# Patient Record
Sex: FEMALE | Race: BLACK OR AFRICAN AMERICAN | Employment: FULL TIME | URBAN - METROPOLITAN AREA
[De-identification: names, ages, dates, MRNs, and addresses within clinical notes are randomized per-mention and may not be internally consistent; named-entity substitution may affect disease eponyms.]

---

## 2017-02-17 ENCOUNTER — OFFICE VISIT (OUTPATIENT)
Dept: INTERNAL MEDICINE CLINIC | Facility: CLINIC | Age: 52
End: 2017-02-17

## 2017-02-17 VITALS
WEIGHT: 293 LBS | HEIGHT: 67 IN | HEART RATE: 64 BPM | BODY MASS INDEX: 45.99 KG/M2 | DIASTOLIC BLOOD PRESSURE: 72 MMHG | TEMPERATURE: 98 F | SYSTOLIC BLOOD PRESSURE: 128 MMHG | OXYGEN SATURATION: 97 %

## 2017-02-17 DIAGNOSIS — E78.5 HYPERLIPIDEMIA, UNSPECIFIED HYPERLIPIDEMIA TYPE: ICD-10-CM

## 2017-02-17 DIAGNOSIS — Z00.00 ANNUAL PHYSICAL EXAM: Primary | ICD-10-CM

## 2017-02-17 DIAGNOSIS — R73.03 PREDIABETES: ICD-10-CM

## 2017-02-17 DIAGNOSIS — E55.9 VITAMIN D DEFICIENCY: ICD-10-CM

## 2017-02-17 DIAGNOSIS — N95.1 PERIMENOPAUSAL: ICD-10-CM

## 2017-02-17 DIAGNOSIS — E66.01 MORBID OBESITY, UNSPECIFIED OBESITY TYPE (HCC): ICD-10-CM

## 2017-02-17 DIAGNOSIS — I10 ESSENTIAL HYPERTENSION: ICD-10-CM

## 2017-02-17 PROCEDURE — 99386 PREV VISIT NEW AGE 40-64: CPT | Performed by: INTERNAL MEDICINE

## 2017-02-17 RX ORDER — VALACYCLOVIR HYDROCHLORIDE 1 G/1
1 TABLET, FILM COATED ORAL DAILY
Qty: 30 TABLET | Refills: 5 | Status: SHIPPED | OUTPATIENT
Start: 2017-02-17 | End: 2017-03-19

## 2017-02-17 NOTE — PATIENT INSTRUCTIONS
Facts about weight loss:  -Men lose more weight than women of similar height and weight when they comply with eating any given diet because men have more lean body mass, less percent body fat, and therefore higher energy expenditure.  -Older subjects of Constipation means that you have bowel movements that are less frequent than usual. Stools often become very hard and difficult to pass. Constipation is very common. At some point in life it affects almost everyone.  Since everyone's bowel habits are diffe All treatment should be done after talking with your healthcare provider. This is especially true if you have another medical problems, are taking prescription medicines, or are an older adult. Treatment most often involves lifestyle changes.  You may also · Pain in your abdomen or back gets worse  · Nausea or vomiting  · Swelling in your abdomen  · Blood in the stool  · Black, tarry stool  · Involuntary weight loss  · Weakness  Date Last Reviewed: 12/30/2015  © 7397-5729 The [place] · Cut back on saturated fats and trans (also called hydrogenated) fats by selecting lean cuts of meat, low-fat dairy, and using oils instead of solid fats. Limit baked goods, processed meats, and fried foods.  A diet that’s high in these fats increases your © 7026-9332 The [address] [place] [address], [address] [place]. All rights reserved. This information is not intended as a substitute for professional medical care. Always follow your healthcare professional's instructions.         Raffi Loyola Your healthcare provider may recommend that you get more physical activity if you haven't been active. Your provider may recommend that you get moderate to vigorous physical activity for at least 40 minutes each day.  You should do this for at least 3 to 4 Healthy eating and exercise are a good start to keeping your cholesterol down. But you may need some extra help from medicine. If your doctor prescribes medicine, be sure to take it exactly as directed.  Remember:  · Tell your healthcare provider about all If you are in a high-risk group, talk with your healthcare provider about your treatment goals. Make sure you understand why these goals are important, based on your own health history and your family history of heart disease or high cholesterol.   Make a p

## 2017-02-18 NOTE — PROGRESS NOTES
Joseph Garcia is a 46year old female.     Chief complaint:  Annual physical exam   HPI:         46year old female with PMH as listed below here for annual physical exam   Patient with hx of HTN on triamterene  No headache no chest pain no sob     Hyperlipi edema  NEURO: no gross deficits             ValACYclovir HCl 1 G Oral Tab  No orders of the defined types were placed in this encounter. ASSESSMENT AND PLAN:     1. Essential hypertension  Continue triamterene  cmp        2.  Hyperlipidemia, unspecifi

## 2017-02-20 ENCOUNTER — NURSE ONLY (OUTPATIENT)
Dept: INTERNAL MEDICINE CLINIC | Facility: CLINIC | Age: 52
End: 2017-02-20

## 2017-02-20 DIAGNOSIS — E55.9 VITAMIN D DEFICIENCY: ICD-10-CM

## 2017-02-20 DIAGNOSIS — I10 ESSENTIAL HYPERTENSION: ICD-10-CM

## 2017-02-20 DIAGNOSIS — N95.1 PERIMENOPAUSAL: ICD-10-CM

## 2017-02-20 DIAGNOSIS — R73.03 PREDIABETES: ICD-10-CM

## 2017-02-20 DIAGNOSIS — E66.01 MORBID OBESITY, UNSPECIFIED OBESITY TYPE (HCC): ICD-10-CM

## 2017-02-20 DIAGNOSIS — E78.5 HYPERLIPIDEMIA, UNSPECIFIED HYPERLIPIDEMIA TYPE: ICD-10-CM

## 2017-02-20 DIAGNOSIS — Z00.00 ANNUAL PHYSICAL EXAM: ICD-10-CM

## 2017-02-20 LAB
ALBUMIN SERPL BCP-MCNC: 3.6 G/DL (ref 3.5–4.8)
ALBUMIN/GLOB SERPL: 1 {RATIO} (ref 1–2)
ALP SERPL-CCNC: 83 U/L (ref 32–100)
ALT SERPL-CCNC: 9 U/L (ref 14–54)
ANION GAP SERPL CALC-SCNC: 8 MMOL/L (ref 0–18)
AST SERPL-CCNC: 16 U/L (ref 15–41)
BASOPHILS # BLD: 0.1 K/UL (ref 0–0.2)
BASOPHILS NFR BLD: 1 %
BILIRUB SERPL-MCNC: 0.4 MG/DL (ref 0.3–1.2)
BUN SERPL-MCNC: 14 MG/DL (ref 8–20)
BUN/CREAT SERPL: 16.3 (ref 10–20)
CALCIUM SERPL-MCNC: 9.2 MG/DL (ref 8.5–10.5)
CHLORIDE SERPL-SCNC: 103 MMOL/L (ref 95–110)
CHOLEST SERPL-MCNC: 232 MG/DL (ref 110–200)
CO2 SERPL-SCNC: 30 MMOL/L (ref 22–32)
CREAT SERPL-MCNC: 0.86 MG/DL (ref 0.5–1.5)
EOSINOPHIL # BLD: 0.2 K/UL (ref 0–0.7)
EOSINOPHIL NFR BLD: 4 %
ERYTHROCYTE [DISTWIDTH] IN BLOOD BY AUTOMATED COUNT: 13.1 % (ref 11–15)
FSH SERPL-ACNC: 9.3 MIU/ML
GLOBULIN PLAS-MCNC: 3.6 G/DL (ref 2.5–3.7)
GLUCOSE SERPL-MCNC: 99 MG/DL (ref 70–99)
HBA1C MFR BLD: 5.6 % (ref 4–6)
HCT VFR BLD AUTO: 36.3 % (ref 35–48)
HDLC SERPL-MCNC: 44 MG/DL
HGB BLD-MCNC: 12.1 G/DL (ref 12–16)
LDLC SERPL CALC-MCNC: 169 MG/DL (ref 0–99)
LYMPHOCYTES # BLD: 1.3 K/UL (ref 1–4)
LYMPHOCYTES NFR BLD: 24 %
MCH RBC QN AUTO: 30.8 PG (ref 27–32)
MCHC RBC AUTO-ENTMCNC: 33.3 G/DL (ref 32–37)
MCV RBC AUTO: 92.6 FL (ref 80–100)
MONOCYTES # BLD: 0.6 K/UL (ref 0–1)
MONOCYTES NFR BLD: 11 %
NEUTROPHILS # BLD AUTO: 3.2 K/UL (ref 1.8–7.7)
NEUTROPHILS NFR BLD: 60 %
NONHDLC SERPL-MCNC: 188 MG/DL
OSMOLALITY UR CALC.SUM OF ELEC: 293 MOSM/KG (ref 275–295)
PLATELET # BLD AUTO: 318 K/UL (ref 140–400)
PMV BLD AUTO: 9 FL (ref 7.4–10.3)
POTASSIUM SERPL-SCNC: 3.8 MMOL/L (ref 3.3–5.1)
PROT SERPL-MCNC: 7.2 G/DL (ref 5.9–8.4)
RBC # BLD AUTO: 3.92 M/UL (ref 3.7–5.4)
SODIUM SERPL-SCNC: 141 MMOL/L (ref 136–144)
TRIGL SERPL-MCNC: 97 MG/DL (ref 1–149)
TSH SERPL-ACNC: 1.18 UIU/ML (ref 0.34–5.6)
VIT B12 SERPL-MCNC: 412 PG/ML (ref 181–914)
WBC # BLD AUTO: 5.4 K/UL (ref 4–11)

## 2017-02-20 PROCEDURE — 82607 VITAMIN B-12: CPT | Performed by: INTERNAL MEDICINE

## 2017-02-20 PROCEDURE — 80061 LIPID PANEL: CPT | Performed by: INTERNAL MEDICINE

## 2017-02-20 PROCEDURE — 84443 ASSAY THYROID STIM HORMONE: CPT | Performed by: INTERNAL MEDICINE

## 2017-02-20 PROCEDURE — 82306 VITAMIN D 25 HYDROXY: CPT | Performed by: INTERNAL MEDICINE

## 2017-02-20 PROCEDURE — 83001 ASSAY OF GONADOTROPIN (FSH): CPT | Performed by: INTERNAL MEDICINE

## 2017-02-20 PROCEDURE — 85025 COMPLETE CBC W/AUTO DIFF WBC: CPT | Performed by: INTERNAL MEDICINE

## 2017-02-20 PROCEDURE — 80053 COMPREHEN METABOLIC PANEL: CPT | Performed by: INTERNAL MEDICINE

## 2017-02-20 PROCEDURE — 36415 COLL VENOUS BLD VENIPUNCTURE: CPT | Performed by: INTERNAL MEDICINE

## 2017-02-20 PROCEDURE — 83036 HEMOGLOBIN GLYCOSYLATED A1C: CPT | Performed by: INTERNAL MEDICINE

## 2017-02-20 PROCEDURE — 80050 GENERAL HEALTH PANEL: CPT | Performed by: INTERNAL MEDICINE

## 2017-02-20 NOTE — PROGRESS NOTES
CBC,CMP,LIPID,TSH,HEMOGLOBIN A1C,VITAMIN D, VITAMIN B12, and [address] [place] [address] labs drawn per Dr Analia Gómez orders.  Patient tolerated lab draw well

## 2017-02-22 LAB — 25(OH)D3 SERPL-MCNC: 25.5 NG/ML

## 2017-03-07 RX ORDER — TRIAMTERENE AND HYDROCHLOROTHIAZIDE 37.5; 25 MG/1; MG/1
TABLET ORAL
Qty: 30 TABLET | Refills: 3 | Status: SHIPPED | OUTPATIENT
Start: 2017-03-07 | End: 2017-06-09

## 2017-03-07 RX ORDER — TRIAMTERENE AND HYDROCHLOROTHIAZIDE 37.5; 25 MG/1; MG/1
TABLET ORAL
Refills: 1 | COMMUNITY
Start: 2017-01-31 | End: 2017-03-07

## 2017-03-14 ENCOUNTER — MED REC SCAN ONLY (OUTPATIENT)
Dept: INTERNAL MEDICINE CLINIC | Facility: CLINIC | Age: 52
End: 2017-03-14

## 2017-04-20 RX ORDER — DICLOFENAC SODIUM 75 MG/1
75 TABLET, DELAYED RELEASE ORAL 2 TIMES DAILY
Qty: 30 TABLET | Refills: 2 | Status: SHIPPED | OUTPATIENT
Start: 2017-04-20 | End: 2017-09-08 | Stop reason: ALTCHOICE

## 2017-04-20 RX ORDER — VALACYCLOVIR HYDROCHLORIDE 500 MG/1
TABLET, FILM COATED ORAL
Refills: 0 | COMMUNITY
Start: 2017-01-31 | End: 2017-11-14

## 2017-04-20 NOTE — TELEPHONE ENCOUNTER
Pt's  verified.  Pt requesting a refill on diclofenac 75 mg - states she forgot to inform MD and MA who roomed her that she takes this med- also uses an eczema cream will call back with name of it for her list to be updated- confirmed pharmacy informed h

## 2017-04-21 ENCOUNTER — TELEPHONE (OUTPATIENT)
Dept: INTERNAL MEDICINE CLINIC | Facility: CLINIC | Age: 52
End: 2017-04-21

## 2017-06-09 RX ORDER — TRIAMTERENE AND HYDROCHLOROTHIAZIDE 37.5; 25 MG/1; MG/1
TABLET ORAL
Qty: 30 TABLET | Refills: 2 | Status: SHIPPED | OUTPATIENT
Start: 2017-06-09 | End: 2017-09-17

## 2017-07-28 ENCOUNTER — TELEPHONE (OUTPATIENT)
Dept: INTERNAL MEDICINE CLINIC | Facility: CLINIC | Age: 52
End: 2017-07-28

## 2017-09-08 ENCOUNTER — OFFICE VISIT (OUTPATIENT)
Dept: INTERNAL MEDICINE CLINIC | Facility: CLINIC | Age: 52
End: 2017-09-08

## 2017-09-08 VITALS
HEART RATE: 63 BPM | OXYGEN SATURATION: 99 % | TEMPERATURE: 98 F | WEIGHT: 293 LBS | HEIGHT: 67 IN | BODY MASS INDEX: 45.99 KG/M2

## 2017-09-08 DIAGNOSIS — Z00.00 ROUTINE CHECK-UP: ICD-10-CM

## 2017-09-08 DIAGNOSIS — N95.1 PERI-MENOPAUSAL: ICD-10-CM

## 2017-09-08 DIAGNOSIS — I10 ESSENTIAL HYPERTENSION: ICD-10-CM

## 2017-09-08 DIAGNOSIS — Z12.39 SCREENING FOR BREAST CANCER: ICD-10-CM

## 2017-09-08 DIAGNOSIS — E78.5 HYPERLIPIDEMIA, UNSPECIFIED HYPERLIPIDEMIA TYPE: ICD-10-CM

## 2017-09-08 DIAGNOSIS — J32.9 SINUSITIS, UNSPECIFIED CHRONICITY, UNSPECIFIED LOCATION: ICD-10-CM

## 2017-09-08 DIAGNOSIS — Z00.00 ROUTINE HEALTH MAINTENANCE: ICD-10-CM

## 2017-09-08 DIAGNOSIS — R10.9 ABDOMINAL PAIN, UNSPECIFIED ABDOMINAL LOCATION: ICD-10-CM

## 2017-09-08 DIAGNOSIS — K21.9 GASTROESOPHAGEAL REFLUX DISEASE, ESOPHAGITIS PRESENCE NOT SPECIFIED: ICD-10-CM

## 2017-09-08 DIAGNOSIS — Z12.72 ENCOUNTER FOR SCREENING FOR MALIGNANT NEOPLASM OF VAGINA: Primary | ICD-10-CM

## 2017-09-08 DIAGNOSIS — Z12.4 SCREENING FOR CERVICAL CANCER: ICD-10-CM

## 2017-09-08 LAB
ANION GAP SERPL CALC-SCNC: 10 MMOL/L (ref 0–18)
BUN SERPL-MCNC: 12 MG/DL (ref 8–20)
BUN/CREAT SERPL: 13.8 (ref 10–20)
CALCIUM SERPL-MCNC: 9.4 MG/DL (ref 8.5–10.5)
CHLORIDE SERPL-SCNC: 101 MMOL/L (ref 95–110)
CHOLEST SERPL-MCNC: 293 MG/DL (ref 110–200)
CO2 SERPL-SCNC: 27 MMOL/L (ref 22–32)
CREAT SERPL-MCNC: 0.87 MG/DL (ref 0.5–1.5)
FSH SERPL-ACNC: 9.5 MIU/ML
GLUCOSE SERPL-MCNC: 91 MG/DL (ref 70–99)
HDLC SERPL-MCNC: 58 MG/DL
LDLC SERPL CALC-MCNC: 218 MG/DL (ref 0–99)
NONHDLC SERPL-MCNC: 235 MG/DL
OSMOLALITY UR CALC.SUM OF ELEC: 285 MOSM/KG (ref 275–295)
POTASSIUM SERPL-SCNC: 3.7 MMOL/L (ref 3.3–5.1)
SODIUM SERPL-SCNC: 138 MMOL/L (ref 136–144)
TRIGL SERPL-MCNC: 86 MG/DL (ref 1–149)

## 2017-09-08 PROCEDURE — 83001 ASSAY OF GONADOTROPIN (FSH): CPT | Performed by: INTERNAL MEDICINE

## 2017-09-08 PROCEDURE — 80061 LIPID PANEL: CPT | Performed by: INTERNAL MEDICINE

## 2017-09-08 PROCEDURE — 82679 ASSAY OF ESTRONE: CPT | Performed by: INTERNAL MEDICINE

## 2017-09-08 PROCEDURE — 99214 OFFICE O/P EST MOD 30 MIN: CPT | Performed by: INTERNAL MEDICINE

## 2017-09-08 PROCEDURE — 80048 BASIC METABOLIC PNL TOTAL CA: CPT | Performed by: INTERNAL MEDICINE

## 2017-09-08 RX ORDER — AZITHROMYCIN 250 MG/1
TABLET, FILM COATED ORAL
Qty: 6 TABLET | Refills: 0 | Status: SHIPPED | OUTPATIENT
Start: 2017-09-08 | End: 2017-11-14

## 2017-09-08 RX ORDER — FEXOFENADINE HCL AND PSEUDOEPHEDRINE HCI 180; 240 MG/1; MG/1
1 TABLET, EXTENDED RELEASE ORAL DAILY
Qty: 7 TABLET | Refills: 0 | Status: SHIPPED | OUTPATIENT
Start: 2017-09-08 | End: 2017-09-15

## 2017-09-08 RX ORDER — FAMOTIDINE 20 MG/1
20 TABLET ORAL 2 TIMES DAILY
Qty: 30 TABLET | Refills: 3 | Status: SHIPPED | OUTPATIENT
Start: 2017-09-08 | End: 2017-11-14

## 2017-09-08 NOTE — PROGRESS NOTES
Sirena Tellez is a 46year old female.     Chief complaint:  Pap smear, hyperlipidemia, perimenopausal   HPI:     46year old female with   PMH as listed   Here for pap smear   Last pap smear 2.5 years ago   No history HPV   Not sexually active   history of adenopathy  LUNGS: clear to auscultation  CARDIO: RRR without murmur  GI: good BS's,no masses, HSM or tenderness  EXTREMITIES: no cyanosis, clubbing or edema  Pelvic exam :no vaginal discharge   Pap done   NEURO: no gross deficits              No orders of

## 2017-09-11 LAB — ESTRONE BY TMS: 58.6 PG/ML

## 2017-09-15 ENCOUNTER — TELEPHONE (OUTPATIENT)
Dept: INTERNAL MEDICINE CLINIC | Facility: CLINIC | Age: 52
End: 2017-09-15

## 2017-09-15 RX ORDER — PRAVASTATIN SODIUM 40 MG
40 TABLET ORAL NIGHTLY
Qty: 90 TABLET | Refills: 1 | Status: SHIPPED | OUTPATIENT
Start: 2017-09-15 | End: 2017-12-14

## 2017-09-18 RX ORDER — TRIAMTERENE AND HYDROCHLOROTHIAZIDE 37.5; 25 MG/1; MG/1
TABLET ORAL
Qty: 30 TABLET | Refills: 2 | Status: SHIPPED | OUTPATIENT
Start: 2017-09-18 | End: 2017-12-28

## 2017-09-19 ENCOUNTER — MED REC SCAN ONLY (OUTPATIENT)
Dept: INTERNAL MEDICINE CLINIC | Facility: CLINIC | Age: 52
End: 2017-09-19

## 2017-10-10 RX ORDER — VALACYCLOVIR HYDROCHLORIDE 1 G/1
TABLET, FILM COATED ORAL
Qty: 30 TABLET | Refills: 4 | Status: SHIPPED | OUTPATIENT
Start: 2017-10-10 | End: 2018-06-15

## 2017-10-31 ENCOUNTER — TELEPHONE (OUTPATIENT)
Dept: INTERNAL MEDICINE CLINIC | Facility: CLINIC | Age: 52
End: 2017-10-31

## 2017-10-31 DIAGNOSIS — R10.9 ABDOMINAL PAIN, UNSPECIFIED ABDOMINAL LOCATION: Primary | ICD-10-CM

## 2017-10-31 NOTE — TELEPHONE ENCOUNTER
Resubmitted the [address] referral not sure if patients insurance requires approval, she is scheduled for 11/3/2017 at [address] [facility] with the patient as too her concern for the [address].  I can only confirm that order is correct per Dr Franklin Cardenas notes if additiona

## 2017-11-03 ENCOUNTER — HOSPITAL ENCOUNTER (OUTPATIENT)
Dept: ULTRASOUND IMAGING | Age: 52
Discharge: HOME | End: 2017-11-03
Attending: INTERNAL MEDICINE
Payer: COMMERCIAL

## 2017-11-03 DIAGNOSIS — R10.9 ABDOMINAL PAIN, UNSPECIFIED ABDOMINAL LOCATION: ICD-10-CM

## 2017-11-03 PROCEDURE — 76700 US EXAM ABDOM COMPLETE: CPT | Performed by: INTERNAL MEDICINE

## 2017-11-04 ENCOUNTER — MED REC SCAN ONLY (OUTPATIENT)
Dept: INTERNAL MEDICINE CLINIC | Facility: CLINIC | Age: 52
End: 2017-11-04

## 2017-11-14 ENCOUNTER — TELEPHONE (OUTPATIENT)
Dept: GASTROENTEROLOGY | Facility: CLINIC | Age: 52
End: 2017-11-14

## 2017-11-14 ENCOUNTER — OFFICE VISIT (OUTPATIENT)
Dept: GASTROENTEROLOGY | Facility: CLINIC | Age: 52
End: 2017-11-14

## 2017-11-14 ENCOUNTER — HOSPITAL ENCOUNTER (OUTPATIENT)
Dept: GENERAL RADIOLOGY | Facility: HOSPITAL | Age: 52
Discharge: HOME | End: 2017-11-14
Attending: INTERNAL MEDICINE
Payer: COMMERCIAL

## 2017-11-14 VITALS
HEART RATE: 54 BPM | WEIGHT: 293 LBS | BODY MASS INDEX: 45.99 KG/M2 | DIASTOLIC BLOOD PRESSURE: 84 MMHG | HEIGHT: 66.75 IN | SYSTOLIC BLOOD PRESSURE: 136 MMHG

## 2017-11-14 DIAGNOSIS — K59.00 CONSTIPATION: ICD-10-CM

## 2017-11-14 DIAGNOSIS — R10.10 PAIN OF UPPER ABDOMEN: ICD-10-CM

## 2017-11-14 DIAGNOSIS — R10.10 INTERMITTENT UPPER ABDOMINAL PAIN: ICD-10-CM

## 2017-11-14 DIAGNOSIS — R14.0 BLOATING: ICD-10-CM

## 2017-11-14 DIAGNOSIS — K59.00 CONSTIPATION, UNSPECIFIED CONSTIPATION TYPE: ICD-10-CM

## 2017-11-14 DIAGNOSIS — R14.0 BLOATING: Primary | ICD-10-CM

## 2017-11-14 PROCEDURE — 99212 OFFICE O/P EST SF 10 MIN: CPT | Performed by: INTERNAL MEDICINE

## 2017-11-14 PROCEDURE — 99244 OFF/OP CNSLTJ NEW/EST MOD 40: CPT | Performed by: INTERNAL MEDICINE

## 2017-11-14 PROCEDURE — 74000 XR ABDOMEN (1 VIEW) (CPT=74000): CPT | Performed by: INTERNAL MEDICINE

## 2017-11-14 RX ORDER — BETAMETHASONE DIPROPIONATE 0.05 %
OINTMENT (GRAM) TOPICAL 2 TIMES DAILY
COMMUNITY
End: 2018-07-03

## 2017-11-14 RX ORDER — DICLOFENAC SODIUM 75 MG/1
75 TABLET, DELAYED RELEASE ORAL 2 TIMES DAILY
COMMUNITY
End: 2018-07-03 | Stop reason: ALTCHOICE

## 2017-11-14 RX ORDER — PEG-3350, SODIUM SULFATE, SODIUM CHLORIDE, POTASSIUM CHLORIDE, SODIUM ASCORBATE AND ASCORBIC ACID 7.5-2.691G
KIT ORAL
Qty: 1 EACH | Refills: 0 | Status: SHIPPED | OUTPATIENT
Start: 2017-11-14 | End: 2018-07-03 | Stop reason: ALTCHOICE

## 2017-11-14 RX ORDER — ESOMEPRAZOLE MAGNESIUM 40 MG/1
40 CAPSULE, DELAYED RELEASE ORAL
Qty: 60 CAPSULE | Refills: 0 | Status: SHIPPED | OUTPATIENT
Start: 2017-11-14 | End: 2018-11-19 | Stop reason: ALTCHOICE

## 2017-11-14 NOTE — H&P
[facility], [facility] - Gastroenterology                                                                                                  Clinic History and Physical BY MOUTH DAILY. Disp: 30 tablet Rfl: 4   TRIAMTERENE-HCTZ 37.5-25 MG Oral Tab TAKE 1 TABLET BY MOUTH EVERY DAY IN THE MORNING Disp: 30 tablet Rfl: 2   Pravastatin Sodium 40 MG Oral Tab Take 1 tablet (40 mg total) by mouth nightly.  Disp: 90 tablet Rfl: 1 currently due to cost  -KUB and possible colon washout/bowel prep followed by miralax  -PPI daily for 8 weeks  -follow up in 2 months and if no improvement try FDGard or iberogast (as she prefers to avoid prescription medication) and low FODMAP diet  -obta

## 2017-11-14 NOTE — TELEPHONE ENCOUNTER
GI Staff:    Please call the patient that there was a small to moderate stool burden on X ray. Recommend washout as below (which I also wrote on her office visit instructions). WASHOUT INSTRUCTIONS:  1. Pick a day you will be at home, close to a toilet.

## 2017-11-14 NOTE — TELEPHONE ENCOUNTER
Noted and agreed.  Thank you    Pool Bradshaw MD  [facility] - Gastroenterology  11/14/2017  5:41 PM

## 2017-11-14 NOTE — TELEPHONE ENCOUNTER
Pharmacy called w/ question about RX for prep. Please call.         Current Outpatient Prescriptions:   •  PEG-KCl-NaCl-NaSulf-Na Asc-C (MOVIPREP) 100 g Oral Recon Soln, As directed., Disp: 1 each, Rfl: 0

## 2017-11-14 NOTE — TELEPHONE ENCOUNTER
Contacted [facility] pharmacy and spoke w/ Wale Emery who states their system has been down since 9 am this morning and has affected all [facility]' [place]. She wants to clarify what prep was sent in for the pt.  I reviewed that she needs moviprep for a colon washo

## 2017-11-14 NOTE — PATIENT INSTRUCTIONS
1. Gastroesophageal reflux disease (GERD) recommendations    -raise the head of the bed  -avoid tight fitting clothing or belts  -avoid eating late at night and lying down shortly after mealtime  -work on loosing weight  -avoid aspirin, NSAIDs (i.e. Ibupro

## 2017-12-28 RX ORDER — TRIAMTERENE AND HYDROCHLOROTHIAZIDE 37.5; 25 MG/1; MG/1
TABLET ORAL
Qty: 30 TABLET | Refills: 5 | Status: SHIPPED | OUTPATIENT
Start: 2017-12-28 | End: 2018-11-19

## 2017-12-28 NOTE — TELEPHONE ENCOUNTER
Requesting: jason 37.5-25  LOV: 9/8/17  RTC: ?  Last Relevant Labs: 9/8/17  Filled: 9/18/17 #30 with 2 refills    Pended if OK.      Future Appointments  Date Time Provider Cecile Nava   1/16/2018 8:00 AM Paulette Webster MD [facility]

## 2018-01-24 ENCOUNTER — TELEPHONE (OUTPATIENT)
Dept: INTERNAL MEDICINE CLINIC | Facility: CLINIC | Age: 53
End: 2018-01-24

## 2018-01-25 ENCOUNTER — TELEPHONE (OUTPATIENT)
Dept: INTERNAL MEDICINE CLINIC | Facility: CLINIC | Age: 53
End: 2018-01-25

## 2018-01-25 NOTE — TELEPHONE ENCOUNTER
Patient called at stated that the wrong billing code was entered for her DOS 9/8/17 and insurance company will not pay for this until it is changed. Patient states that she spoke with the billing department and they informed her to call and have DR. Cleayr

## 2018-03-29 ENCOUNTER — TELEPHONE (OUTPATIENT)
Dept: INTERNAL MEDICINE CLINIC | Facility: CLINIC | Age: 53
End: 2018-03-29

## 2018-03-30 ENCOUNTER — TELEPHONE (OUTPATIENT)
Dept: INTERNAL MEDICINE CLINIC | Facility: CLINIC | Age: 53
End: 2018-03-30

## 2018-03-30 NOTE — TELEPHONE ENCOUNTER
Pt called request zpack rx for sinus infection. c/o sinus pressure, mucus, dizziness for 3 weeks  Has tried otc benadryl, sudafed.  Offer an appt, cannot come in, please advise  [facility] [address]

## 2018-04-02 ENCOUNTER — TELEPHONE (OUTPATIENT)
Dept: INTERNAL MEDICINE CLINIC | Facility: CLINIC | Age: 53
End: 2018-04-02

## 2018-04-02 RX ORDER — AZITHROMYCIN 250 MG/1
TABLET, FILM COATED ORAL
Qty: 6 TABLET | Refills: 0 | Status: SHIPPED | OUTPATIENT
Start: 2018-04-02 | End: 2018-07-03 | Stop reason: ALTCHOICE

## 2018-06-15 RX ORDER — VALACYCLOVIR HYDROCHLORIDE 1 G/1
TABLET, FILM COATED ORAL
Qty: 30 TABLET | Refills: 4 | Status: SHIPPED | OUTPATIENT
Start: 2018-06-15 | End: 2018-12-14

## 2018-07-03 ENCOUNTER — OFFICE VISIT (OUTPATIENT)
Dept: INTERNAL MEDICINE CLINIC | Facility: CLINIC | Age: 53
End: 2018-07-03

## 2018-07-03 VITALS
OXYGEN SATURATION: 98 % | HEIGHT: 66.75 IN | SYSTOLIC BLOOD PRESSURE: 138 MMHG | DIASTOLIC BLOOD PRESSURE: 88 MMHG | HEART RATE: 73 BPM | WEIGHT: 289.81 LBS | BODY MASS INDEX: 45.49 KG/M2 | TEMPERATURE: 98 F

## 2018-07-03 DIAGNOSIS — R05.9 COUGH: ICD-10-CM

## 2018-07-03 DIAGNOSIS — K82.4 GALLBLADDER POLYP: ICD-10-CM

## 2018-07-03 DIAGNOSIS — Z12.11 SCREENING FOR COLON CANCER: ICD-10-CM

## 2018-07-03 DIAGNOSIS — Z00.00 ANNUAL PHYSICAL EXAM: Primary | ICD-10-CM

## 2018-07-03 DIAGNOSIS — K21.9 GASTROESOPHAGEAL REFLUX DISEASE, ESOPHAGITIS PRESENCE NOT SPECIFIED: ICD-10-CM

## 2018-07-03 DIAGNOSIS — I10 ESSENTIAL HYPERTENSION: ICD-10-CM

## 2018-07-03 LAB
ALBUMIN SERPL BCP-MCNC: 4.2 G/DL (ref 3.5–4.8)
ALBUMIN/GLOB SERPL: 1.2 {RATIO} (ref 1–2)
ALP SERPL-CCNC: 87 U/L (ref 32–100)
ALT SERPL-CCNC: 17 U/L (ref 14–54)
ANION GAP SERPL CALC-SCNC: 11 MMOL/L (ref 0–18)
AST SERPL-CCNC: 19 U/L (ref 15–41)
BASOPHILS # BLD: 0 K/UL (ref 0–0.2)
BASOPHILS NFR BLD: 1 %
BILIRUB SERPL-MCNC: 0.4 MG/DL (ref 0.3–1.2)
BUN SERPL-MCNC: 11 MG/DL (ref 8–20)
BUN/CREAT SERPL: 12.8 (ref 10–20)
CALCIUM SERPL-MCNC: 9.6 MG/DL (ref 8.5–10.5)
CHLORIDE SERPL-SCNC: 98 MMOL/L (ref 95–110)
CHOLEST SERPL-MCNC: 229 MG/DL (ref 110–200)
CO2 SERPL-SCNC: 28 MMOL/L (ref 22–32)
CREAT SERPL-MCNC: 0.86 MG/DL (ref 0.5–1.5)
EOSINOPHIL # BLD: 0.6 K/UL (ref 0–0.7)
EOSINOPHIL NFR BLD: 11 %
ERYTHROCYTE [DISTWIDTH] IN BLOOD BY AUTOMATED COUNT: 14.3 % (ref 11–15)
GLOBULIN PLAS-MCNC: 3.6 G/DL (ref 2.5–3.7)
GLUCOSE SERPL-MCNC: 98 MG/DL (ref 70–99)
HCT VFR BLD AUTO: 41.1 % (ref 35–48)
HDLC SERPL-MCNC: 66 MG/DL
HGB BLD-MCNC: 13.7 G/DL (ref 12–16)
LDLC SERPL CALC-MCNC: 145 MG/DL (ref 0–99)
LYMPHOCYTES # BLD: 2.1 K/UL (ref 1–4)
LYMPHOCYTES NFR BLD: 37 %
MCH RBC QN AUTO: 30.7 PG (ref 27–32)
MCHC RBC AUTO-ENTMCNC: 33.3 G/DL (ref 32–37)
MCV RBC AUTO: 92.1 FL (ref 80–100)
MONOCYTES # BLD: 0.5 K/UL (ref 0–1)
MONOCYTES NFR BLD: 9 %
NEUTROPHILS # BLD AUTO: 2.5 K/UL (ref 1.8–7.7)
NEUTROPHILS NFR BLD: 43 %
NONHDLC SERPL-MCNC: 163 MG/DL
OSMOLALITY UR CALC.SUM OF ELEC: 283 MOSM/KG (ref 275–295)
PATIENT FASTING: YES
PLATELET # BLD AUTO: 343 K/UL (ref 140–400)
PMV BLD AUTO: 9.4 FL (ref 7.4–10.3)
POTASSIUM SERPL-SCNC: 3.9 MMOL/L (ref 3.3–5.1)
PROT SERPL-MCNC: 7.8 G/DL (ref 5.9–8.4)
RBC # BLD AUTO: 4.46 M/UL (ref 3.7–5.4)
SODIUM SERPL-SCNC: 137 MMOL/L (ref 136–144)
TRIGL SERPL-MCNC: 91 MG/DL (ref 1–149)
TSH SERPL-ACNC: 1.09 UIU/ML (ref 0.45–5.33)
WBC # BLD AUTO: 5.8 K/UL (ref 4–11)

## 2018-07-03 PROCEDURE — 80061 LIPID PANEL: CPT | Performed by: INTERNAL MEDICINE

## 2018-07-03 PROCEDURE — 85025 COMPLETE CBC W/AUTO DIFF WBC: CPT | Performed by: INTERNAL MEDICINE

## 2018-07-03 PROCEDURE — 80053 COMPREHEN METABOLIC PANEL: CPT | Performed by: INTERNAL MEDICINE

## 2018-07-03 PROCEDURE — 84443 ASSAY THYROID STIM HORMONE: CPT | Performed by: INTERNAL MEDICINE

## 2018-07-03 PROCEDURE — 99396 PREV VISIT EST AGE 40-64: CPT | Performed by: INTERNAL MEDICINE

## 2018-07-03 NOTE — PROGRESS NOTES
Angelica Skelton is a 48year old female.     Chief complaint:  Annual physical exam   HPI:      year old female with PMH as listed below here for   Annual physical exam  Patient reports last week 6 days ago had acute onset of SOB   wheezing  Went to urgent c deficiency     Prediabetes     Bloating     Constipation      REVIEW OF SYSTEMS:   A comprehensive 10 point review of systems was completed.   Pertinent positives and negatives noted in the the HPI            EXAM:   /88   Pulse 73   Temp 98 °F (36.7 records     Please return to the clinic if you are having persistent or worsening symptoms   Mita Mortensen MD,   Diplomate of the [employer] of Internal Medicine  Diplomate of the American Board of Obesity Medicine

## 2018-07-04 PROBLEM — K82.4 GALLBLADDER POLYP: Status: ACTIVE | Noted: 2018-07-04

## 2018-07-04 PROBLEM — K21.9 GASTROESOPHAGEAL REFLUX DISEASE: Status: ACTIVE | Noted: 2018-07-04

## 2018-07-05 ENCOUNTER — TELEPHONE (OUTPATIENT)
Dept: INTERNAL MEDICINE CLINIC | Facility: CLINIC | Age: 53
End: 2018-07-05

## 2018-07-05 NOTE — TELEPHONE ENCOUNTER
Pt named above is calling regarding blood pressure medication and if she should be still taking any. Please call back and patient is aware that the DrBlake Is out of the office until Monday.

## 2018-07-09 DIAGNOSIS — E78.5 HYPERLIPIDEMIA, UNSPECIFIED HYPERLIPIDEMIA TYPE: Primary | ICD-10-CM

## 2018-07-10 ENCOUNTER — TELEPHONE (OUTPATIENT)
Dept: INTERNAL MEDICINE CLINIC | Facility: CLINIC | Age: 53
End: 2018-07-10

## 2018-07-10 RX ORDER — BENZONATATE 200 MG/1
200 CAPSULE ORAL 3 TIMES DAILY PRN
Qty: 30 CAPSULE | Refills: 0 | Status: SHIPPED | OUTPATIENT
Start: 2018-07-10 | End: 2018-11-19 | Stop reason: ALTCHOICE

## 2018-07-10 NOTE — TELEPHONE ENCOUNTER
Pt left a message via answering service for Dr Klever Cage to call her because she is still having trouble with her breathing.  Please call back at [phone]

## 2018-07-10 NOTE — TELEPHONE ENCOUNTER
Called patient back and discussed with her   Has an appt with pulmon logist today   Also sent benzonatate for the patient

## 2018-07-12 ENCOUNTER — APPOINTMENT (OUTPATIENT)
Dept: LAB | Age: 53
End: 2018-07-12
Attending: ALLERGY & IMMUNOLOGY
Payer: COMMERCIAL

## 2018-07-12 ENCOUNTER — NURSE ONLY (OUTPATIENT)
Dept: ALLERGY | Facility: CLINIC | Age: 53
End: 2018-07-12

## 2018-07-12 ENCOUNTER — OFFICE VISIT (OUTPATIENT)
Dept: ALLERGY | Facility: CLINIC | Age: 53
End: 2018-07-12

## 2018-07-12 ENCOUNTER — TELEPHONE (OUTPATIENT)
Dept: INTERNAL MEDICINE CLINIC | Facility: CLINIC | Age: 53
End: 2018-07-12

## 2018-07-12 VITALS
TEMPERATURE: 98 F | DIASTOLIC BLOOD PRESSURE: 82 MMHG | SYSTOLIC BLOOD PRESSURE: 170 MMHG | OXYGEN SATURATION: 94 % | HEART RATE: 84 BPM

## 2018-07-12 DIAGNOSIS — R06.89 DYSPNEA AND RESPIRATORY ABNORMALITIES: ICD-10-CM

## 2018-07-12 DIAGNOSIS — R06.2 WHEEZE: Primary | ICD-10-CM

## 2018-07-12 DIAGNOSIS — J30.89 ENVIRONMENTAL AND SEASONAL ALLERGIES: ICD-10-CM

## 2018-07-12 DIAGNOSIS — R06.00 DYSPNEA AND RESPIRATORY ABNORMALITIES: ICD-10-CM

## 2018-07-12 DIAGNOSIS — R05.9 COUGH: ICD-10-CM

## 2018-07-12 DIAGNOSIS — J30.89 PERENNIAL ALLERGIC RHINITIS: ICD-10-CM

## 2018-07-12 DIAGNOSIS — R06.2 WHEEZE: ICD-10-CM

## 2018-07-12 LAB — D DIMER PPP FEU-MCNC: 3.61 MCG/ML (ref ?–0.53)

## 2018-07-12 PROCEDURE — 85379 FIBRIN DEGRADATION QUANT: CPT

## 2018-07-12 PROCEDURE — 95004 PERQ TESTS W/ALRGNC XTRCS: CPT | Performed by: ALLERGY & IMMUNOLOGY

## 2018-07-12 PROCEDURE — 99212 OFFICE O/P EST SF 10 MIN: CPT | Performed by: ALLERGY & IMMUNOLOGY

## 2018-07-12 PROCEDURE — 95024 IQ TESTS W/ALLERGENIC XTRCS: CPT | Performed by: ALLERGY & IMMUNOLOGY

## 2018-07-12 PROCEDURE — 94060 EVALUATION OF WHEEZING: CPT | Performed by: ALLERGY & IMMUNOLOGY

## 2018-07-12 PROCEDURE — 36415 COLL VENOUS BLD VENIPUNCTURE: CPT

## 2018-07-12 PROCEDURE — 82785 ASSAY OF IGE: CPT

## 2018-07-12 PROCEDURE — 99204 OFFICE O/P NEW MOD 45 MIN: CPT | Performed by: ALLERGY & IMMUNOLOGY

## 2018-07-12 RX ORDER — METHYLPREDNISOLONE 4 MG/1
TABLET ORAL
Refills: 0 | COMMUNITY
Start: 2018-07-03 | End: 2018-07-12

## 2018-07-12 RX ORDER — MOMETASONE 50 UG/1
2 SPRAY, METERED NASAL DAILY
Qty: 1 INHALER | Refills: 0 | Status: SHIPPED | OUTPATIENT
Start: 2018-07-12 | End: 2018-11-19 | Stop reason: ALTCHOICE

## 2018-07-12 NOTE — PATIENT INSTRUCTIONS
1.  Coughing/wheezing/shortness of breath  Differential includes infectious etiology versus reactive airway disease  Previous chest x-ray and TTE at [address] at [facility] was unremarkable per patient report.   Spirometry today shows normal lung

## 2018-07-12 NOTE — PROGRESS NOTES
Angelica Skelton is a 48year old female. HPI:   Patient presents with: Allergies: New pt presenting with unknown allergies. Pt has c/o SOB pt started to end of june. Currently using Breo, Albuterol and Prednisone.      Patient is a 49-year-old female who 2014   • Esophageal reflux 2005   • Herpes    • Hyperlipidemia 1995   • Morbid obesity [facility])       Past Surgical History:  2014: COLONOSCOPY  2012: KNEE SURGERY   Family History   Problem Relation Age of Onset   • Colon Polyps Father    • Diabetes Father morning before breakfast. Disp: 60 capsule Rfl: 0       Allergies:    Codeine                 JITTERY  Nitroglycerin           OTHER (SEE COMMENTS)    Comment:mirgraine/headaches      ROS:     Allergic/Immuno:  See HPI  Cardiovascular:  Negative for irregu abnormalities  Cough  Perennial allergic rhinitis    Patient is a 59-year-old female who in approximately mid  June started developing respiratory symptoms including wet cough, wheezing shortness of breath. No prior history of asthma.   No preceding fevers MD      If medication samples were provided today, they were provided solely for patient education and training related to self administration of these medications. Teaching, instruction and sample was provided to the patient by myself.   Teaching included

## 2018-07-13 ENCOUNTER — TELEPHONE (OUTPATIENT)
Dept: ALLERGY | Facility: CLINIC | Age: 53
End: 2018-07-13

## 2018-07-13 ENCOUNTER — TELEPHONE (OUTPATIENT)
Dept: INTERNAL MEDICINE CLINIC | Facility: CLINIC | Age: 53
End: 2018-07-13

## 2018-07-13 ENCOUNTER — HOSPITAL ENCOUNTER (OUTPATIENT)
Dept: CT IMAGING | Facility: HOSPITAL | Age: 53
Discharge: HOME | End: 2018-07-13
Attending: ALLERGY & IMMUNOLOGY
Payer: COMMERCIAL

## 2018-07-13 DIAGNOSIS — R79.89 ELEVATED D-DIMER: ICD-10-CM

## 2018-07-13 DIAGNOSIS — I51.89 DIASTOLIC DYSFUNCTION: Primary | ICD-10-CM

## 2018-07-13 DIAGNOSIS — R06.02 SOB (SHORTNESS OF BREATH): ICD-10-CM

## 2018-07-13 DIAGNOSIS — R06.00 DYSPNEA ON EXERTION: ICD-10-CM

## 2018-07-13 LAB — CREAT BLD-MCNC: 0.8 MG/DL (ref 0.5–1.5)

## 2018-07-13 PROCEDURE — 82565 ASSAY OF CREATININE: CPT

## 2018-07-13 PROCEDURE — 71275 CT ANGIOGRAPHY CHEST: CPT | Performed by: ALLERGY & IMMUNOLOGY

## 2018-07-13 NOTE — TELEPHONE ENCOUNTER
Pt called in requesting to speak with Dr. Marie Solomon in regards to other reasons that a D-dymer result would come back elevated.

## 2018-07-13 NOTE — TELEPHONE ENCOUNTER
Please call patient and let her know that is often difficult to isolate the cause of a elevated d-dimer.   Common etiologies may include inflammation, trauma, pregnancy, recent surgery as well as advanced age the good news is no signs of a pulmonary embolis

## 2018-07-14 NOTE — TELEPHONE ENCOUNTER
Left message informing patient that we are in office today until noon, otherwise we will not be back in office until 0900 Monday. Please return our phone call.

## 2018-07-17 LAB — IGE SERPL-ACNC: 32.6 KU/L (ref 2–214)

## 2018-07-17 NOTE — TELEPHONE ENCOUNTER
See 7/12/2018 Allergy Test results encounter for D-Dimer, pt was contacted and informed of results by Dr. Padmini Armendariz.      Notes recorded by Zion Ortega MD on 7/17/2018 at 12:56 PM CDT  Krysten, your total IgE level, allergic antibody level was normal.  Thi

## 2018-07-17 NOTE — TELEPHONE ENCOUNTER
Spoke with pt about receiving medical records from [facility] I all labs and test that was done between 06/29/18 to 7/01/2018. And that the Dr has not been back in the office as of yet to review the test. Well list as high priority.

## 2018-07-17 NOTE — TELEPHONE ENCOUNTER
Discussed with the patient   ECho receieved and reviewed with the patient mild LVH  And diastolic dysfunction   Recommend stress test, controlling risk factors and will call again to get full records from [facility]   Referral to cardiology   Patient verbalized u

## 2018-07-23 ENCOUNTER — TELEPHONE (OUTPATIENT)
Dept: INTERNAL MEDICINE CLINIC | Facility: CLINIC | Age: 53
End: 2018-07-23

## 2018-07-23 DIAGNOSIS — R77.8 ELEVATED TROPONIN: Primary | ICD-10-CM

## 2018-07-23 DIAGNOSIS — I51.89 DIASTOLIC DYSFUNCTION: ICD-10-CM

## 2018-07-30 ENCOUNTER — HOSPITAL ENCOUNTER (OUTPATIENT)
Dept: NUCLEAR MEDICINE | Facility: HOSPITAL | Age: 53
Discharge: HOME | End: 2018-07-30
Attending: INTERNAL MEDICINE
Payer: COMMERCIAL

## 2018-07-30 ENCOUNTER — HOSPITAL ENCOUNTER (EMERGENCY)
Facility: HOSPITAL | Age: 53
Discharge: STILL ADMITTED OR ADMINISTRATIVE | End: 2018-07-30
Payer: COMMERCIAL

## 2018-07-30 ENCOUNTER — HOSPITAL ENCOUNTER (OUTPATIENT)
Dept: NUCLEAR MEDICINE | Facility: HOSPITAL | Age: 53
Setting detail: OBSERVATION
Discharge: HOME | End: 2018-08-01
Attending: HOSPITALIST | Admitting: HOSPITALIST
Payer: COMMERCIAL

## 2018-07-30 ENCOUNTER — HOSPITAL ENCOUNTER (OUTPATIENT)
Dept: CV DIAGNOSTICS | Facility: HOSPITAL | Age: 53
Discharge: HOME | End: 2018-07-30
Attending: INTERNAL MEDICINE
Payer: COMMERCIAL

## 2018-07-30 DIAGNOSIS — R77.8 ELEVATED TROPONIN: ICD-10-CM

## 2018-07-30 DIAGNOSIS — I51.89 DIASTOLIC DYSFUNCTION: ICD-10-CM

## 2018-07-30 PROBLEM — R07.89 CHEST TIGHTNESS: Status: ACTIVE | Noted: 2018-07-30

## 2018-07-30 PROBLEM — R94.39 ABNORMAL NUCLEAR STRESS TEST: Status: ACTIVE | Noted: 2018-07-30

## 2018-07-30 PROCEDURE — 99220 INITIAL OBSERVATION CARE,LEVL III: CPT | Performed by: HOSPITALIST

## 2018-07-30 PROCEDURE — 93016 CV STRESS TEST SUPVJ ONLY: CPT | Performed by: INTERNAL MEDICINE

## 2018-07-30 PROCEDURE — 93017 CV STRESS TEST TRACING ONLY: CPT | Performed by: INTERNAL MEDICINE

## 2018-07-30 PROCEDURE — 93018 CV STRESS TEST I&R ONLY: CPT | Performed by: INTERNAL MEDICINE

## 2018-07-30 PROCEDURE — 78452 HT MUSCLE IMAGE SPECT MULT: CPT | Performed by: INTERNAL MEDICINE

## 2018-07-30 RX ORDER — ACETAMINOPHEN 325 MG/1
650 TABLET ORAL EVERY 6 HOURS PRN
Status: DISCONTINUED | OUTPATIENT
Start: 2018-07-30 | End: 2018-08-01

## 2018-07-30 RX ORDER — NAPROXEN SODIUM 220 MG
1-2 TABLET ORAL
COMMUNITY
End: 2018-08-29

## 2018-07-30 RX ORDER — TRIAMTERENE AND HYDROCHLOROTHIAZIDE 75; 50 MG/1; MG/1
0.5 TABLET ORAL DAILY
Status: DISCONTINUED | OUTPATIENT
Start: 2018-07-31 | End: 2018-08-01

## 2018-07-30 RX ORDER — 0.9 % SODIUM CHLORIDE 0.9 %
VIAL (ML) INJECTION
Status: COMPLETED
Start: 2018-07-30 | End: 2018-07-30

## 2018-07-30 RX ORDER — ONDANSETRON 2 MG/ML
4 INJECTION INTRAMUSCULAR; INTRAVENOUS EVERY 6 HOURS PRN
Status: DISCONTINUED | OUTPATIENT
Start: 2018-07-30 | End: 2018-08-01

## 2018-07-30 RX ORDER — HYDRALAZINE HYDROCHLORIDE 20 MG/ML
10 INJECTION INTRAMUSCULAR; INTRAVENOUS EVERY 4 HOURS PRN
Status: DISCONTINUED | OUTPATIENT
Start: 2018-07-30 | End: 2018-08-01

## 2018-07-30 RX ORDER — ALBUTEROL SULFATE 90 UG/1
2 AEROSOL, METERED RESPIRATORY (INHALATION) EVERY 6 HOURS PRN
Status: DISCONTINUED | OUTPATIENT
Start: 2018-07-30 | End: 2018-08-01

## 2018-07-30 RX ORDER — PANTOPRAZOLE SODIUM 40 MG/1
40 TABLET, DELAYED RELEASE ORAL
Status: DISCONTINUED | OUTPATIENT
Start: 2018-07-31 | End: 2018-08-01

## 2018-07-30 RX ORDER — HEPARIN SODIUM 5000 [USP'U]/ML
5000 INJECTION, SOLUTION INTRAVENOUS; SUBCUTANEOUS EVERY 12 HOURS SCHEDULED
Status: DISCONTINUED | OUTPATIENT
Start: 2018-07-31 | End: 2018-08-01

## 2018-07-30 RX ADMIN — 0.9 % SODIUM CHLORIDE: 0.9 % VIAL (ML) INJECTION at 09:41:00

## 2018-07-31 ENCOUNTER — APPOINTMENT (OUTPATIENT)
Dept: INTERVENTIONAL RADIOLOGY/VASCULAR | Facility: HOSPITAL | Age: 53
End: 2018-07-31
Attending: INTERNAL MEDICINE
Payer: COMMERCIAL

## 2018-07-31 LAB
ANION GAP SERPL CALC-SCNC: 9 MMOL/L (ref 0–18)
BASOPHILS # BLD: 0 K/UL (ref 0–0.2)
BASOPHILS NFR BLD: 1 %
BUN SERPL-MCNC: 16 MG/DL (ref 8–20)
BUN/CREAT SERPL: 21.1 (ref 10–20)
CALCIUM SERPL-MCNC: 9.2 MG/DL (ref 8.5–10.5)
CHLORIDE SERPL-SCNC: 101 MMOL/L (ref 95–110)
CO2 SERPL-SCNC: 28 MMOL/L (ref 22–32)
CREAT SERPL-MCNC: 0.76 MG/DL (ref 0.5–1.5)
EOSINOPHIL # BLD: 0.3 K/UL (ref 0–0.7)
EOSINOPHIL NFR BLD: 4 %
ERYTHROCYTE [DISTWIDTH] IN BLOOD BY AUTOMATED COUNT: 13.8 % (ref 11–15)
GLUCOSE SERPL-MCNC: 98 MG/DL (ref 70–99)
HCT VFR BLD AUTO: 36.5 % (ref 35–48)
HGB BLD-MCNC: 12.3 G/DL (ref 12–16)
LYMPHOCYTES # BLD: 2.1 K/UL (ref 1–4)
LYMPHOCYTES NFR BLD: 34 %
MCH RBC QN AUTO: 31 PG (ref 27–32)
MCHC RBC AUTO-ENTMCNC: 33.8 G/DL (ref 32–37)
MCV RBC AUTO: 91.8 FL (ref 80–100)
MONOCYTES # BLD: 0.5 K/UL (ref 0–1)
MONOCYTES NFR BLD: 9 %
NEUTROPHILS # BLD AUTO: 3.1 K/UL (ref 1.8–7.7)
NEUTROPHILS NFR BLD: 52 %
OSMOLALITY UR CALC.SUM OF ELEC: 287 MOSM/KG (ref 275–295)
PLATELET # BLD AUTO: 300 K/UL (ref 140–400)
PMV BLD AUTO: 8 FL (ref 7.4–10.3)
POTASSIUM SERPL-SCNC: 3.5 MMOL/L (ref 3.3–5.1)
POTASSIUM SERPL-SCNC: 4.3 MMOL/L (ref 3.3–5.1)
RBC # BLD AUTO: 3.97 M/UL (ref 3.7–5.4)
SODIUM SERPL-SCNC: 138 MMOL/L (ref 136–144)
TROPONIN I SERPL-MCNC: 0.01 NG/ML (ref ?–0.03)
WBC # BLD AUTO: 6 K/UL (ref 4–11)

## 2018-07-31 PROCEDURE — 4A023N7 MEASUREMENT OF CARDIAC SAMPLING AND PRESSURE, LEFT HEART, PERCUTANEOUS APPROACH: ICD-10-PCS | Performed by: INTERNAL MEDICINE

## 2018-07-31 PROCEDURE — 99226 SUBSEQUENT OBSERVATION CARE: CPT | Performed by: HOSPITALIST

## 2018-07-31 PROCEDURE — B2111ZZ FLUOROSCOPY OF MULTIPLE CORONARY ARTERIES USING LOW OSMOLAR CONTRAST: ICD-10-PCS | Performed by: INTERNAL MEDICINE

## 2018-07-31 RX ORDER — ALPRAZOLAM 0.25 MG/1
0.25 TABLET ORAL ONCE AS NEEDED
Status: DISCONTINUED | OUTPATIENT
Start: 2018-08-01 | End: 2018-08-01

## 2018-07-31 RX ORDER — VERAPAMIL HYDROCHLORIDE 2.5 MG/ML
INJECTION, SOLUTION INTRAVENOUS
Status: COMPLETED
Start: 2018-07-31 | End: 2018-07-31

## 2018-07-31 RX ORDER — SODIUM CHLORIDE 9 MG/ML
INJECTION, SOLUTION INTRAVENOUS
Status: COMPLETED
Start: 2018-07-31 | End: 2018-08-01

## 2018-07-31 RX ORDER — DIPHENHYDRAMINE HYDROCHLORIDE 50 MG/ML
INJECTION INTRAMUSCULAR; INTRAVENOUS
Status: COMPLETED
Start: 2018-07-31 | End: 2018-07-31

## 2018-07-31 RX ORDER — LIDOCAINE HYDROCHLORIDE 20 MG/ML
INJECTION, SOLUTION EPIDURAL; INFILTRATION; INTRACAUDAL; PERINEURAL
Status: COMPLETED
Start: 2018-07-31 | End: 2018-07-31

## 2018-07-31 RX ORDER — MIDAZOLAM HYDROCHLORIDE 1 MG/ML
INJECTION INTRAMUSCULAR; INTRAVENOUS
Status: COMPLETED
Start: 2018-07-31 | End: 2018-07-31

## 2018-07-31 RX ORDER — METOPROLOL TARTRATE 50 MG/1
50 TABLET, FILM COATED ORAL ONCE AS NEEDED
Status: DISCONTINUED | OUTPATIENT
Start: 2018-07-31 | End: 2018-08-01

## 2018-07-31 RX ORDER — SODIUM CHLORIDE 9 MG/ML
INJECTION, SOLUTION INTRAVENOUS CONTINUOUS
Status: ACTIVE | OUTPATIENT
Start: 2018-07-31 | End: 2018-07-31

## 2018-07-31 RX ORDER — ASPIRIN 81 MG/1
243 TABLET ORAL ONCE
Status: COMPLETED | OUTPATIENT
Start: 2018-07-31 | End: 2018-07-31

## 2018-07-31 RX ORDER — 0.9 % SODIUM CHLORIDE 0.9 %
VIAL (ML) INJECTION
Status: COMPLETED
Start: 2018-07-31 | End: 2018-07-31

## 2018-07-31 RX ORDER — POTASSIUM CHLORIDE 20 MEQ/1
40 TABLET, EXTENDED RELEASE ORAL EVERY 4 HOURS
Status: COMPLETED | OUTPATIENT
Start: 2018-07-31 | End: 2018-07-31

## 2018-07-31 RX ORDER — METOPROLOL TARTRATE 50 MG/1
50 TABLET, FILM COATED ORAL ONCE
Status: COMPLETED | OUTPATIENT
Start: 2018-08-01 | End: 2018-08-01

## 2018-07-31 RX ORDER — METOPROLOL TARTRATE 100 MG/1
100 TABLET ORAL ONCE AS NEEDED
Status: DISCONTINUED | OUTPATIENT
Start: 2018-07-31 | End: 2018-08-01

## 2018-07-31 RX ORDER — IBUPROFEN 400 MG/1
800 TABLET ORAL ONCE
Status: DISCONTINUED | OUTPATIENT
Start: 2018-07-31 | End: 2018-07-31

## 2018-07-31 RX ORDER — HEPARIN SODIUM 1000 [USP'U]/ML
INJECTION, SOLUTION INTRAVENOUS; SUBCUTANEOUS
Status: COMPLETED
Start: 2018-07-31 | End: 2018-07-31

## 2018-07-31 RX ORDER — METOPROLOL TARTRATE 5 MG/5ML
5 INJECTION INTRAVENOUS SEE ADMIN INSTRUCTIONS
Status: ACTIVE | OUTPATIENT
Start: 2018-07-31 | End: 2018-08-01

## 2018-07-31 RX ORDER — IPRATROPIUM BROMIDE AND ALBUTEROL SULFATE 2.5; .5 MG/3ML; MG/3ML
3 SOLUTION RESPIRATORY (INHALATION) EVERY 6 HOURS PRN
Status: DISCONTINUED | OUTPATIENT
Start: 2018-07-31 | End: 2018-08-01

## 2018-07-31 RX ORDER — METOPROLOL TARTRATE 100 MG/1
100 TABLET ORAL ONCE AS NEEDED
Status: ACTIVE | OUTPATIENT
Start: 2018-07-31 | End: 2018-07-31

## 2018-07-31 RX ORDER — METOPROLOL TARTRATE 100 MG/1
100 TABLET ORAL ONCE
Status: COMPLETED | OUTPATIENT
Start: 2018-07-31 | End: 2018-07-31

## 2018-07-31 RX ORDER — KETOROLAC TROMETHAMINE 15 MG/ML
15 INJECTION, SOLUTION INTRAMUSCULAR; INTRAVENOUS ONCE
Status: COMPLETED | OUTPATIENT
Start: 2018-07-31 | End: 2018-07-31

## 2018-07-31 RX ORDER — ROSUVASTATIN CALCIUM 10 MG/1
10 TABLET, COATED ORAL NIGHTLY
Status: DISCONTINUED | OUTPATIENT
Start: 2018-07-31 | End: 2018-08-01

## 2018-07-31 RX ORDER — ASPIRIN 81 MG/1
81 TABLET ORAL DAILY
Status: DISCONTINUED | OUTPATIENT
Start: 2018-07-31 | End: 2018-08-01

## 2018-07-31 RX ORDER — METOPROLOL TARTRATE 50 MG/1
50 TABLET, FILM COATED ORAL ONCE AS NEEDED
Status: DISPENSED | OUTPATIENT
Start: 2018-07-31 | End: 2018-07-31

## 2018-07-31 RX ORDER — METOPROLOL TARTRATE 50 MG/1
50 TABLET, FILM COATED ORAL ONCE AS NEEDED
Status: DISCONTINUED | OUTPATIENT
Start: 2018-08-01 | End: 2018-08-01

## 2018-07-31 RX ORDER — DILTIAZEM HYDROCHLORIDE 5 MG/ML
10 INJECTION INTRAVENOUS SEE ADMIN INSTRUCTIONS
Status: ACTIVE | OUTPATIENT
Start: 2018-07-31 | End: 2018-08-01

## 2018-07-31 RX ORDER — METOPROLOL TARTRATE 50 MG/1
50 TABLET, FILM COATED ORAL ONCE
Status: COMPLETED | OUTPATIENT
Start: 2018-07-31 | End: 2018-07-31

## 2018-07-31 RX ORDER — METOPROLOL TARTRATE 100 MG/1
100 TABLET ORAL ONCE
Status: COMPLETED | OUTPATIENT
Start: 2018-08-01 | End: 2018-08-01

## 2018-07-31 RX ORDER — SODIUM CHLORIDE 9 MG/ML
INJECTION, SOLUTION INTRAVENOUS CONTINUOUS
Status: DISCONTINUED | OUTPATIENT
Start: 2018-07-31 | End: 2018-07-31

## 2018-07-31 RX ADMIN — HEPARIN SODIUM 5000 UNITS: 5000 INJECTION, SOLUTION INTRAVENOUS; SUBCUTANEOUS at 21:19:00

## 2018-07-31 RX ADMIN — 0.9 % SODIUM CHLORIDE 10 ML: 0.9 % VIAL (ML) INJECTION at 06:46:00

## 2018-07-31 RX ADMIN — METOPROLOL TARTRATE 50 MG: 50 TABLET, FILM COATED ORAL at 21:17:00

## 2018-07-31 RX ADMIN — ROSUVASTATIN CALCIUM 10 MG: 10 TABLET, COATED ORAL at 21:17:00

## 2018-07-31 RX ADMIN — ROSUVASTATIN CALCIUM 10 MG: 10 TABLET, COATED ORAL at 00:42:00

## 2018-07-31 RX ADMIN — POTASSIUM CHLORIDE 40 MEQ: 20 TABLET, EXTENDED RELEASE ORAL at 09:01:00

## 2018-07-31 RX ADMIN — SODIUM CHLORIDE: 9 INJECTION, SOLUTION INTRAVENOUS at 16:00:00

## 2018-07-31 RX ADMIN — ASPIRIN 243 MG: 81 TABLET ORAL at 12:11:00

## 2018-07-31 RX ADMIN — TRIAMTERENE AND HYDROCHLOROTHIAZIDE 0.5 TABLET: 75; 50 TABLET ORAL at 09:01:00

## 2018-07-31 RX ADMIN — SODIUM CHLORIDE 1000 ML: 9 INJECTION, SOLUTION INTRAVENOUS at 06:45:00

## 2018-07-31 RX ADMIN — KETOROLAC TROMETHAMINE 15 MG: 15 INJECTION, SOLUTION INTRAMUSCULAR; INTRAVENOUS at 07:04:00

## 2018-07-31 RX ADMIN — ASPIRIN 81 MG: 81 TABLET ORAL at 00:42:00

## 2018-07-31 RX ADMIN — POTASSIUM CHLORIDE 40 MEQ: 20 TABLET, EXTENDED RELEASE ORAL at 12:12:00

## 2018-07-31 RX ADMIN — PANTOPRAZOLE SODIUM 40 MG: 40 TABLET, DELAYED RELEASE ORAL at 06:45:00

## 2018-07-31 NOTE — PROGRESS NOTES
[facility]    Progress Note    Jacinto Johnson Patient Status:  Observation    1965 MRN [id number]   Location [facility] 3W/SW Attending Kenneth Allred MD   Hosp Day # 0 PCP Jacques Hurd MD       Subjective:     No CP or SO

## 2018-07-31 NOTE — PROCEDURES
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

## 2018-07-31 NOTE — H&P
[facility] Patient Status:  Observation    1965 MRN [id number]   Location [facility] 3W/SW Attending Amalia Michelle MD   Hosp Day # 0 PCP Christopher Thomas MD     Date:  2018  Date o OTHER (SEE COMMENTS)    Comment:mirgraine/headaches    Home Medications:  Prior to Admission Medications   Prescriptions Last Dose Informant Patient Reported? Taking?    Albuterol Sulfate  (90 Base) MCG/ACT Inhalation Aero Soln   Yes No   Sig: I Negative. Integumentary:  Negative. Neurologic:  Negative. Psychiatric:  Negative. ROS reviewed as documented in chart    Physical Exam:       General:  Alert and oriented. Diffuse skin problem:  None.   Eye:  Pupils are equal, round and reactive to li

## 2018-07-31 NOTE — PROGRESS NOTES
Procedure hand off report given to [facility]. Pt's vital signs are stable. Procedural access site (Right groin) is dry and intact with no signs and symptoms of bleeding and hematoma.  Right wrist with TR Band in place, no active bleeding, dried blood no

## 2018-07-31 NOTE — CONSULTS
[facility]  Cardiology Consultation    Anne Casey Patient Status:  Observation    1965 MRN [id number]   Location [facility] 3W/SW Attending Dee Feliciano MD   Hosp Day # 0 PCP Giovani An MD     Reason for Consultation:  abnml [facility]) injection 4 mg, 4 mg, Intravenous, Q6H PRN  •  acetaminophen (TYLENOL) tab 650 mg, 650 mg, Oral, Q6H PRN  •  [START ON 7/31/2018] Heparin Sodium (Porcine) 5000 UNIT/ML injection 5,000 Units, 5,000 Units, Subcutaneous, 2 times per day  •  hydrALAzi

## 2018-08-01 ENCOUNTER — APPOINTMENT (OUTPATIENT)
Dept: CT IMAGING | Facility: HOSPITAL | Age: 53
End: 2018-08-01
Attending: INTERNAL MEDICINE
Payer: COMMERCIAL

## 2018-08-01 VITALS
HEIGHT: 67 IN | WEIGHT: 293 LBS | SYSTOLIC BLOOD PRESSURE: 144 MMHG | RESPIRATION RATE: 17 BRPM | DIASTOLIC BLOOD PRESSURE: 52 MMHG | OXYGEN SATURATION: 97 % | HEART RATE: 57 BPM | BODY MASS INDEX: 45.99 KG/M2 | TEMPERATURE: 98 F

## 2018-08-01 LAB
ANION GAP SERPL CALC-SCNC: 8 MMOL/L (ref 0–18)
BASOPHILS # BLD: 0 K/UL (ref 0–0.2)
BASOPHILS NFR BLD: 1 %
BUN SERPL-MCNC: 15 MG/DL (ref 8–20)
BUN/CREAT SERPL: 17.4 (ref 10–20)
CALCIUM SERPL-MCNC: 9.2 MG/DL (ref 8.5–10.5)
CHLORIDE SERPL-SCNC: 100 MMOL/L (ref 95–110)
CO2 SERPL-SCNC: 29 MMOL/L (ref 22–32)
CREAT SERPL-MCNC: 0.86 MG/DL (ref 0.5–1.5)
EOSINOPHIL # BLD: 0.2 K/UL (ref 0–0.7)
EOSINOPHIL NFR BLD: 4 %
ERYTHROCYTE [DISTWIDTH] IN BLOOD BY AUTOMATED COUNT: 14.1 % (ref 11–15)
GLUCOSE SERPL-MCNC: 110 MG/DL (ref 70–99)
HCT VFR BLD AUTO: 34.9 % (ref 35–48)
HGB BLD-MCNC: 11.7 G/DL (ref 12–16)
LYMPHOCYTES # BLD: 1.5 K/UL (ref 1–4)
LYMPHOCYTES NFR BLD: 33 %
MCH RBC QN AUTO: 31 PG (ref 27–32)
MCHC RBC AUTO-ENTMCNC: 33.6 G/DL (ref 32–37)
MCV RBC AUTO: 92.2 FL (ref 80–100)
MONOCYTES # BLD: 0.4 K/UL (ref 0–1)
MONOCYTES NFR BLD: 9 %
NEUTROPHILS # BLD AUTO: 2.5 K/UL (ref 1.8–7.7)
NEUTROPHILS NFR BLD: 54 %
OSMOLALITY UR CALC.SUM OF ELEC: 285 MOSM/KG (ref 275–295)
PLATELET # BLD AUTO: 289 K/UL (ref 140–400)
PMV BLD AUTO: 8.3 FL (ref 7.4–10.3)
POTASSIUM SERPL-SCNC: 4.7 MMOL/L (ref 3.3–5.1)
RBC # BLD AUTO: 3.79 M/UL (ref 3.7–5.4)
SODIUM SERPL-SCNC: 137 MMOL/L (ref 136–144)
WBC # BLD AUTO: 4.6 K/UL (ref 4–11)

## 2018-08-01 PROCEDURE — 75574 CT ANGIO HRT W/3D IMAGE: CPT | Performed by: INTERNAL MEDICINE

## 2018-08-01 PROCEDURE — 99217 OBSERVATION CARE DISCHARGE: CPT | Performed by: HOSPITALIST

## 2018-08-01 RX ORDER — IBUPROFEN 400 MG/1
800 TABLET ORAL ONCE
Status: COMPLETED | OUTPATIENT
Start: 2018-08-01 | End: 2018-08-01

## 2018-08-01 RX ORDER — METOPROLOL TARTRATE 5 MG/5ML
INJECTION INTRAVENOUS
Status: COMPLETED
Start: 2018-08-01 | End: 2018-08-01

## 2018-08-01 RX ORDER — NITROGLYCERIN 0.4 MG/1
0.4 TABLET SUBLINGUAL EVERY 5 MIN PRN
Status: DISCONTINUED | OUTPATIENT
Start: 2018-08-01 | End: 2018-08-01

## 2018-08-01 RX ORDER — ROSUVASTATIN CALCIUM 10 MG/1
10 TABLET, COATED ORAL NIGHTLY
Qty: 30 TABLET | Refills: 0 | Status: SHIPPED | OUTPATIENT
Start: 2018-08-01 | End: 2018-08-01

## 2018-08-01 RX ADMIN — PANTOPRAZOLE SODIUM 40 MG: 40 TABLET, DELAYED RELEASE ORAL at 06:12:00

## 2018-08-01 RX ADMIN — NITROGLYCERIN 0.4 MG: 0.4 TABLET SUBLINGUAL at 09:32:00

## 2018-08-01 RX ADMIN — TRIAMTERENE AND HYDROCHLOROTHIAZIDE 0.5 TABLET: 75; 50 TABLET ORAL at 08:43:00

## 2018-08-01 RX ADMIN — HEPARIN SODIUM 5000 UNITS: 5000 INJECTION, SOLUTION INTRAVENOUS; SUBCUTANEOUS at 08:44:00

## 2018-08-01 RX ADMIN — ACETAMINOPHEN 650 MG: 325 TABLET ORAL at 02:21:00

## 2018-08-01 RX ADMIN — ASPIRIN 81 MG: 81 TABLET ORAL at 08:43:00

## 2018-08-01 RX ADMIN — METOPROLOL TARTRATE 50 MG: 50 TABLET, FILM COATED ORAL at 08:44:00

## 2018-08-01 RX ADMIN — IBUPROFEN 800 MG: 400 TABLET ORAL at 11:34:00

## 2018-08-01 RX ADMIN — METOPROLOL TARTRATE 5 MG: 5 INJECTION INTRAVENOUS at 09:30:00

## 2018-08-01 NOTE — PROGRESS NOTES
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   D1 has > 50-70% stenosis but it is < 2 mm vessel      Assessment and Plan:       Atypical Chest pain now s/p cath and negative CTA to evaluate anomalous LMA   - Troponins negativ

## 2018-08-01 NOTE — DISCHARGE SUMMARY
[facility]  Discharge Summary     Cheyenne Boyer  : 1965    Status: Observation  Day #: 0    Attending: Linh Cyr MD  PCP: Lemuel Chang MD     Date of Admission: 2018  Date of Discharge: 2018     Hospital Discharge Diagnos Magnesium 40 MG Cpdr  Commonly known as:  NEXIUM  What changed:  · when to take this  · reasons to take this      Take 1 capsule (40 mg total) by mouth every morning before breakfast.   Quantity:  60 capsule  Refills:  0     Mometasone Furoate 50 MCG/ACT S 30 tablet  Refills:  5     ValACYclovir HCl 1 G Tabs  Commonly known as:  VALTREX      TAKE 1 TABLET (1,000 MG TOTAL) BY MOUTH DAILY.    Quantity:  30 tablet  Refills:  4              Hospital Discharge Diagnoses: chest pain    Lace+ Score: 17  59-90 High

## 2018-08-01 NOTE — IMAGING NOTE
Coronary CTA performed. 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

## 2018-08-01 NOTE — PROGRESS NOTES
[facility]  Progress Note     Ileana Louise  : 1965    Status: Observation  Day #: 0    Attending: Dexter High MD  PCP: Angel Torrez MD      Assessment and Plan     Abnormal stress test showing evidence of small reversible defect in 08/01/18   0613   BUN  16   --   15   CREATSERUM  0.76   --   0.86   GFRAA  >60   --   >60   GFRNAA  >60   --   >60   CA  9.2   --   9.2   NA  138   --   137   K  3.5  4.3  4.7   CL  101   --   100   CO2  28   --   29   GLU  98   --   110*   TROP  0.01   -

## 2018-08-01 NOTE — PLAN OF CARE
CARDIOVASCULAR - ADULT    • Maintains optimal cardiac output and hemodynamic stability Completed    • Absence of cardiac arrhythmias or at baseline Completed    Monitoring     Patient Centered Care    • Patient preferences are identified and integrated in

## 2018-08-01 NOTE — TRANSITION NOTE
Primary Cardiologist: Dr. Judith Camilo  Subjective:   Subjective:  48year old female who presented with chest tightness. Constitutional: Negative. Respiratory: Negative.     Cardiovascular: Negative.          Objective:   Blood pressure 144/52, pulse 57, negative CTA to evaluate anomalous LMA   - Troponins negative / no acute finding on EKG but abnormal nuclear stress  - Cath with 0% throughout seen on cath  - Do not recommend that she needs to be on a statin at this time as she does not fall into any of t Powder, Breath Activated  Inhale 1 puff into the lungs daily. HAWTHORN OR  Take by mouth. ibuprofen 800 MG Oral Tab  Take 800 mg by mouth every 6 (six) hours as needed for Pain.              Mometasone Furoate (NASONEX) 50 MCG/ACT Na

## 2018-08-01 NOTE — IMAGING NOTE
HX TAKEN came in with sob  Had abnormal stress test had had cardiac cath yesterday  Showed small reversible defect in lateral wall.  Strong family hx of cfh mom brother and sister PT CONSENTED by floor rn    25 GAUGE IV STARTED by floor rn left forearm GF

## 2018-08-02 ENCOUNTER — PATIENT OUTREACH (OUTPATIENT)
Dept: CASE MANAGEMENT | Age: 53
End: 2018-08-02

## 2018-08-03 NOTE — PROGRESS NOTES
NCM talked to patient, patient states she cannot talk right now and requests a call back at a later time.

## 2018-08-13 RX ORDER — TRIAMTERENE AND HYDROCHLOROTHIAZIDE 37.5; 25 MG/1; MG/1
CAPSULE ORAL
Qty: 30 CAPSULE | Refills: 0 | Status: SHIPPED | OUTPATIENT
Start: 2018-08-13 | End: 2018-08-29

## 2018-08-28 ENCOUNTER — HOSPITAL ENCOUNTER (OUTPATIENT)
Dept: GENERAL RADIOLOGY | Facility: HOSPITAL | Age: 53
Discharge: HOME | End: 2018-08-28
Attending: INTERNAL MEDICINE
Payer: COMMERCIAL

## 2018-08-28 DIAGNOSIS — R05.9 COUGH: ICD-10-CM

## 2018-08-28 PROCEDURE — 71046 X-RAY EXAM CHEST 2 VIEWS: CPT | Performed by: INTERNAL MEDICINE

## 2018-09-10 RX ORDER — TRIAMTERENE AND HYDROCHLOROTHIAZIDE 37.5; 25 MG/1; MG/1
CAPSULE ORAL
Qty: 30 CAPSULE | Refills: 0 | Status: SHIPPED | OUTPATIENT
Start: 2018-09-10 | End: 2018-10-11

## 2018-10-02 ENCOUNTER — OFFICE VISIT (OUTPATIENT)
Dept: PODIATRY CLINIC | Facility: CLINIC | Age: 53
End: 2018-10-02
Payer: COMMERCIAL

## 2018-10-02 ENCOUNTER — HOSPITAL ENCOUNTER (OUTPATIENT)
Dept: GENERAL RADIOLOGY | Facility: HOSPITAL | Age: 53
Discharge: HOME | End: 2018-10-02
Attending: PODIATRIST
Payer: COMMERCIAL

## 2018-10-02 DIAGNOSIS — M20.41 HAMMER TOE OF RIGHT FOOT: ICD-10-CM

## 2018-10-02 DIAGNOSIS — M79.671 RIGHT FOOT PAIN: Primary | ICD-10-CM

## 2018-10-02 DIAGNOSIS — M21.611 BUNION OF GREAT TOE OF RIGHT FOOT: ICD-10-CM

## 2018-10-02 DIAGNOSIS — M79.671 RIGHT FOOT PAIN: ICD-10-CM

## 2018-10-02 PROCEDURE — 73630 X-RAY EXAM OF FOOT: CPT | Performed by: PODIATRIST

## 2018-10-02 PROCEDURE — 99212 OFFICE O/P EST SF 10 MIN: CPT | Performed by: PODIATRIST

## 2018-10-02 PROCEDURE — 99243 OFF/OP CNSLTJ NEW/EST LOW 30: CPT | Performed by: PODIATRIST

## 2018-10-02 NOTE — PROGRESS NOTES
HPI:    Patient ID: Suresh Noel is a 48year old female. HPI  This pleasant 59-year-old female presents as a new patient to me on consult from [address].   Patient states that she is here because of progressive and increasing pain associated with he every morning before breakfast. (Patient taking differently: Take 40 mg by mouth daily as needed.  ) Disp: 60 capsule Rfl: 0   Mometasone Furoate (NASONEX) 50 MCG/ACT Nasal Suspension 2 sprays by Nasal route daily.  (Patient taking differently: 2 sprays by would be standard practice for her to return after 1 week and therefore she could work at home. I discussed all options patient is very aware and understands. I answered all of her questions this my impression she is well-informed.   Patient clearly under

## 2018-10-05 ENCOUNTER — TELEPHONE (OUTPATIENT)
Dept: PODIATRY CLINIC | Facility: CLINIC | Age: 53
End: 2018-10-05

## 2018-10-05 NOTE — TELEPHONE ENCOUNTER
Called and spoke to pt surgery is scheduled at [facility] on 11/21/18. Pt's PO appointments with  are scheduled on 11/28/18 and 12/05/18 both at 1020.   Pt informed sx center will call the day before surgery with a time to arrive and all other instructions

## 2018-10-05 NOTE — TELEPHONE ENCOUNTER
Patient calling to schedule surgery. And also has questions regarding recovery time. Please call. Thank you.

## 2018-10-11 ENCOUNTER — TELEPHONE (OUTPATIENT)
Dept: PODIATRY CLINIC | Facility: CLINIC | Age: 53
End: 2018-10-11

## 2018-10-11 RX ORDER — TRIAMTERENE AND HYDROCHLOROTHIAZIDE 37.5; 25 MG/1; MG/1
CAPSULE ORAL
Qty: 30 CAPSULE | Refills: 0 | Status: SHIPPED | OUTPATIENT
Start: 2018-10-11 | End: 2018-11-08

## 2018-10-11 NOTE — TELEPHONE ENCOUNTER
Called and spoke to pt. Informed her there is not a sooner surgery date available. In regards to pt's PO questions an encounter was made on 10/05. Please call back and advise.

## 2018-10-11 NOTE — TELEPHONE ENCOUNTER
Pt calling with surgical questions, also to see if any sooner openings for surgery are available. Please advise, thank you.

## 2018-10-16 NOTE — TELEPHONE ENCOUNTER
Spoke to pt. Pt is scheduled for Sx with SCR on 11/21/18. Pt has the following concerns:  - How long will she be off work? - Would she be able to work from home? If so, she will need a letter to do so.    - What are her driving restrictions and for how l

## 2018-10-16 NOTE — TELEPHONE ENCOUNTER
Based on the # of questions I feel it best if she comes in to review . Please schedule a preop consult.  Thank you scr

## 2018-10-17 NOTE — TELEPHONE ENCOUNTER
Spoke to pt and informed her of SCR response. Pt does not want to schedule a pre op appt and miss work and pay for another appt due to high deductible.  Informed pt that SCR wants to be sure that his surgical patients are well informed prior to surgery and

## 2018-10-18 ENCOUNTER — TELEPHONE (OUTPATIENT)
Dept: PODIATRY CLINIC | Facility: CLINIC | Age: 53
End: 2018-10-18

## 2018-10-18 NOTE — TELEPHONE ENCOUNTER
Spoke to pt regarding FMLA paperwork that will be emailed to the Forms dept. Will email HIPAA packet and pt is aware of fees.

## 2018-10-24 DIAGNOSIS — K82.4 GALL BLADDER POLYP: Primary | ICD-10-CM

## 2018-11-07 ENCOUNTER — TELEPHONE (OUTPATIENT)
Dept: PODIATRY CLINIC | Facility: CLINIC | Age: 53
End: 2018-11-07

## 2018-11-07 NOTE — TELEPHONE ENCOUNTER
FMLA recvd from pt via email. Will email pt HIPAA packet. Pt aware of fee and will pay at post op visit. Logged for processing.

## 2018-11-07 NOTE — TELEPHONE ENCOUNTER
Dr. Mildred Gallegos,    Please sign off on form:  -Highlight the patient and hit \"Chart\" button. -In Chart Review, w/in the Encounter tab - click 1 time on the Telephone call encounter for 10/18/18.  Scroll down the telephone encounter.  -Click \"scan on\" blue H

## 2018-11-08 RX ORDER — TRIAMTERENE AND HYDROCHLOROTHIAZIDE 37.5; 25 MG/1; MG/1
CAPSULE ORAL
Qty: 30 CAPSULE | Refills: 0 | Status: SHIPPED | OUTPATIENT
Start: 2018-11-08 | End: 2018-12-07

## 2018-11-12 ENCOUNTER — TELEPHONE (OUTPATIENT)
Dept: PODIATRY CLINIC | Facility: CLINIC | Age: 53
End: 2018-11-12

## 2018-11-13 NOTE — TELEPHONE ENCOUNTER
Call to Dylan No answer. Left voice message. Requesting call back to make post op appointment 7-10 days after day of surgery.

## 2018-11-15 ENCOUNTER — TELEPHONE (OUTPATIENT)
Dept: PODIATRY CLINIC | Facility: CLINIC | Age: 53
End: 2018-11-15

## 2018-11-15 NOTE — TELEPHONE ENCOUNTER
Called and requested PA for pt's surgery on 11/21/18  Spoke to Tristan Krishnan got a Ref #[id number]. Requesting more medical notes  Faxed all that was requested to [phone].

## 2018-11-16 NOTE — TELEPHONE ENCOUNTER
No appointments on the 27th Nov 26th [place] location you have 2 procedure slots only-   Do you want me to break one?

## 2018-11-16 NOTE — TELEPHONE ENCOUNTER
Pts surgery is on 11-21-18. 1 week post op will be the 28th unavailable.  Pt made 1st post op on 12-5-18 - 2 weeks post op    Do you want Dr. Shailesh Duckworth to do the 1st dressing change or is 2 weeks ok

## 2018-11-19 ENCOUNTER — TELEPHONE (OUTPATIENT)
Dept: PODIATRY CLINIC | Facility: CLINIC | Age: 53
End: 2018-11-19

## 2018-11-19 ENCOUNTER — OFFICE VISIT (OUTPATIENT)
Dept: INTERNAL MEDICINE CLINIC | Facility: CLINIC | Age: 53
End: 2018-11-19
Payer: COMMERCIAL

## 2018-11-19 VITALS
WEIGHT: 293 LBS | RESPIRATION RATE: 19 BRPM | HEIGHT: 67 IN | HEART RATE: 64 BPM | SYSTOLIC BLOOD PRESSURE: 130 MMHG | BODY MASS INDEX: 45.99 KG/M2 | TEMPERATURE: 98 F | DIASTOLIC BLOOD PRESSURE: 80 MMHG | OXYGEN SATURATION: 99 %

## 2018-11-19 DIAGNOSIS — M20.41 HAMMER TOE OF SECOND TOE OF RIGHT FOOT: ICD-10-CM

## 2018-11-19 DIAGNOSIS — E66.01 MORBID OBESITY WITH BMI OF 45.0-49.9, ADULT (HCC): ICD-10-CM

## 2018-11-19 DIAGNOSIS — J45.20 MILD INTERMITTENT ASTHMA WITHOUT COMPLICATION: ICD-10-CM

## 2018-11-19 DIAGNOSIS — M21.611 BUNION OF GREAT TOE OF RIGHT FOOT: ICD-10-CM

## 2018-11-19 DIAGNOSIS — Z01.818 PREOP EXAMINATION: Primary | ICD-10-CM

## 2018-11-19 DIAGNOSIS — I10 ESSENTIAL HYPERTENSION: ICD-10-CM

## 2018-11-19 PROCEDURE — 36415 COLL VENOUS BLD VENIPUNCTURE: CPT | Performed by: INTERNAL MEDICINE

## 2018-11-19 PROCEDURE — 80048 BASIC METABOLIC PNL TOTAL CA: CPT | Performed by: INTERNAL MEDICINE

## 2018-11-19 PROCEDURE — 85025 COMPLETE CBC W/AUTO DIFF WBC: CPT | Performed by: INTERNAL MEDICINE

## 2018-11-19 PROCEDURE — 99214 OFFICE O/P EST MOD 30 MIN: CPT | Performed by: INTERNAL MEDICINE

## 2018-11-19 NOTE — TELEPHONE ENCOUNTER
Received call from Dr. Mariza Pal office and spoke to Kingsley. Informed that typically our surgeon's want the PCP to do what they deem necessary to medically clear a patient for surgery. Informed that this was required by the anesthesiologist at [facility].   Pia Apley

## 2018-11-19 NOTE — TELEPHONE ENCOUNTER
She must be given a written rx for pain meds which will be given the day of surgery, there will be no need for dressings, I will change it in the office.  scr

## 2018-11-19 NOTE — PROGRESS NOTES
Non fasting blood draw administered in left arm   Draw successful   Marvin:  Cbc,bmp  D. O.B verified   Ordering Dr: Martha Vidal

## 2018-11-19 NOTE — TELEPHONE ENCOUNTER
Erika Melendrez from [facility] states that anesthesiologist is stating pt needs medical clearance for surgery. Surgery with Dr. Angela Prado is scheduled for 11/21/18 at [facility].

## 2018-11-19 NOTE — TELEPHONE ENCOUNTER
Patient called requesting to speak with RN Maggie Yee regarding Sx on 11/21, was trying to reach RN when call was disconnected. Please call. Thank you.

## 2018-11-19 NOTE — PROGRESS NOTES
Wanna Bloch is a 48year old female who presents for a pre-operative physical exam. Patient is to have Rt buniectomy and hammer toe repair  11/18/2018. to be done by Dr. Bella Minor at [facility]  on  11/21/2018.       HPI:     Patient  has prog COLONOSCOPY  2014   • ENDOSCOPY, BOWEL POUCH, BIOPSY  10/03/2018   • KNEE SURGERY  2012      Family History   Problem Relation Age of Onset   • Colon Polyps Father    • Diabetes Father    • Heart Disease Mother       Social History: Occ: Negotiates contrac Hematocrit      35.0 - 48.0 % 39.1   MCV      80.0 - 100.0 fL 93.7   MCH      27.0 - 32.0 pg 30.9   MCHC      32.0 - 37.0 g/dl 33.0   RDW      11.0 - 15.0 % 14.0   Platelet Count      109 - 400 K/   MEAN PLATELET VOLUME      7.4 - 10.3 fL 8.7   Eric

## 2018-11-19 NOTE — TELEPHONE ENCOUNTER
[facility] to follow up on aith. [facility] states surgery has been authorized ref # is the same [id number]. Thank You.

## 2018-11-19 NOTE — TELEPHONE ENCOUNTER
Spoke to pt and informed her that medical clearance is needed prior to surgery, per anesthesiologist. Pt is very angry because she states she cannot take off work to see PCP.   Pt states she will call her PCP to try to get her to state she is medically calvin

## 2018-11-20 ENCOUNTER — PATIENT MESSAGE (OUTPATIENT)
Dept: ALLERGY | Facility: CLINIC | Age: 53
End: 2018-11-20

## 2018-11-20 ENCOUNTER — TELEPHONE (OUTPATIENT)
Dept: PODIATRY CLINIC | Facility: CLINIC | Age: 53
End: 2018-11-20

## 2018-11-20 ENCOUNTER — HOSPITAL ENCOUNTER (OUTPATIENT)
Dept: ULTRASOUND IMAGING | Facility: HOSPITAL | Age: 53
Discharge: HOME | End: 2018-11-20
Attending: INTERNAL MEDICINE
Payer: COMMERCIAL

## 2018-11-20 DIAGNOSIS — K82.4 GALL BLADDER POLYP: ICD-10-CM

## 2018-11-20 PROCEDURE — 76705 ECHO EXAM OF ABDOMEN: CPT | Performed by: INTERNAL MEDICINE

## 2018-11-20 RX ORDER — MOMETASONE FUROATE 50 UG/1
SPRAY, METERED NASAL
Refills: 0 | OUTPATIENT
Start: 2018-11-20

## 2018-11-20 RX ORDER — MOMETASONE 50 UG/1
2 SPRAY, METERED NASAL DAILY
Qty: 1 INHALER | Refills: 1 | Status: SHIPPED | OUTPATIENT
Start: 2018-11-20 | End: 2019-10-29

## 2018-11-20 NOTE — TELEPHONE ENCOUNTER
Refill request for Nasonex refill. Pt LOV was 7/12/2018 med ordered at that time. Due for F/U 1/2019. Please advise.

## 2018-11-20 NOTE — TELEPHONE ENCOUNTER
Partha Mtz calling from out pt sx calling to follow up regarding pt's sx clearance. Please advise. Thank you.

## 2018-11-20 NOTE — TELEPHONE ENCOUNTER
S/w pt and she wants to know what the recovery will look like after sx, what time her sx is and if we rc'd her clearance from Dr. Onofre [place].   I informed her sx center doesn't call with the time until the day before sx.  I will call dr. Onofre [place] office to obtain clearan

## 2018-11-20 NOTE — TELEPHONE ENCOUNTER
S/w Kendell Tarango and she states she pulled up Dr. Rosana Weaver note in epic and will have it reviewed and will CB if any issues for sx.

## 2018-11-20 NOTE — TELEPHONE ENCOUNTER
Pt called stating pt spoke to the rn yesterday. Pt is scheduled for surgery 11-21-18. Declined to be transferred to the surgery scheduled. Pt is demanding to speak to a supervisor now. Call transferred to the rn.

## 2018-11-20 NOTE — TELEPHONE ENCOUNTER
From: Suresh Noel  To: Nereida Flores MD  Sent: 11/20/2018 12:20 PM CST  Subject: Prescription Question    Can I get a refill on the nasal spray? If so, please call it into the [facility] on [address].  Thank you

## 2018-11-26 ENCOUNTER — TELEPHONE (OUTPATIENT)
Dept: INTERNAL MEDICINE CLINIC | Facility: CLINIC | Age: 53
End: 2018-11-26

## 2018-11-27 ENCOUNTER — OFFICE VISIT (OUTPATIENT)
Dept: PODIATRY CLINIC | Facility: CLINIC | Age: 53
End: 2018-11-27
Payer: COMMERCIAL

## 2018-11-27 DIAGNOSIS — M20.41 HAMMER TOE OF RIGHT FOOT: ICD-10-CM

## 2018-11-27 DIAGNOSIS — M21.611 BUNION OF GREAT TOE OF RIGHT FOOT: Primary | ICD-10-CM

## 2018-11-27 PROCEDURE — 99212 OFFICE O/P EST SF 10 MIN: CPT | Performed by: PODIATRIST

## 2018-11-27 PROCEDURE — 99024 POSTOP FOLLOW-UP VISIT: CPT | Performed by: PODIATRIST

## 2018-11-27 NOTE — PROGRESS NOTES
HPI:    Patient ID: Jacinto Johnson is a 48year old female. 12-year-old female presents 1 week post hammertoe and bunion surgery of the right foot. Patient requires no pain medications.       ROS:     I did review medical status, medications and allergies

## 2018-12-04 ENCOUNTER — PRIOR ORIGINAL RECORDS (OUTPATIENT)
Dept: OTHER | Age: 53
End: 2018-12-04

## 2018-12-05 ENCOUNTER — OFFICE VISIT (OUTPATIENT)
Dept: PODIATRY CLINIC | Facility: CLINIC | Age: 53
End: 2018-12-05
Payer: COMMERCIAL

## 2018-12-05 ENCOUNTER — HOSPITAL ENCOUNTER (OUTPATIENT)
Dept: GENERAL RADIOLOGY | Facility: HOSPITAL | Age: 53
Discharge: HOME | End: 2018-12-05
Attending: PODIATRIST
Payer: COMMERCIAL

## 2018-12-05 DIAGNOSIS — Z47.89 ORTHOPEDIC AFTERCARE: ICD-10-CM

## 2018-12-05 DIAGNOSIS — Z47.89 ORTHOPEDIC AFTERCARE: Primary | ICD-10-CM

## 2018-12-05 DIAGNOSIS — M21.611 BUNION OF GREAT TOE OF RIGHT FOOT: ICD-10-CM

## 2018-12-05 DIAGNOSIS — M20.41 HAMMER TOE OF RIGHT FOOT: ICD-10-CM

## 2018-12-05 PROCEDURE — 99024 POSTOP FOLLOW-UP VISIT: CPT | Performed by: PODIATRIST

## 2018-12-05 PROCEDURE — 73630 X-RAY EXAM OF FOOT: CPT | Performed by: PODIATRIST

## 2018-12-05 PROCEDURE — 99212 OFFICE O/P EST SF 10 MIN: CPT | Performed by: PODIATRIST

## 2018-12-05 NOTE — PROGRESS NOTES
HPI:    Patient ID: Kaila Reynolds is a 48year old female. This 41-year-old female presents 2 weeks post right bunionectomy and hammertoe surgery. Patient has some discomfort but is my impression that she is doing as well as I would expect.       ROS:

## 2018-12-10 RX ORDER — TRIAMTERENE AND HYDROCHLOROTHIAZIDE 37.5; 25 MG/1; MG/1
1 CAPSULE ORAL
Qty: 90 CAPSULE | Refills: 1 | Status: SHIPPED | OUTPATIENT
Start: 2018-12-10 | End: 2019-07-08

## 2018-12-11 ENCOUNTER — TELEPHONE (OUTPATIENT)
Dept: ORTHOPEDICS CLINIC | Facility: CLINIC | Age: 53
End: 2018-12-11

## 2018-12-11 NOTE — TELEPHONE ENCOUNTER
Pt. requesting to change return date to return to work, as she continues to have problems and swelling. Pt. States that she is sched to come in tomorrow 12/12, so she would like to p/up letter, during her visit. , as she takes [employer] to get to and

## 2018-12-11 NOTE — TELEPHONE ENCOUNTER
Pt seen in office on 12-5-18. Last work note was for her to return to work three weeks after her surgery on 10-23-18  Please advise on work letter for continuation of being off.

## 2018-12-12 ENCOUNTER — OFFICE VISIT (OUTPATIENT)
Dept: PODIATRY CLINIC | Facility: CLINIC | Age: 53
End: 2018-12-12
Payer: COMMERCIAL

## 2018-12-12 DIAGNOSIS — M21.611 BUNION OF GREAT TOE OF RIGHT FOOT: Primary | ICD-10-CM

## 2018-12-12 DIAGNOSIS — M20.41 HAMMER TOE OF RIGHT FOOT: ICD-10-CM

## 2018-12-12 PROCEDURE — 99024 POSTOP FOLLOW-UP VISIT: CPT | Performed by: PODIATRIST

## 2018-12-12 PROCEDURE — 99212 OFFICE O/P EST SF 10 MIN: CPT | Performed by: PODIATRIST

## 2018-12-12 NOTE — PROGRESS NOTES
HPI:    Patient ID: Reyna Bailon is a 48year old female. This 42-year-old female presents approximately 3 weeks post right forefoot surgery. Patient is getting along pretty well continues to wear the surgical shoe as directed.       ROS:              C

## 2018-12-12 NOTE — TELEPHONE ENCOUNTER
SCR see below messages - pt is coming in today for 3wk PO visit and would like a letter to continue to be off work due to pain and swelling. Your last letter gave her 3 wks off which expires today.  You can wait to discuss with pt when she comes in at 220pm

## 2018-12-14 RX ORDER — VALACYCLOVIR HYDROCHLORIDE 1 G/1
1 TABLET, FILM COATED ORAL DAILY
Qty: 90 TABLET | Refills: 4 | Status: SHIPPED | OUTPATIENT
Start: 2018-12-14 | End: 2020-02-18

## 2018-12-26 ENCOUNTER — OFFICE VISIT (OUTPATIENT)
Dept: PODIATRY CLINIC | Facility: CLINIC | Age: 53
End: 2018-12-26
Payer: COMMERCIAL

## 2018-12-26 DIAGNOSIS — M21.611 BUNION OF GREAT TOE OF RIGHT FOOT: Primary | ICD-10-CM

## 2018-12-26 DIAGNOSIS — M20.41 HAMMER TOE OF RIGHT FOOT: ICD-10-CM

## 2018-12-26 PROCEDURE — 99024 POSTOP FOLLOW-UP VISIT: CPT | Performed by: PODIATRIST

## 2018-12-26 PROCEDURE — 99212 OFFICE O/P EST SF 10 MIN: CPT | Performed by: PODIATRIST

## 2018-12-26 NOTE — PROGRESS NOTES
HPI:    P this 51-year-old female presents approximately 5 weeks post right forefoot surgery. She is aware of some swelling and has some numbish tingling sensations. She does not have pain that requires medication.   Tient ID: Jacinto Johnson is a 48 year

## 2019-01-23 ENCOUNTER — OFFICE VISIT (OUTPATIENT)
Dept: PODIATRY CLINIC | Facility: CLINIC | Age: 54
End: 2019-01-23
Payer: COMMERCIAL

## 2019-01-23 DIAGNOSIS — M21.611 BUNION OF GREAT TOE OF RIGHT FOOT: Primary | ICD-10-CM

## 2019-01-23 DIAGNOSIS — M20.41 HAMMER TOE OF RIGHT FOOT: ICD-10-CM

## 2019-01-23 PROCEDURE — 99024 POSTOP FOLLOW-UP VISIT: CPT | Performed by: PODIATRIST

## 2019-01-23 PROCEDURE — 99212 OFFICE O/P EST SF 10 MIN: CPT | Performed by: PODIATRIST

## 2019-01-23 RX ORDER — ERYTHROMYCIN 5 MG/G
OINTMENT OPHTHALMIC
COMMUNITY
Start: 2018-09-22 | End: 2020-02-26

## 2019-01-23 RX ORDER — ALBUTEROL SULFATE 90 UG/1
AEROSOL, METERED RESPIRATORY (INHALATION)
COMMUNITY
Start: 2018-06-19 | End: 2020-12-17 | Stop reason: ALTCHOICE

## 2019-01-23 RX ORDER — BEPOTASTINE BESILATE 15 MG/ML
SOLUTION/ DROPS OPHTHALMIC
Refills: 1 | COMMUNITY
Start: 2018-11-06 | End: 2020-02-26

## 2019-01-23 NOTE — PROGRESS NOTES
HPI:    Patient ID: Karla Cam is a 48year old female. 51-year-old female presents 2 months post right bunionectomy and hammertoe procedure. She has no significant pain but has continued frustrations with swelling.   She is moderately active and in c

## 2019-02-13 ENCOUNTER — TELEPHONE (OUTPATIENT)
Dept: INTERNAL MEDICINE CLINIC | Facility: CLINIC | Age: 54
End: 2019-02-13

## 2019-02-13 NOTE — TELEPHONE ENCOUNTER
Es I can send it for her can you check with her whats the arthritis medicine that she needs ?   Thanks  MT

## 2019-02-13 NOTE — TELEPHONE ENCOUNTER
Pt called and is wondering if Dr Zenaida Enriquez would write her a prescription for an Arthritis medication that she was on previously? She has an appointment coming up and is wondering if the doctor would call it in ahead of time.

## 2019-02-15 ENCOUNTER — PATIENT MESSAGE (OUTPATIENT)
Dept: INTERNAL MEDICINE CLINIC | Facility: CLINIC | Age: 54
End: 2019-02-15

## 2019-02-16 RX ORDER — DICLOFENAC SODIUM 75 MG/1
75 TABLET, DELAYED RELEASE ORAL DAILY PRN
Qty: 30 TABLET | Refills: 0 | Status: SHIPPED | OUTPATIENT
Start: 2019-02-16 | End: 2019-05-23

## 2019-02-27 ENCOUNTER — OFFICE VISIT (OUTPATIENT)
Dept: INTERNAL MEDICINE CLINIC | Facility: CLINIC | Age: 54
End: 2019-02-27
Payer: COMMERCIAL

## 2019-02-27 ENCOUNTER — HOSPITAL ENCOUNTER (OUTPATIENT)
Dept: GENERAL RADIOLOGY | Facility: HOSPITAL | Age: 54
Discharge: HOME | End: 2019-02-27
Attending: PODIATRIST
Payer: COMMERCIAL

## 2019-02-27 ENCOUNTER — OFFICE VISIT (OUTPATIENT)
Dept: PODIATRY CLINIC | Facility: CLINIC | Age: 54
End: 2019-02-27
Payer: COMMERCIAL

## 2019-02-27 VITALS
BODY MASS INDEX: 45.99 KG/M2 | WEIGHT: 293 LBS | DIASTOLIC BLOOD PRESSURE: 70 MMHG | SYSTOLIC BLOOD PRESSURE: 134 MMHG | HEART RATE: 101 BPM | HEIGHT: 67 IN | OXYGEN SATURATION: 98 %

## 2019-02-27 DIAGNOSIS — K82.4 GALL BLADDER POLYP: ICD-10-CM

## 2019-02-27 DIAGNOSIS — Z47.89 ORTHOPEDIC AFTERCARE: ICD-10-CM

## 2019-02-27 DIAGNOSIS — R06.02 SOB (SHORTNESS OF BREATH): Primary | ICD-10-CM

## 2019-02-27 DIAGNOSIS — Z83.49 FAMILY HISTORY OF THYROID DISORDER: ICD-10-CM

## 2019-02-27 DIAGNOSIS — K21.9 GASTROESOPHAGEAL REFLUX DISEASE, ESOPHAGITIS PRESENCE NOT SPECIFIED: ICD-10-CM

## 2019-02-27 DIAGNOSIS — Z47.89 ORTHOPEDIC AFTERCARE: Primary | ICD-10-CM

## 2019-02-27 DIAGNOSIS — M21.611 BUNION OF GREAT TOE OF RIGHT FOOT: ICD-10-CM

## 2019-02-27 DIAGNOSIS — M20.41 HAMMER TOE OF RIGHT FOOT: ICD-10-CM

## 2019-02-27 DIAGNOSIS — E78.5 HYPERLIPIDEMIA, UNSPECIFIED HYPERLIPIDEMIA TYPE: ICD-10-CM

## 2019-02-27 DIAGNOSIS — I10 ESSENTIAL HYPERTENSION: ICD-10-CM

## 2019-02-27 PROCEDURE — 99212 OFFICE O/P EST SF 10 MIN: CPT | Performed by: PODIATRIST

## 2019-02-27 PROCEDURE — 99214 OFFICE O/P EST MOD 30 MIN: CPT | Performed by: INTERNAL MEDICINE

## 2019-02-27 PROCEDURE — 73630 X-RAY EXAM OF FOOT: CPT | Performed by: PODIATRIST

## 2019-02-27 PROCEDURE — 99024 POSTOP FOLLOW-UP VISIT: CPT | Performed by: PODIATRIST

## 2019-02-27 RX ORDER — LORATADINE 10 MG/1
10 TABLET ORAL DAILY
Qty: 30 TABLET | Refills: 1 | Status: SHIPPED | OUTPATIENT
Start: 2019-02-27 | End: 2019-03-29

## 2019-02-27 RX ORDER — OMEPRAZOLE 40 MG/1
40 CAPSULE, DELAYED RELEASE ORAL DAILY
Qty: 30 CAPSULE | Refills: 1 | Status: SHIPPED | OUTPATIENT
Start: 2019-02-27 | End: 2019-03-28

## 2019-02-27 RX ORDER — BUDESONIDE AND FORMOTEROL FUMARATE DIHYDRATE 160; 4.5 UG/1; UG/1
2 AEROSOL RESPIRATORY (INHALATION) 2 TIMES DAILY
Qty: 2 INHALER | Refills: 2 | Status: SHIPPED | OUTPATIENT
Start: 2019-02-27 | End: 2019-03-28

## 2019-02-27 RX ORDER — ALBUTEROL SULFATE 90 UG/1
2 AEROSOL, METERED RESPIRATORY (INHALATION) EVERY 6 HOURS PRN
Qty: 1 INHALER | Refills: 2 | Status: SHIPPED | OUTPATIENT
Start: 2019-02-27 | End: 2019-10-29

## 2019-02-27 NOTE — PROGRESS NOTES
HPI:    Patient ID: Angelica Skelton is a 47year old female. This 51-year-old female presents postsurgical at approximately 3 months. She states during the past week she has had an increase in pain on the bottom of the great toe.   She states that she wa region. I am not able to palpate anything unusual in the area. X-ray shows appropriate alignment and progressive healing is occurring.   I am not exactly certain what is causing this patient's complaint P the.  I discussed the likelihood of sesamoiditis b

## 2019-02-27 NOTE — PATIENT INSTRUCTIONS
Priscila Barajas    Welcome to [facility]. We are excited that you are committed to improving your health and have invited our practice to be part of your journey.  Our approach to the medical management of weight loss is similar to that of other c of water per day, add fiber ( benefiber) to the water to increase fullness, overcome constipation    · Eat slowly    · Do not drink your calories ( no regular pop, juice, high calorie coffee drinks, limit alcohol) Also stay away from artificially sweetened

## 2019-02-27 NOTE — PROGRESS NOTES
Sadie Kern is a 47year old female.     Chief complaint: sob    HPI:     47year old female with PMH as listed below here for   SOB   Patient reports sob has been going on for the last year   Had multiple work up including CT with contrast , cardiac cat Esophageal reflux 2005   • Herpes    • Hyperlipidemia 1995   • Morbid obesity [facility])      Past Surgical History:   Procedure Laterality Date   • COLONOSCOPY  2014   • ENDOSCOPY, BOWEL POUCH, BIOPSY  10/03/2018   • KNEE SURGERY  2012        Social History: Base) MCG/ACT Inhalation Aero Soln; Inhale 2 puffs into the lungs every 6 (six) hours as needed for Wheezing or Shortness of Breath. Dispense: 1 Inhaler; Refill: 2  - PULMONARY - INTERNAL  - GASTRO - INTERNAL  - loratadine 10 MG Oral Tab;  Take 1 tablet (1 alcohol)  - Do not eat late at night  - Exercise- at least 3 times per week ( goal is 150 min/week)  - Omeprazole 40 MG Oral Capsule Delayed Release; Take 1 capsule (40 mg total) by mouth daily. Dispense: 30 capsule;  Refill: 1  - Albuterol Sulfate  capsule (40 mg total) by mouth daily. Dispense: 30 capsule; Refill: 1  - Albuterol Sulfate  (90 Base) MCG/ACT Inhalation Aero Soln; Inhale 2 puffs into the lungs every 6 (six) hours as needed for Wheezing or Shortness of Breath.   Dispense: 1 Inhale

## 2019-02-28 PROBLEM — K82.4 GALL BLADDER POLYP: Status: ACTIVE | Noted: 2019-02-28

## 2019-02-28 PROBLEM — Z83.49 FAMILY HISTORY OF THYROID DISORDER: Status: ACTIVE | Noted: 2019-02-28

## 2019-03-05 RX ORDER — AMOXICILLIN AND CLAVULANATE POTASSIUM 875; 125 MG/1; MG/1
1 TABLET, FILM COATED ORAL 2 TIMES DAILY
Qty: 20 TABLET | Refills: 0 | Status: SHIPPED | OUTPATIENT
Start: 2019-03-05 | End: 2019-03-15

## 2019-03-13 ENCOUNTER — OFFICE VISIT (OUTPATIENT)
Dept: PODIATRY CLINIC | Facility: CLINIC | Age: 54
End: 2019-03-13
Payer: COMMERCIAL

## 2019-03-13 DIAGNOSIS — M21.611 BUNION OF GREAT TOE OF RIGHT FOOT: Primary | ICD-10-CM

## 2019-03-13 DIAGNOSIS — M20.41 HAMMER TOE OF RIGHT FOOT: ICD-10-CM

## 2019-03-13 PROCEDURE — 99024 POSTOP FOLLOW-UP VISIT: CPT | Performed by: PODIATRIST

## 2019-03-13 PROCEDURE — 99212 OFFICE O/P EST SF 10 MIN: CPT | Performed by: PODIATRIST

## 2019-03-13 NOTE — PROGRESS NOTES
HPI:    Patient ID: Aimee Cardoso is a 47year old female. 22-year-old female presents for postoperative follow-up in reference to the right foot.   Patient is had a dramatic improvement in fact she does not have pain sub-first metatarsal like she did on (NASONEX) 50 MCG/ACT Nasal Suspension 2 sprays by Nasal route daily. Disp: 1 Inhaler Rfl: 1   betamethasone valerate 0.1 % External Cream Apply 1 Application topically 2 (two) times daily.  Disp: 45 g Rfl: 0   Red Yeast Rice Extract (RED YEAST RICE OR) Take

## 2019-03-22 ENCOUNTER — TELEPHONE (OUTPATIENT)
Dept: INTERNAL MEDICINE CLINIC | Facility: CLINIC | Age: 54
End: 2019-03-22

## 2019-03-22 RX ORDER — FLUCONAZOLE 150 MG/1
150 TABLET ORAL ONCE
Qty: 1 TABLET | Refills: 0 | Status: SHIPPED | OUTPATIENT
Start: 2019-03-22 | End: 2019-03-22

## 2019-03-22 NOTE — TELEPHONE ENCOUNTER
SOB last night took Albuterol. Help with tightness, but cough not helped. Last year was given tessalon (had a few left, but not helping). Was considering buying Delsym. Will  Diflucan. Symbicort makes her shake for hours!   Even her voice trembl

## 2019-03-22 NOTE — TELEPHONE ENCOUNTER
Krysten, I did leave a message on your phone. This is Dr. Armand Castaneda, and I am covering for Dr. Denette Castleman.    For your cough, I would recommend taking albuterol at least 4 times daily or every 4 hours as needed, since this should also help your cough by re

## 2019-03-28 ENCOUNTER — TELEPHONE (OUTPATIENT)
Dept: INTERNAL MEDICINE CLINIC | Facility: CLINIC | Age: 54
End: 2019-03-28

## 2019-03-28 ENCOUNTER — PATIENT MESSAGE (OUTPATIENT)
Dept: INTERNAL MEDICINE CLINIC | Facility: CLINIC | Age: 54
End: 2019-03-28

## 2019-03-28 DIAGNOSIS — R06.02 SOB (SHORTNESS OF BREATH): ICD-10-CM

## 2019-03-28 DIAGNOSIS — E78.5 HYPERLIPIDEMIA, UNSPECIFIED HYPERLIPIDEMIA TYPE: ICD-10-CM

## 2019-03-28 DIAGNOSIS — K82.4 GALL BLADDER POLYP: ICD-10-CM

## 2019-03-28 DIAGNOSIS — K21.9 GASTROESOPHAGEAL REFLUX DISEASE, ESOPHAGITIS PRESENCE NOT SPECIFIED: ICD-10-CM

## 2019-03-28 DIAGNOSIS — M25.50 POLYARTHRALGIA: Primary | ICD-10-CM

## 2019-03-28 DIAGNOSIS — M62.89 MUSCLE STIFFNESS: ICD-10-CM

## 2019-03-28 DIAGNOSIS — Z83.49 FAMILY HISTORY OF THYROID DISORDER: ICD-10-CM

## 2019-03-28 RX ORDER — OMEPRAZOLE 40 MG/1
40 CAPSULE, DELAYED RELEASE ORAL DAILY
Qty: 90 CAPSULE | Refills: 0 | Status: SHIPPED | OUTPATIENT
Start: 2019-03-28 | End: 2019-07-09

## 2019-03-28 RX ORDER — BUDESONIDE AND FORMOTEROL FUMARATE DIHYDRATE 160; 4.5 UG/1; UG/1
2 AEROSOL RESPIRATORY (INHALATION) 2 TIMES DAILY
Qty: 2 INHALER | Refills: 2 | Status: SHIPPED | OUTPATIENT
Start: 2019-03-28 | End: 2019-06-26

## 2019-04-01 ENCOUNTER — TELEPHONE (OUTPATIENT)
Dept: CARDIOLOGY | Age: 54
End: 2019-04-01

## 2019-04-06 ENCOUNTER — NURSE ONLY (OUTPATIENT)
Dept: INTERNAL MEDICINE CLINIC | Facility: CLINIC | Age: 54
End: 2019-04-06
Payer: COMMERCIAL

## 2019-04-06 DIAGNOSIS — M25.50 POLYARTHRALGIA: ICD-10-CM

## 2019-04-06 DIAGNOSIS — M62.89 MUSCLE STIFFNESS: ICD-10-CM

## 2019-04-06 DIAGNOSIS — I10 ESSENTIAL HYPERTENSION: ICD-10-CM

## 2019-04-06 PROCEDURE — 86140 C-REACTIVE PROTEIN: CPT | Performed by: INTERNAL MEDICINE

## 2019-04-06 PROCEDURE — 36415 COLL VENOUS BLD VENIPUNCTURE: CPT | Performed by: INTERNAL MEDICINE

## 2019-04-06 PROCEDURE — 85652 RBC SED RATE AUTOMATED: CPT | Performed by: INTERNAL MEDICINE

## 2019-04-06 PROCEDURE — 83036 HEMOGLOBIN GLYCOSYLATED A1C: CPT | Performed by: INTERNAL MEDICINE

## 2019-04-06 PROCEDURE — 84439 ASSAY OF FREE THYROXINE: CPT | Performed by: INTERNAL MEDICINE

## 2019-04-06 PROCEDURE — 85025 COMPLETE CBC W/AUTO DIFF WBC: CPT | Performed by: INTERNAL MEDICINE

## 2019-04-06 PROCEDURE — 80061 LIPID PANEL: CPT | Performed by: INTERNAL MEDICINE

## 2019-04-06 PROCEDURE — 86431 RHEUMATOID FACTOR QUANT: CPT | Performed by: INTERNAL MEDICINE

## 2019-04-06 PROCEDURE — 84443 ASSAY THYROID STIM HORMONE: CPT | Performed by: INTERNAL MEDICINE

## 2019-04-06 PROCEDURE — 80053 COMPREHEN METABOLIC PANEL: CPT | Performed by: INTERNAL MEDICINE

## 2019-04-06 PROCEDURE — 86038 ANTINUCLEAR ANTIBODIES: CPT | Performed by: INTERNAL MEDICINE

## 2019-04-07 DIAGNOSIS — I10 ESSENTIAL HYPERTENSION: Primary | ICD-10-CM

## 2019-04-09 ENCOUNTER — TELEPHONE (OUTPATIENT)
Dept: INTERNAL MEDICINE CLINIC | Facility: CLINIC | Age: 54
End: 2019-04-09

## 2019-04-09 DIAGNOSIS — M25.50 POLYARTHRALGIA: ICD-10-CM

## 2019-04-09 DIAGNOSIS — R70.0 ESR RAISED: Primary | ICD-10-CM

## 2019-04-09 NOTE — TELEPHONE ENCOUNTER
PT's insurance is insisting on a referral before she can make an appt  [facility]  Fax [phone]

## 2019-04-10 ENCOUNTER — OFFICE VISIT (OUTPATIENT)
Dept: ENDOCRINOLOGY CLINIC | Facility: CLINIC | Age: 54
End: 2019-04-10
Payer: COMMERCIAL

## 2019-04-10 ENCOUNTER — TELEPHONE (OUTPATIENT)
Dept: INTERNAL MEDICINE CLINIC | Facility: CLINIC | Age: 54
End: 2019-04-10

## 2019-04-10 VITALS
WEIGHT: 293 LBS | SYSTOLIC BLOOD PRESSURE: 129 MMHG | DIASTOLIC BLOOD PRESSURE: 80 MMHG | BODY MASS INDEX: 45.99 KG/M2 | HEART RATE: 103 BPM | HEIGHT: 67 IN

## 2019-04-10 DIAGNOSIS — R22.1 NECK FULLNESS: ICD-10-CM

## 2019-04-10 DIAGNOSIS — E05.90 HYPERTHYROIDISM: Primary | ICD-10-CM

## 2019-04-10 DIAGNOSIS — E04.1 THYROID NODULE: ICD-10-CM

## 2019-04-10 PROCEDURE — 99212 OFFICE O/P EST SF 10 MIN: CPT | Performed by: INTERNAL MEDICINE

## 2019-04-10 PROCEDURE — 99243 OFF/OP CNSLTJ NEW/EST LOW 30: CPT | Performed by: INTERNAL MEDICINE

## 2019-04-10 RX ORDER — ATENOLOL 25 MG/1
25 TABLET ORAL DAILY
Qty: 90 TABLET | Refills: 0 | Status: SHIPPED | OUTPATIENT
Start: 2019-04-10 | End: 2019-05-02

## 2019-04-10 NOTE — H&P
New Patient Evaluation - History and Physical    CONSULT - Reason for Visit:  Hyperthyroidism  Requesting Physician: Dr. Aniyah Amato:    Hyperthyroidism     HISTORY OF PRESENT ILLNESS:   Liyah Colvin is a 47year old female who presents to HFA) 108 (90 Base) MCG/ACT Inhalation Aero Soln Inhale into the lungs. Disp:  Rfl:    ValACYclovir HCl 1 G Oral Tab Take 1 tablet (1,000 mg total) by mouth daily.  Disp: 90 tablet Rfl: 4   Triamterene-HCTZ 37.5-25 MG Oral Cap Take 1 capsule by mouth once da Negative for:  abdominal pain, nausea, vomiting, diarrhea, constipation, bleeding  Neurology: Negative for: headache, numbness, weakness  Genito-Urinary: Negative for: dysuria, frequency  Psychiatric: Negative for:  depression, anxiety  Hematology/Lymphati

## 2019-04-10 NOTE — TELEPHONE ENCOUNTER
Patient states inhalers don't seem to be working. Causing throat pain. Was given a nebulizer in the hospital and would like to know if she can get that instead. She is having severe SOB. no

## 2019-04-10 NOTE — PROGRESS NOTES
[facility] - Gastroenterology                                                                                                  Clinic Progress Note    Patient pr ENDOSCOPY, BOWEL POUCH, BIOPSY  10/03/2018   • KNEE SURGERY  2012      Family Hx:   Family History   Problem Relation Age of Onset   • Colon Polyps Father    • Diabetes Father    • Heart Disease Mother       Social History: Social History    Tobacco Use were reviewed and were negative except as noted in the HPI    PHYSICAL EXAM:   Blood pressure 125/74, pulse 64, height 5' 7\" (1.702 m), weight (!) 308 lb (139.7 kg).     General:awake, cooperative, no acute distress  HEENT: EOMI, no scleral icterus, MMM; o symptoms suggestive of bleeding, it seems unlikely that her inflammatory markers or mild anemia is related to her GI tract especially as she had an EGD in October which was noted as unremarkable; however, given her prior tubular adenoma in 2014, will plan

## 2019-04-11 ENCOUNTER — OFFICE VISIT (OUTPATIENT)
Dept: GASTROENTEROLOGY | Facility: CLINIC | Age: 54
End: 2019-04-11
Payer: COMMERCIAL

## 2019-04-11 ENCOUNTER — TELEPHONE (OUTPATIENT)
Dept: GASTROENTEROLOGY | Facility: CLINIC | Age: 54
End: 2019-04-11

## 2019-04-11 ENCOUNTER — NURSE ONLY (OUTPATIENT)
Dept: INTERNAL MEDICINE CLINIC | Facility: CLINIC | Age: 54
End: 2019-04-11
Payer: COMMERCIAL

## 2019-04-11 VITALS
SYSTOLIC BLOOD PRESSURE: 125 MMHG | WEIGHT: 293 LBS | HEART RATE: 64 BPM | BODY MASS INDEX: 45.99 KG/M2 | DIASTOLIC BLOOD PRESSURE: 74 MMHG | HEIGHT: 67 IN

## 2019-04-11 DIAGNOSIS — R79.82 CRP ELEVATED: ICD-10-CM

## 2019-04-11 DIAGNOSIS — I10 ESSENTIAL HYPERTENSION: ICD-10-CM

## 2019-04-11 DIAGNOSIS — Z86.010 HISTORY OF ADENOMATOUS POLYP OF COLON: ICD-10-CM

## 2019-04-11 DIAGNOSIS — D64.9 ANEMIA, UNSPECIFIED TYPE: Primary | ICD-10-CM

## 2019-04-11 DIAGNOSIS — K82.4 GALLBLADDER POLYP: ICD-10-CM

## 2019-04-11 DIAGNOSIS — D64.9 ANEMIA, UNSPECIFIED TYPE: ICD-10-CM

## 2019-04-11 DIAGNOSIS — Z86.010 HX OF ADENOMATOUS POLYP OF COLON: ICD-10-CM

## 2019-04-11 PROCEDURE — 99214 OFFICE O/P EST MOD 30 MIN: CPT | Performed by: INTERNAL MEDICINE

## 2019-04-11 PROCEDURE — 82550 ASSAY OF CK (CPK): CPT | Performed by: INTERNAL MEDICINE

## 2019-04-11 PROCEDURE — 82746 ASSAY OF FOLIC ACID SERUM: CPT | Performed by: INTERNAL MEDICINE

## 2019-04-11 PROCEDURE — 82607 VITAMIN B-12: CPT | Performed by: INTERNAL MEDICINE

## 2019-04-11 PROCEDURE — 86376 MICROSOMAL ANTIBODY EACH: CPT | Performed by: INTERNAL MEDICINE

## 2019-04-11 PROCEDURE — 83540 ASSAY OF IRON: CPT | Performed by: INTERNAL MEDICINE

## 2019-04-11 PROCEDURE — 84466 ASSAY OF TRANSFERRIN: CPT | Performed by: INTERNAL MEDICINE

## 2019-04-11 PROCEDURE — 82728 ASSAY OF FERRITIN: CPT | Performed by: INTERNAL MEDICINE

## 2019-04-11 PROCEDURE — 36415 COLL VENOUS BLD VENIPUNCTURE: CPT | Performed by: INTERNAL MEDICINE

## 2019-04-11 RX ORDER — POLYETHYLENE GLYCOL 3350, SODIUM CHLORIDE, SODIUM BICARBONATE, POTASSIUM CHLORIDE 420; 11.2; 5.72; 1.48 G/4L; G/4L; G/4L; G/4L
POWDER, FOR SOLUTION ORAL
Qty: 1 BOTTLE | Refills: 0 | Status: ON HOLD | OUTPATIENT
Start: 2019-04-11 | End: 2019-12-02

## 2019-04-11 RX ORDER — NAPROXEN 500 MG/1
1 TABLET ORAL
COMMUNITY
End: 2019-11-13 | Stop reason: ALTCHOICE

## 2019-04-11 RX ORDER — IBUPROFEN 800 MG/1
800 TABLET ORAL
Status: ON HOLD | COMMUNITY
Start: 2012-09-16 | End: 2020-07-24

## 2019-04-11 RX ORDER — HYDROCHLOROTHIAZIDE 12.5 MG/1
CAPSULE, GELATIN COATED ORAL
COMMUNITY
End: 2019-11-13 | Stop reason: CLARIF

## 2019-04-11 NOTE — PROGRESS NOTES
Confirmed  & full name, Pt was drawn today for FOLIC ACID, VITAMIN I69, IRON, FERRITIN, THYROID, CK CREATINE. Pt tolerated blood draw well.

## 2019-04-11 NOTE — TELEPHONE ENCOUNTER
JOSE G RNs    [facility] is scheduled at [address] [place] [address] because of BMI she can only go there. She will need a PA can you please obtain.  Thank you

## 2019-04-11 NOTE — PATIENT INSTRUCTIONS
1. Schedule colonoscopy with monitored anesthesia care (MAC) with Dr. Cony Hunt    2.  bowel prep from pharmacy ([facility] )    3. Continue all medications for procedure    4. Read all bowel prep instructions carefully    5.  AVOID seeds, nuts, p

## 2019-04-11 NOTE — TELEPHONE ENCOUNTER
Spoke to Mellisa at [facility] ([phone]) provided CPT/DX code, MD NPI, [facility] NPI, DOS. States case is pending clinicals, case number:[id number]. Clinicals faxed to [phone], fax confirmation received 4/11/19 @ 21 [phone].   Call reference number:[id number].    -Awaiting in

## 2019-04-11 NOTE — TELEPHONE ENCOUNTER
Scheduled for:  Colonoscopy 63982  Provider Name:   Date:  7/19/19  Location:  [facility]  Sedation:  MAC  Time:  815am pt told to arrive at 715am  Prep:trilyte  Meds/Allergies Reconciled?:  Physician reviewed  Diagnosis with codes:  Anemia D64.9;  Hx of colo

## 2019-04-16 NOTE — TELEPHONE ENCOUNTER
Spoke to Magdalena at [facility] ([phone]) and he states the case has been approved with authorization number [id number], call reference number:[id number]. Referral updated.

## 2019-04-16 NOTE — TELEPHONE ENCOUNTER
Left message to call back?  How she is doing? - inform her Dr. Julieta River needs to speak to [facility] Dr. Bernestine Moritz

## 2019-04-23 ENCOUNTER — TELEPHONE (OUTPATIENT)
Dept: ENDOCRINOLOGY CLINIC | Facility: CLINIC | Age: 54
End: 2019-04-23

## 2019-04-23 NOTE — TELEPHONE ENCOUNTER
Pt requesting orders to be sent via [employer]. For add'l questions, pls call pt. Thank you.

## 2019-04-23 NOTE — TELEPHONE ENCOUNTER
Pt checking status on message. Pt requesting orders to be faxed to [facility] at [phone] attn: Scott Gonzalez. For add'l questions pls call. Thank you.

## 2019-04-24 NOTE — TELEPHONE ENCOUNTER
Pt states Jain from [facility] did not receive fax. Requesting RN to refax orders to [phone] attn: Norah Sosa. Pls call pt once this has been faxed. Thank you.

## 2019-04-24 NOTE — TELEPHONE ENCOUNTER
Order has been refaxed. Contacted patient and let her know. Also PA completed for NM scan and approved from 4/24/19-6/8/2019. Approval number [id number]. Contacted patient and made her aware and also faxed approval info to [facility].

## 2019-04-25 ENCOUNTER — PATIENT MESSAGE (OUTPATIENT)
Dept: ENDOCRINOLOGY CLINIC | Facility: CLINIC | Age: 54
End: 2019-04-25

## 2019-04-25 DIAGNOSIS — E03.9 HYPOTHYROIDISM, UNSPECIFIED TYPE: Primary | ICD-10-CM

## 2019-04-25 RX ORDER — ALBUTEROL SULFATE 2.5 MG/3ML
2.5 SOLUTION RESPIRATORY (INHALATION) EVERY 6 HOURS PRN
Qty: 1 BOTTLE | Refills: 5 | Status: SHIPPED | OUTPATIENT
Start: 2019-04-25 | End: 2019-10-29

## 2019-04-25 NOTE — TELEPHONE ENCOUNTER
Pt was scheduled to have a NM thyroid scan this morning but cx due to needing to pay almost $1k as her pt portion. Would like to know if it is absolutely necessary? States she has the [address] scheduled for next week.

## 2019-04-25 NOTE — TELEPHONE ENCOUNTER
From: Contreras Mcmahon  To: Juan Miguel Winn MD  Sent: 4/25/2019 8:26 AM CDT  Subject: Test Results Question    Dr Sharmila Carrillo,    I did not take the nuclear test today as my patient portion will be almost 1,000. I am due to have the ultrasound next week.  Is

## 2019-04-26 NOTE — TELEPHONE ENCOUNTER
I am sorry that this is the case.    Let us do a thyroid US in that case  Not the best test, but will give us some information  Also, she should repeat her labs  TSH, Free T4 and Free T3  Thanks

## 2019-05-02 ENCOUNTER — PATIENT MESSAGE (OUTPATIENT)
Dept: ENDOCRINOLOGY CLINIC | Facility: CLINIC | Age: 54
End: 2019-05-02

## 2019-05-02 RX ORDER — PROPRANOLOL HYDROCHLORIDE 20 MG/1
20 TABLET ORAL DAILY
Qty: 30 TABLET | Refills: 0 | Status: SHIPPED | OUTPATIENT
Start: 2019-05-02 | End: 2019-05-26

## 2019-05-02 NOTE — TELEPHONE ENCOUNTER
Sent message back to patient advising of change in medication below (per patient prefers [employer])

## 2019-05-02 NOTE — TELEPHONE ENCOUNTER
Lets try propranolol which will have better peripheral coverage for tremor. Start 20mg PO BID, #60 and make sure she is drinking plenty of water with medication. Thanks.

## 2019-05-02 NOTE — TELEPHONE ENCOUNTER
From: Contreras Mcmahon  To: Juan Miguel Winn MD  Sent: 5/2/2019 1:24 PM CDT  Subject: Non-Urgent Medical Question    Dr is there anything you can give me to stop the tremors?

## 2019-05-03 PROBLEM — R07.89 CHEST TIGHTNESS: Status: ACTIVE | Noted: 2019-05-03

## 2019-05-03 PROBLEM — E05.90 HYPERTHYROIDISM: Status: ACTIVE | Noted: 2019-05-03

## 2019-05-03 PROBLEM — Z83.49 FAMILY HISTORY OF THYROID DISORDER: Status: ACTIVE | Noted: 2019-05-03

## 2019-05-03 PROBLEM — E78.5 HYPERLIPIDEMIA: Status: ACTIVE | Noted: 2019-05-03

## 2019-05-03 PROBLEM — R73.03 PREDIABETES: Status: ACTIVE | Noted: 2019-05-03

## 2019-05-03 PROBLEM — K82.4 GALLBLADDER POLYP: Status: ACTIVE | Noted: 2019-05-03

## 2019-05-03 PROBLEM — E55.9 VITAMIN D DEFICIENCY: Status: ACTIVE | Noted: 2019-05-03

## 2019-05-03 PROBLEM — K59.00 CONSTIPATION: Status: ACTIVE | Noted: 2019-05-03

## 2019-05-03 PROBLEM — K21.9 GASTROESOPHAGEAL REFLUX DISEASE: Status: ACTIVE | Noted: 2019-05-03

## 2019-05-03 PROBLEM — I10 ESSENTIAL HYPERTENSION: Status: ACTIVE | Noted: 2019-05-03

## 2019-05-03 PROBLEM — R94.39 ABNORMAL NUCLEAR STRESS TEST: Status: ACTIVE | Noted: 2019-05-03

## 2019-05-03 PROBLEM — R14.0 BLOATING: Status: ACTIVE | Noted: 2019-05-03

## 2019-05-03 RX ORDER — ATENOLOL 25 MG/1
TABLET ORAL
Qty: 90 TABLET | Refills: 0 | OUTPATIENT
Start: 2019-05-03

## 2019-05-03 NOTE — TELEPHONE ENCOUNTER
Per [facility] encounter 5/2/19 - medication changed to propranolol. Please decline refill.

## 2019-05-06 ENCOUNTER — TELEPHONE (OUTPATIENT)
Dept: ENDOCRINOLOGY CLINIC | Facility: CLINIC | Age: 54
End: 2019-05-06

## 2019-05-06 NOTE — TELEPHONE ENCOUNTER
Pt asking for US order for lab to be faxed, pt does not see in her mychart, pt there now.   [phone] / Merlene Grande

## 2019-05-08 ENCOUNTER — APPOINTMENT (OUTPATIENT)
Dept: LAB | Age: 54
End: 2019-05-08
Attending: INTERNAL MEDICINE
Payer: COMMERCIAL

## 2019-05-08 ENCOUNTER — OFFICE VISIT (OUTPATIENT)
Dept: ENDOCRINOLOGY CLINIC | Facility: CLINIC | Age: 54
End: 2019-05-08
Payer: COMMERCIAL

## 2019-05-08 VITALS
BODY MASS INDEX: 48 KG/M2 | SYSTOLIC BLOOD PRESSURE: 131 MMHG | DIASTOLIC BLOOD PRESSURE: 85 MMHG | HEART RATE: 98 BPM | WEIGHT: 293 LBS

## 2019-05-08 DIAGNOSIS — E06.9 THYROIDITIS: Primary | ICD-10-CM

## 2019-05-08 DIAGNOSIS — E06.9 THYROIDITIS: ICD-10-CM

## 2019-05-08 PROCEDURE — 99213 OFFICE O/P EST LOW 20 MIN: CPT | Performed by: INTERNAL MEDICINE

## 2019-05-08 PROCEDURE — 99212 OFFICE O/P EST SF 10 MIN: CPT | Performed by: INTERNAL MEDICINE

## 2019-05-08 PROCEDURE — 84439 ASSAY OF FREE THYROXINE: CPT

## 2019-05-08 PROCEDURE — 84443 ASSAY THYROID STIM HORMONE: CPT

## 2019-05-08 PROCEDURE — 84481 FREE ASSAY (FT-3): CPT

## 2019-05-08 PROCEDURE — 36415 COLL VENOUS BLD VENIPUNCTURE: CPT

## 2019-05-08 NOTE — PROGRESS NOTES
Return Office Visit     CHIEF COMPLAINT:  Patient presents with: Follow - Up: Hyperthyroidism, thyroid nodule. In room 19       HISTORY OF PRESENT ILLNESS:  Marc Mejia is a 47year old female who presents for follow up for for Hyperthyroidism.    She p 160-4.5 MCG/ACT Inhalation Aerosol Inhale 2 puffs into the lungs 2 (two) times daily.  Disp: 2 Inhaler Rfl: 2   Albuterol Sulfate  (90 Base) MCG/ACT Inhalation Aero Soln Inhale 2 puffs into the lungs every 6 (six) hours as needed for Wheezing or Gabino chest pain, palpitations, orthopnea  GI: Negative for:  abdominal pain, nausea, vomiting, diarrhea, constipation, bleeding  Neurology: Negative for: headache, numbness, weakness  Genito-Urinary: Negative for: dysuria, frequency  Psychiatric: Negative for: did not have any further questions.        Orders Placed This Encounter      Assay, Thyroid Stim Hormone      Free T3 (Triiodothryronine)      Free T4, (Free Thyroxine)        5/8/2019  Meryle Ok, MD

## 2019-05-10 ENCOUNTER — PATIENT MESSAGE (OUTPATIENT)
Dept: ENDOCRINOLOGY CLINIC | Facility: CLINIC | Age: 54
End: 2019-05-10

## 2019-05-10 ENCOUNTER — TELEPHONE (OUTPATIENT)
Dept: ENDOCRINOLOGY CLINIC | Facility: CLINIC | Age: 54
End: 2019-05-10

## 2019-05-10 DIAGNOSIS — E05.90 HYPERTHYROIDISM: Primary | ICD-10-CM

## 2019-05-10 RX ORDER — METHIMAZOLE 10 MG/1
30 TABLET ORAL DAILY
Qty: 90 TABLET | Refills: 0 | Status: SHIPPED | OUTPATIENT
Start: 2019-05-10 | End: 2019-05-25

## 2019-05-10 NOTE — TELEPHONE ENCOUNTER
Spoke with the patient and discussed.    She will take the [address]  She will pick it up at the pharmacy today and start it  Thanks

## 2019-05-10 NOTE — TELEPHONE ENCOUNTER
From: Whitney Devine  To: Sandra Mayer MD  Sent: 5/10/2019 1:55 PM CDT  Subject: Test Results Question    Please place my thyroid blood work results in my chart.  Thanks

## 2019-05-10 NOTE — TELEPHONE ENCOUNTER
Pt notified. Medication side effects discussed. Consequences of untreated hyperthyroidism discussed. Pt is leery of starting the medication and would like to do some of her own research before starting.   She will notify the clinic if she has any questio

## 2019-05-10 NOTE — TELEPHONE ENCOUNTER
Patient has hyperthyroidism.    Could not do a NM thyroid scan and uptake due to cost issues  Thyroid US consistent with inflammation : thyroiditis  We repeated labs and she is still very hyperthyroid  Suggest that she start MMI 30 mg daily  Also take propr

## 2019-05-13 ENCOUNTER — PATIENT MESSAGE (OUTPATIENT)
Dept: ENDOCRINOLOGY CLINIC | Facility: CLINIC | Age: 54
End: 2019-05-13

## 2019-05-13 NOTE — TELEPHONE ENCOUNTER
From: Ozzie Nguyen  To: Red Siegel MD  Sent: 5/13/2019 2:43 PM CDT  Subject: Elizabeth Mantilla Dr.     Is it ok to take the Propanol and the methimazole? The combo eliminates the tremors 100% rather than 75% with with Methimazole.

## 2019-05-14 NOTE — TELEPHONE ENCOUNTER
OK to write letter as requested?  Please see MC from pt Alert and oriented to person, place and time

## 2019-05-23 RX ORDER — DICLOFENAC SODIUM 75 MG/1
75 TABLET, DELAYED RELEASE ORAL DAILY PRN
Qty: 30 TABLET | Refills: 0 | Status: SHIPPED | OUTPATIENT
Start: 2019-05-23 | End: 2019-06-22

## 2019-05-24 ENCOUNTER — APPOINTMENT (OUTPATIENT)
Dept: LAB | Age: 54
End: 2019-05-24
Attending: INTERNAL MEDICINE
Payer: COMMERCIAL

## 2019-05-24 DIAGNOSIS — E03.9 HYPOTHYROIDISM, UNSPECIFIED TYPE: ICD-10-CM

## 2019-05-24 DIAGNOSIS — E05.90 HYPERTHYROIDISM: ICD-10-CM

## 2019-05-24 PROCEDURE — 36415 COLL VENOUS BLD VENIPUNCTURE: CPT

## 2019-05-24 PROCEDURE — 84439 ASSAY OF FREE THYROXINE: CPT

## 2019-05-24 PROCEDURE — 84443 ASSAY THYROID STIM HORMONE: CPT

## 2019-05-24 PROCEDURE — 84481 FREE ASSAY (FT-3): CPT

## 2019-05-25 ENCOUNTER — PATIENT MESSAGE (OUTPATIENT)
Dept: ENDOCRINOLOGY CLINIC | Facility: CLINIC | Age: 54
End: 2019-05-25

## 2019-05-25 DIAGNOSIS — E05.90 HYPERTHYROIDISM: Primary | ICD-10-CM

## 2019-05-28 RX ORDER — METHIMAZOLE 10 MG/1
20 TABLET ORAL DAILY
Qty: 90 TABLET | Refills: 0 | Status: SHIPPED | OUTPATIENT
Start: 2019-05-28 | End: 2019-06-03

## 2019-05-28 RX ORDER — PROPRANOLOL HYDROCHLORIDE 20 MG/1
TABLET ORAL
Qty: 30 TABLET | Refills: 2 | Status: SHIPPED | OUTPATIENT
Start: 2019-05-28 | End: 2020-02-26

## 2019-05-28 NOTE — TELEPHONE ENCOUNTER
LOV 5/08/19. Not specified when to rtc. Pt email 5/25/19. Methimazole was decreased to 20mg daily. Pended 3 month supply for provider.

## 2019-06-03 RX ORDER — METHIMAZOLE 10 MG/1
TABLET ORAL
Qty: 90 TABLET | Refills: 0 | Status: SHIPPED | OUTPATIENT
Start: 2019-06-03 | End: 2019-07-03

## 2019-06-08 ENCOUNTER — PATIENT MESSAGE (OUTPATIENT)
Dept: ENDOCRINOLOGY CLINIC | Facility: CLINIC | Age: 54
End: 2019-06-08

## 2019-06-10 NOTE — TELEPHONE ENCOUNTER
From: Contreras Mcmahon  To: Juan Miguel Winn MD  Sent: 6/8/2019 9:55 AM CDT  Subject: Visit Follow-up Question    Dr Sharmila Carrillo    I no longer have health insurance and my blood work and visits will have to be on a cash basis.  Can you mail me the order for m

## 2019-06-22 RX ORDER — DICLOFENAC SODIUM 75 MG/1
TABLET, DELAYED RELEASE ORAL
Qty: 30 TABLET | Refills: 0 | Status: SHIPPED | OUTPATIENT
Start: 2019-06-22 | End: 2019-10-29

## 2019-06-26 ENCOUNTER — PATIENT MESSAGE (OUTPATIENT)
Dept: ENDOCRINOLOGY CLINIC | Facility: CLINIC | Age: 54
End: 2019-06-26

## 2019-06-26 DIAGNOSIS — E05.90 HYPERTHYROIDISM: Primary | ICD-10-CM

## 2019-06-27 NOTE — TELEPHONE ENCOUNTER
From: Katie Ayoub  To: Anitra Cheadle, MD  Sent: 6/26/2019 9:37 PM CDT  Subject: Test Results Question    Labs were done at [address] on 6/25 and will be faxed to you. Please let me know when you receive them.  Thanks

## 2019-06-28 ENCOUNTER — PATIENT MESSAGE (OUTPATIENT)
Dept: ENDOCRINOLOGY CLINIC | Facility: CLINIC | Age: 54
End: 2019-06-28

## 2019-06-28 NOTE — TELEPHONE ENCOUNTER
Can you please call her to see if she has any other questions  Do not have labs yet  Can we call lab khadar?Thanks

## 2019-06-28 NOTE — TELEPHONE ENCOUNTER
Called pt, states she got them done at [facility] on [address] in [facility]. Esteban, spoke with Nicolás Cyr. States labs will be faxed over shortly. Pt wants to speak directly to AM as she has questions.

## 2019-06-28 NOTE — TELEPHONE ENCOUNTER
Thyroid labs are much better  She is on MMI 30 mg daily  Decrease to 20 mg daily  TSH, Free t4 and Free T4 in 6 weeks    She can let the staff know questions and I a happy to answer  She can also send me a my chart  Thanks

## 2019-07-01 NOTE — TELEPHONE ENCOUNTER
From: Kavitha Calvo  To: Cecile Marion MD  Sent: 6/28/2019 7:45 PM CDT  Subject: Other    Dr Elvin Gaxiola asked to speak personally with you 3 times and your staff ignores my request. I would appreciate a call   If you charge let me know.  The Dosa

## 2019-07-03 RX ORDER — METHIMAZOLE 10 MG/1
TABLET ORAL
Qty: 90 TABLET | Refills: 0 | Status: SHIPPED | OUTPATIENT
Start: 2019-07-03 | End: 2020-03-12

## 2019-07-03 RX ORDER — METHIMAZOLE 10 MG/1
10 TABLET ORAL DAILY
Qty: 90 TABLET | Refills: 0 | COMMUNITY
Start: 2019-07-03 | End: 2019-10-29

## 2019-07-03 NOTE — TELEPHONE ENCOUNTER
Guy Lua again to follow up on symptoms. Denies having a rash, but states she has experienced a few bruises on her arm (unknown cause) that go away quickly. No pattern.  Did advise this is unlikely related to [address] and that rash would be expected SE. Rec

## 2019-07-03 NOTE — TELEPHONE ENCOUNTER
Called Krysten. She confirms she has been taking MMI 20 mg po daily for the past 6 weeks or so, is agreeable to decreasing to MMI 10 mg po daily now. She understands she should repeat labs in another 6 weeks. Orders are placed.  She questioned why her TSH ha

## 2019-07-08 ENCOUNTER — PATIENT MESSAGE (OUTPATIENT)
Dept: INTERNAL MEDICINE CLINIC | Facility: CLINIC | Age: 54
End: 2019-07-08

## 2019-07-08 DIAGNOSIS — K82.4 GALL BLADDER POLYP: ICD-10-CM

## 2019-07-08 DIAGNOSIS — R06.02 SOB (SHORTNESS OF BREATH): ICD-10-CM

## 2019-07-08 DIAGNOSIS — K21.9 GASTROESOPHAGEAL REFLUX DISEASE, ESOPHAGITIS PRESENCE NOT SPECIFIED: ICD-10-CM

## 2019-07-08 DIAGNOSIS — Z83.49 FAMILY HISTORY OF THYROID DISORDER: ICD-10-CM

## 2019-07-08 DIAGNOSIS — E78.5 HYPERLIPIDEMIA, UNSPECIFIED HYPERLIPIDEMIA TYPE: ICD-10-CM

## 2019-07-08 RX ORDER — TRIAMTERENE AND HYDROCHLOROTHIAZIDE 37.5; 25 MG/1; MG/1
CAPSULE ORAL
Qty: 90 CAPSULE | Refills: 1 | Status: SHIPPED | OUTPATIENT
Start: 2019-07-08 | End: 2020-02-18

## 2019-07-09 RX ORDER — OMEPRAZOLE 40 MG/1
40 CAPSULE, DELAYED RELEASE ORAL DAILY
Qty: 90 CAPSULE | Refills: 1 | Status: SHIPPED | OUTPATIENT
Start: 2019-07-09 | End: 2019-10-07

## 2019-07-10 NOTE — TELEPHONE ENCOUNTER
Clair Ramos 7/9/2019 7:01 AM CDT        ----- Message -----  From: Reyna Bailon  Sent: 7/8/2019 10:16 PM  To: [facility] Clinical Staff  Subject: Prescription Question     I dont see the Omeprazole listed in my chart and I need a refill please.  Thank you

## 2019-07-11 ENCOUNTER — TELEPHONE (OUTPATIENT)
Dept: GASTROENTEROLOGY | Facility: CLINIC | Age: 54
End: 2019-07-11

## 2019-07-11 DIAGNOSIS — D64.9 ANEMIA, UNSPECIFIED TYPE: Primary | ICD-10-CM

## 2019-07-11 DIAGNOSIS — Z86.010 HISTORY OF COLON POLYPS: ICD-10-CM

## 2019-07-11 NOTE — TELEPHONE ENCOUNTER
Cancel for:  Colonoscopy 72660  Provider Name: Dr. Mary Loomis  Date:  7/19/19  Location:  [facility]  Sedation:  MAC  Time:   0653   Prep:  Taylor Loyola  Meds/Allergies Reconciled?:  Physician reviewed   Diagnosis with codes:  Anemia D64.9, History of colon polyps Z86.

## 2019-08-15 ENCOUNTER — PATIENT MESSAGE (OUTPATIENT)
Dept: ENDOCRINOLOGY CLINIC | Facility: CLINIC | Age: 54
End: 2019-08-15

## 2019-08-15 DIAGNOSIS — E06.9 THYROIDITIS: Primary | ICD-10-CM

## 2019-08-15 NOTE — TELEPHONE ENCOUNTER
From: Reta Don  To: Janet Ortiz MD  Sent: 8/15/2019 12:07 PM CDT  Subject: Test Results Question    Hello    I had labs drawn on 8/9 and my test results are not in My Chart. Are they back? If so, please call me to discuss.      Thank you,  Sally

## 2019-08-15 NOTE — TELEPHONE ENCOUNTER
Pt had labs done 8/9 - I do not see that we have rec'd them. RN responded to pt in [facility] to please have them re-faxed to us.     Forwarded to AM - please advise

## 2019-08-16 NOTE — TELEPHONE ENCOUNTER
C/o extreme fatigue, weight gain. Pt very concerned and upset because we have not rec'd her lab results from 8/9 and it is 8/16. Pt confirms she indeed had her labs drawn at the [facility] laboratory.  RN explained we are doing

## 2019-08-16 NOTE — TELEPHONE ENCOUNTER
I am sorry about the confusion about the labs. We will take it up with the concerned people to make sure this does not repeat. Her labs indicate that her TSH is high. Hence, please stop MMI. Repeat labs TSH, Free T4, Free T3 in two  weeks.    Also,

## 2019-08-16 NOTE — TELEPHONE ENCOUNTER
I do not see any labs in process.    Also, once we get the labs, she also needs an apt for lab review  Okay to overbook if needed per patient's convenience   Thanks

## 2019-08-16 NOTE — TELEPHONE ENCOUNTER
Spoke w/ Sandy Held. She verbalizes understanding of stopping MMI and repeating blood work in 2 weeks. Booked fu appointment 9/3. Lab orders placed.  Discussed that we are working with clinic supervisor to remedy lab issue but also advised that she let us know

## 2019-08-20 ENCOUNTER — HOSPITAL ENCOUNTER (OUTPATIENT)
Dept: MAMMOGRAPHY | Age: 54
Discharge: HOME | End: 2019-08-20
Attending: INTERNAL MEDICINE
Payer: COMMERCIAL

## 2019-08-20 DIAGNOSIS — Z00.00 ANNUAL PHYSICAL EXAM: ICD-10-CM

## 2019-08-20 PROCEDURE — 77063 BREAST TOMOSYNTHESIS BI: CPT | Performed by: INTERNAL MEDICINE

## 2019-08-20 PROCEDURE — 77067 SCR MAMMO BI INCL CAD: CPT | Performed by: INTERNAL MEDICINE

## 2019-08-27 RX ORDER — METHIMAZOLE 10 MG/1
TABLET ORAL
Qty: 90 TABLET | Refills: 0 | OUTPATIENT
Start: 2019-08-27

## 2019-08-30 ENCOUNTER — APPOINTMENT (OUTPATIENT)
Dept: LAB | Facility: HOSPITAL | Age: 54
End: 2019-08-30
Attending: INTERNAL MEDICINE
Payer: COMMERCIAL

## 2019-08-30 DIAGNOSIS — E06.9 THYROIDITIS: ICD-10-CM

## 2019-08-30 DIAGNOSIS — E05.90 HYPERTHYROIDISM: ICD-10-CM

## 2019-08-30 LAB
T3FREE SERPL-MCNC: 3.89 PG/ML (ref 2.4–4.2)
T4 FREE SERPL-MCNC: 1 NG/DL (ref 0.8–1.7)
TSI SER-ACNC: 1.05 MIU/ML (ref 0.36–3.74)

## 2019-08-30 PROCEDURE — 36415 COLL VENOUS BLD VENIPUNCTURE: CPT

## 2019-08-30 PROCEDURE — 84439 ASSAY OF FREE THYROXINE: CPT

## 2019-08-30 PROCEDURE — 84443 ASSAY THYROID STIM HORMONE: CPT

## 2019-08-30 PROCEDURE — 84481 FREE ASSAY (FT-3): CPT

## 2019-09-03 ENCOUNTER — APPOINTMENT (OUTPATIENT)
Dept: LAB | Facility: REFERENCE LAB | Age: 54
End: 2019-09-03
Attending: INTERNAL MEDICINE
Payer: COMMERCIAL

## 2019-09-03 ENCOUNTER — OFFICE VISIT (OUTPATIENT)
Dept: ENDOCRINOLOGY CLINIC | Facility: CLINIC | Age: 54
End: 2019-09-03
Payer: COMMERCIAL

## 2019-09-03 VITALS
BODY MASS INDEX: 50 KG/M2 | SYSTOLIC BLOOD PRESSURE: 136 MMHG | HEART RATE: 65 BPM | DIASTOLIC BLOOD PRESSURE: 88 MMHG | WEIGHT: 293 LBS

## 2019-09-03 DIAGNOSIS — R06.02 SOB (SHORTNESS OF BREATH): ICD-10-CM

## 2019-09-03 DIAGNOSIS — E78.5 HYPERLIPIDEMIA, UNSPECIFIED HYPERLIPIDEMIA TYPE: ICD-10-CM

## 2019-09-03 DIAGNOSIS — Z83.49 FAMILY HISTORY OF THYROID DISORDER: ICD-10-CM

## 2019-09-03 DIAGNOSIS — K82.4 GALL BLADDER POLYP: ICD-10-CM

## 2019-09-03 DIAGNOSIS — Z86.39 HISTORY OF HYPERTHYROIDISM: Primary | ICD-10-CM

## 2019-09-03 DIAGNOSIS — K21.9 GASTROESOPHAGEAL REFLUX DISEASE, ESOPHAGITIS PRESENCE NOT SPECIFIED: ICD-10-CM

## 2019-09-03 PROCEDURE — 86376 MICROSOMAL ANTIBODY EACH: CPT

## 2019-09-03 PROCEDURE — 84443 ASSAY THYROID STIM HORMONE: CPT

## 2019-09-03 PROCEDURE — 83036 HEMOGLOBIN GLYCOSYLATED A1C: CPT

## 2019-09-03 PROCEDURE — 99213 OFFICE O/P EST LOW 20 MIN: CPT | Performed by: INTERNAL MEDICINE

## 2019-09-03 PROCEDURE — 36415 COLL VENOUS BLD VENIPUNCTURE: CPT

## 2019-09-03 PROCEDURE — 80053 COMPREHEN METABOLIC PANEL: CPT

## 2019-09-03 PROCEDURE — 80061 LIPID PANEL: CPT

## 2019-09-03 NOTE — PROGRESS NOTES
Return Office Visit     CHIEF COMPLAINT:  Patient presents with: Follow - Up: Hyperthyroidism, thyroiditis       HISTORY OF PRESENT ILLNESS:  Lydia Truong is a 47year old female who presents for follow up for Hyperthyroidism.    She presented to her PCP 800 mg by mouth. Disp:  Rfl:    PEG 3350-KCl-Na Bicarb-NaCl (TRILYTE) 420 g Oral Recon Soln As directed.  Disp: 1 Bottle Rfl: 0   Albuterol Sulfate  (90 Base) MCG/ACT Inhalation Aero Soln Inhale 2 puffs into the lungs every 6 (six) hours as needed fo diarrhea, constipation, bleeding  Neurology: Negative for: headache, numbness, weakness  Genito-Urinary: Negative for: dysuria, frequency  Psychiatric: Negative for:  depression, anxiety  Hematology/Lymphatics: Negative for: bruising, lower extremity edema

## 2019-09-06 DIAGNOSIS — N17.9 AKI (ACUTE KIDNEY INJURY) (HCC): ICD-10-CM

## 2019-09-06 DIAGNOSIS — D64.9 ANEMIA, UNSPECIFIED TYPE: Primary | ICD-10-CM

## 2019-09-16 ENCOUNTER — TELEPHONE (OUTPATIENT)
Dept: INTERNAL MEDICINE CLINIC | Facility: CLINIC | Age: 54
End: 2019-09-16

## 2019-09-16 DIAGNOSIS — R74.8 ELEVATED LIVER ENZYMES: Primary | ICD-10-CM

## 2019-09-16 NOTE — TELEPHONE ENCOUNTER
Pt called and states that she needs to speak to Dr Tari Joiner today. Pt has migraines and Dr Tari Joiner said that Pt may be dehydrated, Pt states that she is now having body spasms and wants to be seen by Dr Tari Joiner.

## 2019-09-16 NOTE — TELEPHONE ENCOUNTER
Called patient per Dr Yasmani Antonio to inform her that Dr Yasmani Antonio would like her to go to the ER this way we can get all the tests done in one day and get results right away, informed patient that if she is dehydrated she will need an IV.      Patient got upset

## 2019-09-16 NOTE — TELEPHONE ENCOUNTER
Patient is having body spasms, throwing up and dehydrated due to a severe migraine she has had for a week. She's asking if there is a medication that she could take to help her feel better?

## 2019-09-17 ENCOUNTER — TELEPHONE (OUTPATIENT)
Dept: INTERNAL MEDICINE CLINIC | Facility: CLINIC | Age: 54
End: 2019-09-17

## 2019-09-17 RX ORDER — CYCLOBENZAPRINE HCL 10 MG
10 TABLET ORAL 2 TIMES DAILY PRN
Qty: 20 TABLET | Refills: 1 | Status: SHIPPED | OUTPATIENT
Start: 2019-09-17 | End: 2019-09-27

## 2019-09-17 NOTE — TELEPHONE ENCOUNTER
Called to ask if  Dr. Morteza Saul could please give her a call back, so she can give her an update on the visit to the ER for her migraine headaches.

## 2019-09-19 ENCOUNTER — TELEPHONE (OUTPATIENT)
Dept: GASTROENTEROLOGY | Facility: CLINIC | Age: 54
End: 2019-09-19

## 2019-09-19 DIAGNOSIS — Z86.010 HISTORY OF COLON POLYPS: ICD-10-CM

## 2019-09-19 DIAGNOSIS — D64.9 ANEMIA, UNSPECIFIED TYPE: Primary | ICD-10-CM

## 2019-09-19 NOTE — TELEPHONE ENCOUNTER
Scheduled for:  Colonoscopy 14878  Provider Name: Dr. Akin Dye  Date:  12/2/19  Location:  [facility]  Sedation:  MAC  Time:   0830 (pt is aware to arrive at 0730)   Prep:  Gearline Pat, sent via Mychart  Meds/Allergies Reconciled?:  Physician reviewed   Diagnosis wi

## 2019-09-19 NOTE — TELEPHONE ENCOUNTER
Routed to GI schedulers to address, thank you. Pt is looking to reschedule, see TE from 7/11/2019 for orders, thank you.

## 2019-09-19 NOTE — TELEPHONE ENCOUNTER
Pt calling and would like to know when is dr Pepe Cordero next procedure date .  States she was scheduled and had to cancel and would like to know

## 2019-09-30 ENCOUNTER — TELEPHONE (OUTPATIENT)
Dept: INTERNAL MEDICINE CLINIC | Facility: CLINIC | Age: 54
End: 2019-09-30

## 2019-09-30 ENCOUNTER — LAB ENCOUNTER (OUTPATIENT)
Dept: LAB | Age: 54
End: 2019-09-30
Attending: INTERNAL MEDICINE
Payer: COMMERCIAL

## 2019-09-30 DIAGNOSIS — R74.8 ELEVATED LIVER ENZYMES: ICD-10-CM

## 2019-09-30 DIAGNOSIS — D64.9 ANEMIA, UNSPECIFIED TYPE: ICD-10-CM

## 2019-09-30 DIAGNOSIS — N17.9 AKI (ACUTE KIDNEY INJURY) (HCC): ICD-10-CM

## 2019-09-30 DIAGNOSIS — Z86.39 HISTORY OF HYPERTHYROIDISM: ICD-10-CM

## 2019-09-30 PROCEDURE — 84439 ASSAY OF FREE THYROXINE: CPT

## 2019-09-30 PROCEDURE — 82570 ASSAY OF URINE CREATININE: CPT

## 2019-09-30 PROCEDURE — 80076 HEPATIC FUNCTION PANEL: CPT

## 2019-09-30 PROCEDURE — 84481 FREE ASSAY (FT-3): CPT

## 2019-09-30 PROCEDURE — 82043 UR ALBUMIN QUANTITATIVE: CPT

## 2019-09-30 PROCEDURE — 84443 ASSAY THYROID STIM HORMONE: CPT

## 2019-09-30 PROCEDURE — 80048 BASIC METABOLIC PNL TOTAL CA: CPT

## 2019-09-30 PROCEDURE — 85025 COMPLETE CBC W/AUTO DIFF WBC: CPT

## 2019-09-30 PROCEDURE — 81003 URINALYSIS AUTO W/O SCOPE: CPT

## 2019-09-30 PROCEDURE — 36415 COLL VENOUS BLD VENIPUNCTURE: CPT

## 2019-09-30 RX ORDER — ONDANSETRON 4 MG/1
4 TABLET, ORALLY DISINTEGRATING ORAL EVERY 8 HOURS PRN
Qty: 30 TABLET | Refills: 0 | Status: SHIPPED | OUTPATIENT
Start: 2019-09-30 | End: 2019-10-30

## 2019-10-02 ENCOUNTER — TELEPHONE (OUTPATIENT)
Dept: ENDOCRINOLOGY CLINIC | Facility: CLINIC | Age: 54
End: 2019-10-02

## 2019-10-02 DIAGNOSIS — E05.90 HYPERTHYROIDISM: Primary | ICD-10-CM

## 2019-10-02 NOTE — TELEPHONE ENCOUNTER
Regarding: RE: Test Results Question  Contact: [phone]  ----- Message from Shane Mendez [address] [place] [address] Av sent at 10/1/2019  3:46 PM CDT -----       ----- Message sent from Ara Gupta [address] [place] [address] Ave to Melanie Torres at 10/1/2019  3:46 PM -----   Gil Maldonado

## 2019-10-02 NOTE — TELEPHONE ENCOUNTER
Called patient and discussed results in detail  Seems like she is getting hyperthyroid again ( has been off [address] [place] [address]).    After a long discussion about the next steps, patient decided the following:   - NM thyroid scan and uptake: she has new insurance and will

## 2019-10-03 DIAGNOSIS — N17.9 AKI (ACUTE KIDNEY INJURY) (HCC): Primary | ICD-10-CM

## 2019-10-04 ENCOUNTER — TELEPHONE (OUTPATIENT)
Dept: INTERNAL MEDICINE CLINIC | Facility: CLINIC | Age: 54
End: 2019-10-04

## 2019-10-04 NOTE — TELEPHONE ENCOUNTER
Patient called, as she would like Dr. Analia Gómez to please call her, as she has a question regarding her test results. She said to please state in this message, next week is fine to call her back, she's not in a hurry.      Patient called back and schedule

## 2019-10-15 ENCOUNTER — TELEPHONE (OUTPATIENT)
Dept: ENDOCRINOLOGY CLINIC | Facility: CLINIC | Age: 54
End: 2019-10-15

## 2019-10-15 NOTE — TELEPHONE ENCOUNTER
Summer/Insurance verification states PA is needed for [place] Thyroid uptake & scan scheduld for 10/17/19. Procedure code 11058.  Please call [facility] [phone]

## 2019-10-15 NOTE — TELEPHONE ENCOUNTER
No PA required per [employer]. Called and notified Michelle Leung that PA was not required. She will update notes.

## 2019-10-29 ENCOUNTER — HOSPITAL ENCOUNTER (OUTPATIENT)
Dept: NUCLEAR MEDICINE | Facility: HOSPITAL | Age: 54
Discharge: HOME | End: 2019-10-29
Attending: INTERNAL MEDICINE
Payer: COMMERCIAL

## 2019-10-29 ENCOUNTER — HOSPITAL ENCOUNTER (OUTPATIENT)
Dept: GENERAL RADIOLOGY | Facility: HOSPITAL | Age: 54
Discharge: HOME | End: 2019-10-29
Attending: PODIATRIST
Payer: COMMERCIAL

## 2019-10-29 ENCOUNTER — PATIENT MESSAGE (OUTPATIENT)
Dept: ENDOCRINOLOGY CLINIC | Facility: CLINIC | Age: 54
End: 2019-10-29

## 2019-10-29 ENCOUNTER — HOSPITAL ENCOUNTER (OUTPATIENT)
Dept: ULTRASOUND IMAGING | Age: 54
Discharge: HOME | End: 2019-10-29
Attending: INTERNAL MEDICINE
Payer: COMMERCIAL

## 2019-10-29 ENCOUNTER — OFFICE VISIT (OUTPATIENT)
Dept: PODIATRY CLINIC | Facility: CLINIC | Age: 54
End: 2019-10-29
Payer: COMMERCIAL

## 2019-10-29 ENCOUNTER — APPOINTMENT (OUTPATIENT)
Dept: LAB | Facility: HOSPITAL | Age: 54
End: 2019-10-29
Attending: INTERNAL MEDICINE
Payer: COMMERCIAL

## 2019-10-29 DIAGNOSIS — M79.671 RIGHT FOOT PAIN: Primary | ICD-10-CM

## 2019-10-29 DIAGNOSIS — E05.90 HYPERTHYROIDISM: ICD-10-CM

## 2019-10-29 DIAGNOSIS — M79.671 RIGHT FOOT PAIN: ICD-10-CM

## 2019-10-29 DIAGNOSIS — M21.611 BUNION OF GREAT TOE OF RIGHT FOOT: ICD-10-CM

## 2019-10-29 DIAGNOSIS — N17.9 AKI (ACUTE KIDNEY INJURY) (HCC): ICD-10-CM

## 2019-10-29 PROCEDURE — 84439 ASSAY OF FREE THYROXINE: CPT

## 2019-10-29 PROCEDURE — 78014 THYROID IMAGING W/BLOOD FLOW: CPT | Performed by: INTERNAL MEDICINE

## 2019-10-29 PROCEDURE — 36415 COLL VENOUS BLD VENIPUNCTURE: CPT

## 2019-10-29 PROCEDURE — 76775 US EXAM ABDO BACK WALL LIM: CPT | Performed by: INTERNAL MEDICINE

## 2019-10-29 PROCEDURE — 84443 ASSAY THYROID STIM HORMONE: CPT

## 2019-10-29 PROCEDURE — 99212 OFFICE O/P EST SF 10 MIN: CPT | Performed by: PODIATRIST

## 2019-10-29 PROCEDURE — 73630 X-RAY EXAM OF FOOT: CPT | Performed by: PODIATRIST

## 2019-10-29 PROCEDURE — 84481 FREE ASSAY (FT-3): CPT

## 2019-10-29 RX ORDER — CYCLOBENZAPRINE HCL 10 MG
TABLET ORAL
Status: ON HOLD | COMMUNITY
Start: 2019-09-17 | End: 2020-07-19

## 2019-10-29 RX ORDER — FLUTICASONE FUROATE AND VILANTEROL TRIFENATATE 100; 25 UG/1; UG/1
POWDER RESPIRATORY (INHALATION)
Refills: 1 | COMMUNITY
Start: 2019-09-30 | End: 2021-05-28 | Stop reason: ALTCHOICE

## 2019-10-29 RX ORDER — OMEPRAZOLE 40 MG/1
CAPSULE, DELAYED RELEASE ORAL
COMMUNITY
Start: 2019-02-27 | End: 2020-02-26

## 2019-10-29 RX ORDER — METHYLPREDNISOLONE 4 MG/1
TABLET ORAL
Qty: 1 PACKAGE | Refills: 0 | Status: SHIPPED | OUTPATIENT
Start: 2019-10-29 | End: 2019-11-24

## 2019-10-29 RX ORDER — BUDESONIDE AND FORMOTEROL FUMARATE DIHYDRATE 160; 4.5 UG/1; UG/1
AEROSOL RESPIRATORY (INHALATION)
COMMUNITY
Start: 2019-02-27 | End: 2020-02-26

## 2019-10-29 NOTE — PROGRESS NOTES
HPI:    Patient ID: Tien Arteaga is a 47year old female. 79-year-old female presents to the office today with concerns associated with the right great toe. The last time I saw this patient was in March 2019.   She underwent right bunionectomy and harvey Solution, INSTILL 1 DROP INTO BOTH EYES TWICE DAILY FOR 2 WEEKS, Disp: , Rfl: 1  Albuterol Sulfate HFA (PROVENTIL HFA) 108 (90 Base) MCG/ACT Inhalation Aero Soln, Inhale into the lungs. , Disp: , Rfl:   erythromycin 5 MG/GM Ophthalmic Ointment, , Disp: , Rf great toe of right foot    No orders of the defined types were placed in this encounter.       Meds This Visit:  Requested Prescriptions     Signed Prescriptions Disp Refills   • methylPREDNISolone 4 MG Oral Tablet Therapy Pack 1 Package 0     Sig: Take as

## 2019-10-31 ENCOUNTER — PATIENT MESSAGE (OUTPATIENT)
Dept: ENDOCRINOLOGY CLINIC | Facility: CLINIC | Age: 54
End: 2019-10-31

## 2019-11-01 NOTE — TELEPHONE ENCOUNTER
From: Jessica Florian  To: Maxine Damon MD  Sent: 10/31/2019 5:50 PM CDT  Subject: Test Results Question    Dr Jovita Lin    I had the uptake yesterday. When the results are known I would like an appointment to see you as I have lots of questions.  Thanks

## 2019-11-05 ENCOUNTER — OFFICE VISIT (OUTPATIENT)
Dept: ENDOCRINOLOGY CLINIC | Facility: CLINIC | Age: 54
End: 2019-11-05
Payer: COMMERCIAL

## 2019-11-05 VITALS
HEART RATE: 81 BPM | DIASTOLIC BLOOD PRESSURE: 81 MMHG | WEIGHT: 293 LBS | BODY MASS INDEX: 49 KG/M2 | SYSTOLIC BLOOD PRESSURE: 141 MMHG

## 2019-11-05 DIAGNOSIS — E05.90 HYPERTHYROIDISM: ICD-10-CM

## 2019-11-05 DIAGNOSIS — E04.1 THYROID NODULE: Primary | ICD-10-CM

## 2019-11-05 PROCEDURE — 99213 OFFICE O/P EST LOW 20 MIN: CPT | Performed by: INTERNAL MEDICINE

## 2019-11-05 NOTE — PROGRESS NOTES
Return Office Visit     CHIEF COMPLAINT:  Patient presents with: Follow - Up: thyroid scan results       HISTORY OF PRESENT ILLNESS:  Rosa Anguiano is a 47year old female who presents for follow up for Hyperthyroidism.    She presented to her PCP with LENNY SMALL METHIMAZOLE 10 MG Oral Tab TAKE 3 TABLETS BY MOUTH EVERY DAY 90 tablet 0   • PROPRANOLOL HCL 20 MG Oral Tab TAKE 1 TABLET BY MOUTH EVERY DAY 30 tablet 2   • hydrochlorothiazide (MICROZIDE) 12.5 MG Oral Cap Take  by mouth.      • naproxen 500 MG Oral Tab Danie Castro anxiety  Hematology/Lymphatics: Negative for: bruising, lower extremity edema  Endocrine: Negative for: polyuria, polydypsia  Skin: Negative for: rash, blister, cellulitis,       PHYSICAL EXAM:    11/05/19  0948   BP: 141/81   Pulse: 81   Weight: (!) 311 l

## 2019-11-08 ENCOUNTER — TELEPHONE (OUTPATIENT)
Dept: GASTROENTEROLOGY | Facility: CLINIC | Age: 54
End: 2019-11-08

## 2019-11-08 NOTE — TELEPHONE ENCOUNTER
Patient called to confirm appointment scheduled 11/27/19 1:00PM,informed of location and to arrive . 15mins early, thanks.

## 2019-11-08 NOTE — TELEPHONE ENCOUNTER
Noted, thank you.      Future Appointments   Date Time Provider Cecile Nava   11/27/2019  1:00 PM Karley Wayne MD [facility]

## 2019-11-08 NOTE — TELEPHONE ENCOUNTER
Discussed w/ BAKARI OK to add. LMTCB-CSS please confirm appt with Dr. Kenji Waters on 11/27/2019 at [address] [place] [address] at the [facility], please ESVIN RN's, [address] [place] [address]!

## 2019-11-08 NOTE — TELEPHONE ENCOUNTER
I can see her November 27th at 1 PM     Thanks    Kadie Man MD  [facility] - Gastroenterology  11/8/2019  2:19 PM

## 2019-11-08 NOTE — TELEPHONE ENCOUNTER
Patient demands to speak with RN regarding Liver Enzymes and demands to know why she cannot be seen sooner. Please call. Thank you.

## 2019-11-08 NOTE — TELEPHONE ENCOUNTER
Pt states Dr. Nilda Gunter told her to she had elevated LFT's (see labs from 9/30/2019) and is requesting to see Dr. Yolis Gutierrez within this month, does not want to wait until December to be seen. Dr. Yolis Gutierrez-  Please advise, thank you.

## 2019-11-13 ENCOUNTER — OFFICE VISIT (OUTPATIENT)
Dept: FAMILY MEDICINE CLINIC | Facility: CLINIC | Age: 54
End: 2019-11-13
Payer: COMMERCIAL

## 2019-11-13 ENCOUNTER — OFFICE VISIT (OUTPATIENT)
Dept: NEPHROLOGY | Facility: CLINIC | Age: 54
End: 2019-11-13
Payer: COMMERCIAL

## 2019-11-13 VITALS
HEART RATE: 88 BPM | BODY MASS INDEX: 47.09 KG/M2 | SYSTOLIC BLOOD PRESSURE: 158 MMHG | TEMPERATURE: 98 F | DIASTOLIC BLOOD PRESSURE: 89 MMHG | HEIGHT: 66 IN | WEIGHT: 293 LBS

## 2019-11-13 VITALS
TEMPERATURE: 98 F | SYSTOLIC BLOOD PRESSURE: 160 MMHG | WEIGHT: 293 LBS | OXYGEN SATURATION: 96 % | RESPIRATION RATE: 16 BRPM | BODY MASS INDEX: 50 KG/M2 | HEART RATE: 91 BPM | DIASTOLIC BLOOD PRESSURE: 90 MMHG

## 2019-11-13 DIAGNOSIS — R68.89 ITCHY EYES: ICD-10-CM

## 2019-11-13 DIAGNOSIS — H02.843 SWELLING OF RIGHT EYELID: ICD-10-CM

## 2019-11-13 DIAGNOSIS — I10 ESSENTIAL HYPERTENSION: ICD-10-CM

## 2019-11-13 DIAGNOSIS — R03.0 ELEVATED BLOOD PRESSURE READING: ICD-10-CM

## 2019-11-13 DIAGNOSIS — N17.9 AKI (ACUTE KIDNEY INJURY) (HCC): Primary | ICD-10-CM

## 2019-11-13 DIAGNOSIS — J06.9 UPPER RESPIRATORY INFECTION, ACUTE: Primary | ICD-10-CM

## 2019-11-13 PROCEDURE — 99245 OFF/OP CONSLTJ NEW/EST HI 55: CPT | Performed by: INTERNAL MEDICINE

## 2019-11-13 PROCEDURE — 99213 OFFICE O/P EST LOW 20 MIN: CPT | Performed by: PHYSICIAN ASSISTANT

## 2019-11-13 RX ORDER — BEPOTASTINE BESILATE 15 MG/ML
1 SOLUTION/ DROPS OPHTHALMIC 2 TIMES DAILY
Qty: 1 BOTTLE | Refills: 0 | Status: SHIPPED | OUTPATIENT
Start: 2019-11-13 | End: 2020-02-26

## 2019-11-13 RX ORDER — ERYTHROMYCIN 5 MG/G
OINTMENT OPHTHALMIC
Qty: 1 TUBE | Refills: 0 | Status: SHIPPED | OUTPATIENT
Start: 2019-11-13 | End: 2020-04-05 | Stop reason: ALTCHOICE

## 2019-11-13 NOTE — PATIENT INSTRUCTIONS
Stop ibuprofen   Follow up in 4 weeks  Lab test 3-4 days prior to next visit   Stop omeprazole and black cohosh   Bring home blood pressure readings and weight on next visit

## 2019-11-13 NOTE — PROGRESS NOTES
Consult Requested By: Dr. Jocy Cabral    Reason for Consult: TE     HPI:     Patient is a 47 yrs old female with pmh of HL, hyperthyroidism, obesity, herpes who presented for work up and evaluation of kidney disease     Lab test showed BUN/Cr 11/1.14 Outpatient Medications   Medication Sig Dispense Refill   • Bepotastine Besilate (BEPREVE) 1.5 % Ophthalmic Solution Apply 1 drop to eye 2 (two) times daily.  Prn itchy eyes x 2 weeks 1 Bottle 0   • erythromycin 5 MG/GM Ophthalmic Ointment Apply 1 cm ribbon Negative for decreased activity, fever  ENMT:  Negative for ear drainage, hearing loss and nasal drainage  Eyes:  Negative for eye discharge and vision loss  Cardiovascular:  Negative for chest pain, sobs  Respiratory:  Negative for cough, dyspnea and whee now   - daily weights  - cont. hctz     2.  HTN:  - on hctz/trimaterene  - bring home BP readings on next visit    Follow up in 4 weeks     Lab test 3-4 days prior to next visit     Orders This Visit:  Orders Placed This Encounter      Alkaline Phosphatase I

## 2019-11-13 NOTE — PROGRESS NOTES
CHIEF COMPLAINT:   Patient presents with:  Cough: since yesterday, would like lungs checked  Eye Problem: itchy eyes, and right eyelid swelling  with crusty discharge      HPI:   Marc Mejia is a 47year old female who presents for complaints of right e MG Oral Tab START WITH 1/2 TAB AT NIGHT IF TOLERATING CAN TAKE 1 TAB 2 TIMES DAILY AS NEEDED FOR MUSCLE SPASM     •       • hydrochlorothiazide (MICROZIDE) 12.5 MG Oral Cap Take  by mouth. • naproxen 500 MG Oral Tab Take 1 tablet by mouth.      • PEG 33 lesions  HEAD: atraumatic, normocephalic, on tenderness on palpation of maxillary sinuses, no frontal sinus tenderness  EYES: PERRLA, EOMI, mild conjunctiva erythema on the right with upper and lower eyelid swelling, no discharge noted, no scleral injectio Bepotastine Besilate (BEPREVE) 1.5 % Ophthalmic Solution      erythromycin 5 MG/GM Ophthalmic Ointment    Reviewed importance of routine eye exams.    If symptoms of sudden onset of eye pain, photophobia, blurry vision, or erythema surrounding orbit develop

## 2019-11-13 NOTE — PATIENT INSTRUCTIONS
Preventing Common Respiratory Infections    Respiratory infections such as colds and influenza (“the flu”) are common in winter. These infections are often caused by viruses. They may share some symptoms, but not all respiratory infections are the same. A flu vaccine protects you from influenza (but not other colds or infections). Get a vaccine each fall, before flu season starts. This can be done at a clinic, healthcare provider’s office, drugstore, senior center, or through your workplace.   Get pneumoco Seasonal allergy is also called hay fever. It may occur after a person is exposed to pollens released from grasses, weeds, trees and shrubs.  This type of allergy occurs during the spring and summer when the pollen contacts the lining of the nose, eyes, eye · Steroid nasal sprays or oral steroids may also be prescribed for more severe symptoms. These help to reduce the local inflammation that can add to the allergic response. · If you have asthma, pollen season may make your asthma symptoms worse.  It is impo © 1032-3337 [employer] [address] [facility], [facility] [place]. All rights reserved. This information is not intended as a substitute for professional medical care. Always follow your healthcare professional's instructions.

## 2019-11-19 ENCOUNTER — OFFICE VISIT (OUTPATIENT)
Dept: PODIATRY CLINIC | Facility: CLINIC | Age: 54
End: 2019-11-19
Payer: COMMERCIAL

## 2019-11-19 DIAGNOSIS — G58.9 NERVE ENTRAPMENT: Primary | ICD-10-CM

## 2019-11-19 PROCEDURE — 99212 OFFICE O/P EST SF 10 MIN: CPT | Performed by: PODIATRIST

## 2019-11-19 NOTE — PROGRESS NOTES
HPI:    Patient ID: Sadie Kern is a 47year old female. 42-year-old female presents for follow-up in reference to the numbness sensation of the right great toe. The use of oral cortisone did not help her great toe.   She states that it did wonders f External Cream Apply 1 Application topically 2 (two) times daily. 45 g 0   • Red Yeast Rice Extract (RED YEAST RICE OR) Take by mouth.        Allergies:  Codeine                 JITTERY, OTHER (SEE COMMENTS)  Nitroglycerin           OTHER (SEE COMMENTS)

## 2019-11-20 ENCOUNTER — TELEPHONE (OUTPATIENT)
Dept: PHYSICAL THERAPY | Facility: HOSPITAL | Age: 54
End: 2019-11-20

## 2019-11-25 RX ORDER — METHYLPREDNISOLONE 4 MG/1
TABLET ORAL
Qty: 1 PACKAGE | Refills: 0 | Status: SHIPPED | OUTPATIENT
Start: 2019-11-25 | End: 2020-02-26

## 2019-11-25 NOTE — TELEPHONE ENCOUNTER
[facility] - please advise on refill request    10/29/19 original prescription written    LOV 11/19/19    At LOV: \"47year-old female presents for follow-up in reference to the numbness sensation of the right great toe.   The use of oral cortisone did not h

## 2019-11-27 ENCOUNTER — APPOINTMENT (OUTPATIENT)
Dept: LAB | Age: 54
End: 2019-11-27
Attending: INTERNAL MEDICINE
Payer: COMMERCIAL

## 2019-11-27 ENCOUNTER — OFFICE VISIT (OUTPATIENT)
Dept: PHYSICAL THERAPY | Age: 54
End: 2019-11-27
Attending: INTERNAL MEDICINE
Payer: COMMERCIAL

## 2019-11-27 DIAGNOSIS — E05.90 HYPERTHYROIDISM: ICD-10-CM

## 2019-11-27 PROCEDURE — 84481 FREE ASSAY (FT-3): CPT

## 2019-11-27 PROCEDURE — 97110 THERAPEUTIC EXERCISES: CPT | Performed by: PHYSICAL THERAPIST

## 2019-11-27 PROCEDURE — 97162 PT EVAL MOD COMPLEX 30 MIN: CPT | Performed by: PHYSICAL THERAPIST

## 2019-11-27 PROCEDURE — 84439 ASSAY OF FREE THYROXINE: CPT

## 2019-11-27 PROCEDURE — 36415 COLL VENOUS BLD VENIPUNCTURE: CPT

## 2019-11-27 PROCEDURE — 84443 ASSAY THYROID STIM HORMONE: CPT

## 2019-11-27 NOTE — PROGRESS NOTES
LOWER EXTREMITY EVALUATION:   Referring Physician: Dr. Ania Antonio  Diagnosis: Nerve entrapment (G58.9)   Date of Onset: 1 year ago Date of Service: 11/27/2019     PATIENT Sho Mayer is a 47year old y/o female who presents to therapy today with Patient was instructed on a HEP for: (L) 1st MTP repeated PF 10-20 reps every 2-3 hours; test motion to compare before and after her exercise.     Charges: PT Eval x1, TherEx x 1      Total Timed Treatment: 15 mins     Total Treatment Time: 45 mins Cert MDT    Physician's certification required: Yes  I certify the need for these services furnished under this plan of treatment and while under my care.     X___________________________________________________ Date____________________    Certification Clyde Van

## 2019-11-28 ENCOUNTER — PATIENT MESSAGE (OUTPATIENT)
Dept: ENDOCRINOLOGY CLINIC | Facility: CLINIC | Age: 54
End: 2019-11-28

## 2019-11-29 ENCOUNTER — OFFICE VISIT (OUTPATIENT)
Dept: PHYSICAL THERAPY | Age: 54
End: 2019-11-29
Attending: INTERNAL MEDICINE
Payer: COMMERCIAL

## 2019-11-29 PROCEDURE — 97110 THERAPEUTIC EXERCISES: CPT | Performed by: PHYSICAL THERAPIST

## 2019-11-29 NOTE — PROGRESS NOTES
Date: 11/29/2019     Dx:  Nerve entrapment (G58.9)           Authorized # of Visits:  6       Referring MD: Stephanie Motley  Next MD visit: none scheduled    Medication Changes since last visit?: No    Subjective: Patient reports that she has less pain/ache in the and recreational activity. Plan:   Re-assess next session and continue with directional preference exercises, stability exercises, eccentric strengthening, and patient education. Charges:  TherEx x 4       Total Timed Treatment: 54 mins To

## 2019-12-01 ENCOUNTER — PATIENT MESSAGE (OUTPATIENT)
Dept: GASTROENTEROLOGY | Facility: CLINIC | Age: 54
End: 2019-12-01

## 2019-12-02 ENCOUNTER — ANESTHESIA (OUTPATIENT)
Dept: ENDOSCOPY | Facility: HOSPITAL | Age: 54
End: 2019-12-02
Payer: COMMERCIAL

## 2019-12-02 ENCOUNTER — ANESTHESIA EVENT (OUTPATIENT)
Dept: ENDOSCOPY | Facility: HOSPITAL | Age: 54
End: 2019-12-02
Payer: COMMERCIAL

## 2019-12-02 ENCOUNTER — HOSPITAL ENCOUNTER (OUTPATIENT)
Facility: HOSPITAL | Age: 54
Setting detail: HOSPITAL OUTPATIENT SURGERY
Discharge: HOME | End: 2019-12-02
Attending: INTERNAL MEDICINE | Admitting: INTERNAL MEDICINE
Payer: COMMERCIAL

## 2019-12-02 VITALS
OXYGEN SATURATION: 97 % | HEIGHT: 67 IN | WEIGHT: 293 LBS | BODY MASS INDEX: 45.99 KG/M2 | SYSTOLIC BLOOD PRESSURE: 148 MMHG | DIASTOLIC BLOOD PRESSURE: 89 MMHG | RESPIRATION RATE: 21 BRPM | HEART RATE: 72 BPM

## 2019-12-02 DIAGNOSIS — D64.9 ANEMIA, UNSPECIFIED TYPE: ICD-10-CM

## 2019-12-02 DIAGNOSIS — Z86.010 HISTORY OF COLON POLYPS: ICD-10-CM

## 2019-12-02 PROCEDURE — 45380 COLONOSCOPY AND BIOPSY: CPT | Performed by: INTERNAL MEDICINE

## 2019-12-02 PROCEDURE — 0DBL8ZX EXCISION OF TRANSVERSE COLON, VIA NATURAL OR ARTIFICIAL OPENING ENDOSCOPIC, DIAGNOSTIC: ICD-10-PCS | Performed by: INTERNAL MEDICINE

## 2019-12-02 PROCEDURE — 0DBP8ZX EXCISION OF RECTUM, VIA NATURAL OR ARTIFICIAL OPENING ENDOSCOPIC, DIAGNOSTIC: ICD-10-PCS | Performed by: INTERNAL MEDICINE

## 2019-12-02 RX ORDER — LIDOCAINE HYDROCHLORIDE 10 MG/ML
INJECTION, SOLUTION EPIDURAL; INFILTRATION; INTRACAUDAL; PERINEURAL AS NEEDED
Status: DISCONTINUED | OUTPATIENT
Start: 2019-12-02 | End: 2019-12-02 | Stop reason: SURG

## 2019-12-02 RX ORDER — SODIUM CHLORIDE, SODIUM LACTATE, POTASSIUM CHLORIDE, CALCIUM CHLORIDE 600; 310; 30; 20 MG/100ML; MG/100ML; MG/100ML; MG/100ML
INJECTION, SOLUTION INTRAVENOUS CONTINUOUS
Status: DISCONTINUED | OUTPATIENT
Start: 2019-12-02 | End: 2019-12-02

## 2019-12-02 RX ORDER — NALOXONE HYDROCHLORIDE 0.4 MG/ML
80 INJECTION, SOLUTION INTRAMUSCULAR; INTRAVENOUS; SUBCUTANEOUS AS NEEDED
Status: DISCONTINUED | OUTPATIENT
Start: 2019-12-02 | End: 2019-12-02

## 2019-12-02 RX ADMIN — SODIUM CHLORIDE, SODIUM LACTATE, POTASSIUM CHLORIDE, CALCIUM CHLORIDE: 600; 310; 30; 20 INJECTION, SOLUTION INTRAVENOUS at 08:56:00

## 2019-12-02 RX ADMIN — SODIUM CHLORIDE, SODIUM LACTATE, POTASSIUM CHLORIDE, CALCIUM CHLORIDE: 600; 310; 30; 20 INJECTION, SOLUTION INTRAVENOUS at 09:28:00

## 2019-12-02 RX ADMIN — LIDOCAINE HYDROCHLORIDE 50 MG: 10 INJECTION, SOLUTION EPIDURAL; INFILTRATION; INTRACAUDAL; PERINEURAL at 08:58:00

## 2019-12-02 NOTE — TELEPHONE ENCOUNTER
Pt currently at endoscopy at this time as her procedure is scheduled 12/2/2019 at 8:30AM (arrival 7:30AM). She sent a [employer] message regarding prep instructions on Sunday, 12/1/2019 when GI office is closed.  Pt received updated prep instructions on 9/19/2

## 2019-12-02 NOTE — H&P
History & Physical Examination    Patient Name: Lydia Truong  MRN: [id number]  CSN: [id number]  YOB: 1965    Diagnosis: history of adenomatous colon polyp    methylPREDNISolone 4 MG Oral Tablet Therapy Pack, Take as directed., Disp: 1 Packag DAILY FOR 2 WEEKS, Disp: , Rfl: 1, Taking  erythromycin 5 MG/GM Ophthalmic Ointment, , Disp: , Rfl: , Taking  betamethasone valerate 0.1 % External Cream, Apply 1 Application topically 2 (two) times daily. , Disp: 45 g, Rfl: 0, Taking      lactated ringers Gastroenterology  12/2/2019  8:54 AM

## 2019-12-02 NOTE — TELEPHONE ENCOUNTER
From: Yenni Fung  To: Tadeo Wooten MD  Sent: 12/1/2019 9:41 AM CST  Subject: Prescription Question    Your staff did not tell me how to take the bowel prep nor did they call me in anything new.  I still have and will take, the bowel prep i have on

## 2019-12-02 NOTE — ANESTHESIA POSTPROCEDURE EVALUATION
Patient: Lydia Truong    Procedure Summary     Date:  12/02/19 Room / Location:  [address] [place] [address] ENDOSCOPY 01 / [address] [place] [address] ENDOSCOPY    Anesthesia Start:  4964 Anesthesia Stop:  0759    Procedure:  COLONOSCOPY (N/A ) Diagnosis:       Anemia, unspecified type      History o

## 2019-12-02 NOTE — ADDENDUM NOTE
Addendum  created 12/02/19 1032 by Andrew Waters CRNA    Review and Sign - Ready for Procedure, Review and Sign - Signed

## 2019-12-02 NOTE — OPERATIVE REPORT
Colonoscopy Report    Sobeida Moody     1965 Age 47year old   PCP Aubrey Vieira MD Endoscopist Jerome Hardin MD     Date of procedure: 19    Procedure: Colonoscopy w/ biopsy    Pre-operative diagnosis: history of adenomatous colon poly complications. The patient’s vital signs were monitored throughout the procedure and remained stable.     Estimated blood loss: insignificant    Specimens collected:  Colon and rectal polyps    Complications: none     Colonoscopy findings:    Cecum: normal

## 2019-12-05 ENCOUNTER — TELEPHONE (OUTPATIENT)
Dept: GASTROENTEROLOGY | Facility: CLINIC | Age: 54
End: 2019-12-05

## 2019-12-05 NOTE — TELEPHONE ENCOUNTER
I mailed out colonoscopy results letter to pt  Updated health maintenance  Entered into 5 yr CLN recall  Recall colon in 5 years per.  Colon done 12/02/19    GI staff: please place recall for colonoscopy in 5 years

## 2019-12-06 ENCOUNTER — OFFICE VISIT (OUTPATIENT)
Dept: PHYSICAL THERAPY | Age: 54
End: 2019-12-06
Attending: INTERNAL MEDICINE
Payer: COMMERCIAL

## 2019-12-06 ENCOUNTER — LAB ENCOUNTER (OUTPATIENT)
Dept: LAB | Age: 54
End: 2019-12-06
Attending: INTERNAL MEDICINE
Payer: COMMERCIAL

## 2019-12-06 DIAGNOSIS — N17.9 AKI (ACUTE KIDNEY INJURY) (HCC): ICD-10-CM

## 2019-12-06 PROCEDURE — 84080 ASSAY ALKALINE PHOSPHATASES: CPT

## 2019-12-06 PROCEDURE — 80048 BASIC METABOLIC PNL TOTAL CA: CPT

## 2019-12-06 PROCEDURE — 84075 ASSAY ALKALINE PHOSPHATASE: CPT

## 2019-12-06 PROCEDURE — 97110 THERAPEUTIC EXERCISES: CPT | Performed by: PHYSICAL THERAPIST

## 2019-12-06 PROCEDURE — 36415 COLL VENOUS BLD VENIPUNCTURE: CPT

## 2019-12-06 NOTE — PROGRESS NOTES
Date: 11/29/2019     Dx:  Nerve entrapment (G58.9)           Authorized # of Visits:  6       Referring MD: No ref.  provider found  Next MD visit: none scheduled    Medication Changes since last visit?: No    Subjective: Patient reports that she has less p stability exercises without pain in the in the (R) foot/hallux. Goals:   Goals     • Therapy Goals      (6 visits)  1. Patient to consistently perform their HEP and it's progression to maintain their improved condition.   2. Patient to have reduced and a

## 2019-12-09 ENCOUNTER — TELEPHONE (OUTPATIENT)
Dept: ENDOCRINOLOGY CLINIC | Facility: CLINIC | Age: 54
End: 2019-12-09

## 2019-12-09 ENCOUNTER — PATIENT MESSAGE (OUTPATIENT)
Dept: NEPHROLOGY | Facility: CLINIC | Age: 54
End: 2019-12-09

## 2019-12-09 ENCOUNTER — OFFICE VISIT (OUTPATIENT)
Dept: NEPHROLOGY | Facility: CLINIC | Age: 54
End: 2019-12-09
Payer: COMMERCIAL

## 2019-12-09 VITALS
HEART RATE: 80 BPM | BODY MASS INDEX: 49 KG/M2 | SYSTOLIC BLOOD PRESSURE: 133 MMHG | TEMPERATURE: 99 F | DIASTOLIC BLOOD PRESSURE: 81 MMHG | HEIGHT: 67 IN

## 2019-12-09 DIAGNOSIS — E05.90 HYPERTHYROIDISM: Primary | ICD-10-CM

## 2019-12-09 DIAGNOSIS — N17.9 AKI (ACUTE KIDNEY INJURY) (HCC): Primary | ICD-10-CM

## 2019-12-09 PROCEDURE — 99213 OFFICE O/P EST LOW 20 MIN: CPT | Performed by: INTERNAL MEDICINE

## 2019-12-09 NOTE — TELEPHONE ENCOUNTER
From: Tien Arteaga  To: Laura Castillo MD  Sent: 12/9/2019 5:07 PM CST  Subject: Test Results Question    Dr Joshua Weaver,     The lab states they have my original urine sample but wanted me to retest. Can you call Mouna at the lab [phone] to see if

## 2019-12-09 NOTE — TELEPHONE ENCOUNTER
Per patient request, I had sent my chart   She has read it, but no repsonse  Please call to see    [facility] you are doing well. I reviewed your thyroid labs. TSH is undeteectable now and free T3 hormone is high.    Hence, I think we will have to decide abou

## 2019-12-09 NOTE — PROGRESS NOTES
Progress Note:      Patient is a 47 yrs old female with pmh of HL, hyperthyroidism, obesity, herpes who presented for follow up     Lab test showed BUN/Cr 11/1.14 mg/dl with an eGFR 55 ml/min.  Creatinine between 0.7-0.8 mg/dl at baseline with an eGFR 9 Use      Smoking status: Never Smoker      Smokeless tobacco: Never Used    Alcohol use: No    Drug use: No       Medications (Active prior to today's visit):  Current Outpatient Medications   Medication Sig Dispense Refill   • Bepotastine Besilate (Doyle Marins OTHER (SEE COMMENTS)    Comment:mirgraine/headaches             mirgraine/headaches      ROS:     Constitutional:  Negative for decreased activity, fever  ENMT:  Negative for ear drainage, hearing loss and nasal drainage  Eyes:  Negative for eye dis bilateral normal kidneys with normal echotexture. +ve hepatic steatosis  - elevated alk phos - isoenzyme pending  - off of omeprazole, black cohosh, NSAIDs  - on valacyclovir for 5 yrs - cont. for now   - daily weights  - cont. hctz     2.  HTN:  - on hctz/tr

## 2019-12-10 NOTE — TELEPHONE ENCOUNTER
Okay to FU in 6 months, but please do blood work in the next two weeks  Free T4 and Free T3  She is starting to get hyperthyroid and I am concerned that hormones might go up more, hence close monitoring of labs is important  Also, call if she develops symp

## 2019-12-10 NOTE — TELEPHONE ENCOUNTER
Spoke with lab and they acknowledged that the urine test order is discontinued by Dr. Joshua Weaver.

## 2019-12-10 NOTE — TELEPHONE ENCOUNTER
Staff please call Yeni Wagner in the lab and let her know that urine protein and creatinine is not needed and I have dced the order

## 2019-12-10 NOTE — TELEPHONE ENCOUNTER
Left detailed voicemail (ok per verbal release) - including recommendation to repeat labs in 2 weeks and call if she has any s/sx of hyperthyroidism.

## 2019-12-10 NOTE — TELEPHONE ENCOUNTER
Pt states she would like to hold off on medication. Other than the occasional hoarseness in her voice and fatigue, she feels good. She would like to f/u in 6 months and if you have any questions or concerns please call her or [employer] message her.

## 2019-12-11 ENCOUNTER — TELEPHONE (OUTPATIENT)
Dept: OPTOMETRY | Facility: CLINIC | Age: 54
End: 2019-12-11

## 2019-12-13 ENCOUNTER — OFFICE VISIT (OUTPATIENT)
Dept: PHYSICAL THERAPY | Age: 54
End: 2019-12-13
Attending: PODIATRIST
Payer: COMMERCIAL

## 2019-12-13 DIAGNOSIS — G58.9 NERVE ENTRAPMENT: ICD-10-CM

## 2019-12-13 PROCEDURE — 97110 THERAPEUTIC EXERCISES: CPT | Performed by: PHYSICAL THERAPIST

## 2019-12-13 NOTE — PROGRESS NOTES
Date: 11/29/2019     Dx:  Nerve entrapment (G58.9)           Authorized # of Visits:  6       Referring MD: Halina Lundborg  Next MD visit: none scheduled    Medication Changes since last visit?: No    Subjective: Patient reports that she has less pain/ache in the more; NE       - return to standing; minimal ache (L) lateral buttock/hip    Sitting posture correction with lumbar roll x 10 mins; Dec, A              Assessment:   Krysten was complaining of her back and (L) thigh pain rated 7-8/10 stopping her from perfo

## 2019-12-16 ENCOUNTER — OFFICE VISIT (OUTPATIENT)
Dept: PHYSICAL THERAPY | Age: 54
End: 2019-12-16
Attending: PODIATRIST
Payer: COMMERCIAL

## 2019-12-16 DIAGNOSIS — G58.9 NERVE ENTRAPMENT: ICD-10-CM

## 2019-12-16 PROCEDURE — 97110 THERAPEUTIC EXERCISES: CPT | Performed by: PHYSICAL THERAPIST

## 2019-12-16 NOTE — PROGRESS NOTES
Date: 12/16/2019     Dx:  Nerve entrapment (G58.9)           Authorized # of Visits:  6       Referring MD: Wilder Mauricio MD visit: none scheduled    Medication Changes since last visit?: No    Subjective: Patient reports that she feels much better in the l - return to standing; minimal ache (L) lateral buttock/hip    Sitting posture correction with lumbar roll x 10 mins; Dec, A      Scifit UE only L5 x 5 mins ea fwd/bkwd; Dec, A (no more ache low back)    Pronelying x 2 min; NE     Sustained BEATRIZ x 2 mins; eccentric strengthening, and patient education. Charges:  TherEx x 4       Total Timed Treatment: 54 mins Total Treatment Time: 56 mins

## 2019-12-18 ENCOUNTER — HOSPITAL ENCOUNTER (OUTPATIENT)
Dept: ULTRASOUND IMAGING | Age: 54
Discharge: HOME | End: 2019-12-18
Attending: INTERNAL MEDICINE
Payer: COMMERCIAL

## 2019-12-18 ENCOUNTER — OFFICE VISIT (OUTPATIENT)
Dept: PHYSICAL THERAPY | Age: 54
End: 2019-12-18
Attending: PODIATRIST
Payer: COMMERCIAL

## 2019-12-18 DIAGNOSIS — E04.1 THYROID NODULE: ICD-10-CM

## 2019-12-18 DIAGNOSIS — G58.9 NERVE ENTRAPMENT: ICD-10-CM

## 2019-12-18 PROCEDURE — 76536 US EXAM OF HEAD AND NECK: CPT | Performed by: INTERNAL MEDICINE

## 2019-12-18 PROCEDURE — 97110 THERAPEUTIC EXERCISES: CPT | Performed by: PHYSICAL THERAPIST

## 2019-12-18 NOTE — PROGRESS NOTES
Date: 12/18/2019     Dx:  Nerve entrapment (G58.9)           Authorized # of Visits:  6       Referring MD: Caryn Valladares  Next MD visit: none scheduled    Medication Changes since last visit?: No    Subjective: Patient reports that she is does not have pain or s 20 reps    Shuttle: (R) heel raises 5b x 20 reps Pronelying x 2 mins; Dec, B    Extension in pronelying x 5 mins Dec, C to low back, A    REIL x 10 reps; P, NW mid back     + 10 reps more; NE       - return to standing; minimal ache (L) lateral buttock/hip and was unable to do her (L) foot/ankle exercises. She was reviewed her HEP and was instructed to continue performing them to maintain and further improve her condition. Goals: - MET  Goals     • Therapy Goals      (6 visits)  1.  Patient to consistent

## 2019-12-20 ENCOUNTER — APPOINTMENT (OUTPATIENT)
Dept: PHYSICAL THERAPY | Age: 54
End: 2019-12-20
Attending: PODIATRIST
Payer: COMMERCIAL

## 2020-01-08 DIAGNOSIS — M25.559 ARTHRALGIA OF HIP, UNSPECIFIED LATERALITY: ICD-10-CM

## 2020-01-08 DIAGNOSIS — M54.50 LOW BACK PAIN, UNSPECIFIED BACK PAIN LATERALITY, UNSPECIFIED CHRONICITY, UNSPECIFIED WHETHER SCIATICA PRESENT: Primary | ICD-10-CM

## 2020-01-30 ENCOUNTER — PATIENT MESSAGE (OUTPATIENT)
Dept: INTERNAL MEDICINE CLINIC | Facility: CLINIC | Age: 55
End: 2020-01-30

## 2020-01-31 RX ORDER — ALBUTEROL SULFATE 2.5 MG/3ML
2.5 SOLUTION RESPIRATORY (INHALATION) EVERY 6 HOURS PRN
Qty: 1 BOTTLE | Refills: 5 | Status: SHIPPED | OUTPATIENT
Start: 2020-01-31 | End: 2020-09-24

## 2020-01-31 RX ORDER — ALBUTEROL SULFATE 2.5 MG/3ML
SOLUTION RESPIRATORY (INHALATION) EVERY 6 HOURS PRN
COMMUNITY
End: 2020-01-31

## 2020-01-31 NOTE — TELEPHONE ENCOUNTER
From: Karla Cam  To: Meredith Michel MD  Sent: 1/30/2020 6:47 PM CST  Subject: Prescription Question    Following up on albuterol inhaler liquid for nebulizer. I'm having asthma attack.  Thanks

## 2020-02-05 ENCOUNTER — APPOINTMENT (OUTPATIENT)
Dept: PHYSICAL THERAPY | Age: 55
End: 2020-02-05
Attending: INTERNAL MEDICINE
Payer: COMMERCIAL

## 2020-02-07 ENCOUNTER — APPOINTMENT (OUTPATIENT)
Dept: PHYSICAL THERAPY | Age: 55
End: 2020-02-07
Attending: INTERNAL MEDICINE
Payer: COMMERCIAL

## 2020-02-09 ENCOUNTER — APPOINTMENT (OUTPATIENT)
Dept: LAB | Facility: HOSPITAL | Age: 55
End: 2020-02-09
Attending: INTERNAL MEDICINE
Payer: COMMERCIAL

## 2020-02-09 ENCOUNTER — TELEPHONE (OUTPATIENT)
Dept: ENDOCRINOLOGY CLINIC | Facility: CLINIC | Age: 55
End: 2020-02-09

## 2020-02-09 DIAGNOSIS — E05.90 HYPERTHYROIDISM: ICD-10-CM

## 2020-02-09 LAB
T3FREE SERPL-MCNC: 2.83 PG/ML (ref 2.4–4.2)
T4 FREE SERPL-MCNC: 1 NG/DL (ref 0.8–1.7)
TSI SER-ACNC: 0.04 MIU/ML (ref 0.36–3.74)

## 2020-02-09 PROCEDURE — 84439 ASSAY OF FREE THYROXINE: CPT

## 2020-02-09 PROCEDURE — 84443 ASSAY THYROID STIM HORMONE: CPT

## 2020-02-09 PROCEDURE — 84481 FREE ASSAY (FT-3): CPT

## 2020-02-09 PROCEDURE — 36415 COLL VENOUS BLD VENIPUNCTURE: CPT

## 2020-02-10 NOTE — TELEPHONE ENCOUNTER
Patient is on MMi 5 mg daily for hyperthyroidism.    Labs show normal thyroid hormones, TSH is low but better than before  CPM for now  FU in clinic in 6 -8 weeks ( patient had some insurance problems, please bok once sorted)  TSH, Free $ and Free T3 before

## 2020-02-10 NOTE — TELEPHONE ENCOUNTER
Called pt and gave message from AM . Pt will cont present management and will call for lab order before next  appt.

## 2020-02-12 ENCOUNTER — APPOINTMENT (OUTPATIENT)
Dept: PHYSICAL THERAPY | Age: 55
End: 2020-02-12
Attending: INTERNAL MEDICINE
Payer: COMMERCIAL

## 2020-02-14 ENCOUNTER — APPOINTMENT (OUTPATIENT)
Dept: PHYSICAL THERAPY | Age: 55
End: 2020-02-14
Attending: INTERNAL MEDICINE
Payer: COMMERCIAL

## 2020-02-18 ENCOUNTER — PATIENT MESSAGE (OUTPATIENT)
Dept: INTERNAL MEDICINE CLINIC | Facility: CLINIC | Age: 55
End: 2020-02-18

## 2020-02-18 RX ORDER — TRIAMTERENE AND HYDROCHLOROTHIAZIDE 37.5; 25 MG/1; MG/1
1 CAPSULE ORAL
Qty: 90 CAPSULE | Refills: 1 | Status: SHIPPED | OUTPATIENT
Start: 2020-02-18 | End: 2020-06-02

## 2020-02-18 RX ORDER — VALACYCLOVIR HYDROCHLORIDE 1 G/1
1 TABLET, FILM COATED ORAL DAILY
Qty: 90 TABLET | Refills: 4 | Status: SHIPPED | OUTPATIENT
Start: 2020-02-18 | End: 2021-06-10

## 2020-02-19 ENCOUNTER — APPOINTMENT (OUTPATIENT)
Dept: PHYSICAL THERAPY | Age: 55
End: 2020-02-19
Attending: INTERNAL MEDICINE
Payer: COMMERCIAL

## 2020-02-19 NOTE — TELEPHONE ENCOUNTER
Kenya Hammonds 2/18/2020 11:23 AM CST    PHARMACY IN .      ----- Message -----  From: Tish Valenzuela  Sent: 2/18/2020 11:14 AM CST  To: Jade Varela Clinical Staff  Subject: Prescription Question     Nando Joiner    I need refills and I have a new pharmacy

## 2020-02-21 ENCOUNTER — APPOINTMENT (OUTPATIENT)
Dept: PHYSICAL THERAPY | Age: 55
End: 2020-02-21
Attending: INTERNAL MEDICINE
Payer: COMMERCIAL

## 2020-02-26 ENCOUNTER — APPOINTMENT (OUTPATIENT)
Dept: PHYSICAL THERAPY | Age: 55
End: 2020-02-26
Attending: INTERNAL MEDICINE
Payer: COMMERCIAL

## 2020-02-26 ENCOUNTER — OFFICE VISIT (OUTPATIENT)
Dept: PODIATRY CLINIC | Facility: CLINIC | Age: 55
End: 2020-02-26
Payer: COMMERCIAL

## 2020-02-26 DIAGNOSIS — M21.611 BUNION OF GREAT TOE OF RIGHT FOOT: Primary | ICD-10-CM

## 2020-02-26 DIAGNOSIS — M20.41 HAMMER TOE OF RIGHT FOOT: ICD-10-CM

## 2020-02-26 NOTE — PROGRESS NOTES
HPI:    Patient ID: Kaila Reynolds is a 54year old female. This 51-year-old female presents with concerns that persist in reference to her right forefoot. I did bunion and judd surgery on this patient over a year ago.   She continues to have general Q4H x 7 days 1 Tube 0   • BREO ELLIPTA 100-25 MCG/INH Inhalation Aerosol Powder, Breath Activated INHALE 1 PUFF BY MOUTH EVERY DAY  1   • Cyclobenzaprine HCl 10 MG Oral Tab START WITH 1/2 TAB AT NIGHT IF TOLERATING CAN TAKE 1 TAB 2 TIMES DAILY AS NEEDED FO

## 2020-02-28 ENCOUNTER — APPOINTMENT (OUTPATIENT)
Dept: PHYSICAL THERAPY | Age: 55
End: 2020-02-28
Attending: INTERNAL MEDICINE
Payer: COMMERCIAL

## 2020-03-11 ENCOUNTER — PATIENT MESSAGE (OUTPATIENT)
Dept: ENDOCRINOLOGY CLINIC | Facility: CLINIC | Age: 55
End: 2020-03-11

## 2020-03-12 NOTE — TELEPHONE ENCOUNTER
From: Kavitha Calvo  To: Anibal Black MD  Sent: 3/11/2020 8:37 PM CDT  Subject: Prescription Question    May I get a 90 day supply refill of my   Methimazole? My new pharma is [address] in [address] [place] [phone]. I take 5mg daily.  I will g

## 2020-03-15 ENCOUNTER — PATIENT MESSAGE (OUTPATIENT)
Dept: INTERNAL MEDICINE CLINIC | Facility: CLINIC | Age: 55
End: 2020-03-15

## 2020-03-15 DIAGNOSIS — E04.1 THYROID NODULE: ICD-10-CM

## 2020-03-15 DIAGNOSIS — E05.90 HYPERTHYROIDISM: Primary | ICD-10-CM

## 2020-03-16 ENCOUNTER — APPOINTMENT (OUTPATIENT)
Dept: LAB | Age: 55
End: 2020-03-16
Attending: INTERNAL MEDICINE
Payer: COMMERCIAL

## 2020-03-16 ENCOUNTER — TELEPHONE (OUTPATIENT)
Dept: ENDOCRINOLOGY CLINIC | Facility: CLINIC | Age: 55
End: 2020-03-16

## 2020-03-16 ENCOUNTER — PATIENT MESSAGE (OUTPATIENT)
Dept: ENDOCRINOLOGY CLINIC | Facility: CLINIC | Age: 55
End: 2020-03-16

## 2020-03-16 DIAGNOSIS — E05.90 HYPERTHYROIDISM: ICD-10-CM

## 2020-03-16 DIAGNOSIS — E05.90 HYPERTHYROIDISM: Primary | ICD-10-CM

## 2020-03-16 LAB
T3FREE SERPL-MCNC: 2.33 PG/ML (ref 2.4–4.2)
T4 FREE SERPL-MCNC: 0.9 NG/DL (ref 0.8–1.7)
TSI SER-ACNC: 1.93 MIU/ML (ref 0.36–3.74)

## 2020-03-16 PROCEDURE — 84443 ASSAY THYROID STIM HORMONE: CPT

## 2020-03-16 PROCEDURE — 84439 ASSAY OF FREE THYROXINE: CPT

## 2020-03-16 PROCEDURE — 84481 FREE ASSAY (FT-3): CPT

## 2020-03-16 PROCEDURE — 36415 COLL VENOUS BLD VENIPUNCTURE: CPT

## 2020-03-16 RX ORDER — METHIMAZOLE 5 MG/1
5 TABLET ORAL DAILY
Qty: 90 TABLET | Refills: 0 | Status: SHIPPED | OUTPATIENT
Start: 2020-03-16 | End: 2020-09-04 | Stop reason: ALTCHOICE

## 2020-03-16 NOTE — TELEPHONE ENCOUNTER
Call transferred from the phone room. Patient is on the way to the lab and wants to make sure lab orders for her tyroid are in the system.   Chart reviewed and per encounter dated 02/09/20 patient is to repeat TSH, Free T4 and Free T3 in 6-8 weeks prior

## 2020-03-16 NOTE — TELEPHONE ENCOUNTER
Patient was contacted and accepted below appointment.     Future Appointments   Date Time Provider Cecile Nava   4/8/2020 11:15 AM Anitra Cheadle, MD [facility]

## 2020-03-16 NOTE — TELEPHONE ENCOUNTER
Refill snet so that she does not runt out  She should repeat labs ASAP.    Also, please schedule apt in the next 8-10 weeks  Thanks

## 2020-03-16 NOTE — TELEPHONE ENCOUNTER
Antwan Brock 3/16/2020 9:07 AM CDT      ----- Message -----  From: Sobeida Moody  Sent: 3/15/2020 12:06 PM CDT  To: Jade Varela Clinical Staff  Subject: Referral Kendra MANZANO    I have an HMO now and need referrals.  I need a referral to Dr Alfred Gitelman

## 2020-04-03 ENCOUNTER — TELEPHONE (OUTPATIENT)
Dept: ENDOCRINOLOGY CLINIC | Facility: CLINIC | Age: 55
End: 2020-04-03

## 2020-04-03 DIAGNOSIS — E05.90 HYPERTHYROIDISM: Primary | ICD-10-CM

## 2020-04-03 NOTE — TELEPHONE ENCOUNTER
Dr Freya Castillo pt has adam with you on 4/8   Please advise what labs needed. Are we going to [facility] Wednesday? Or are you doing tele health?

## 2020-04-03 NOTE — TELEPHONE ENCOUNTER
Dr. Jameel Rosenberg, we can convert appt scheduled on 4/8 to TeleHealth - patient inquiring about labs needed before appt? Please advise.

## 2020-04-03 NOTE — TELEPHONE ENCOUNTER
Per Dr. Madonna Stuart:     Not able to reply on previous encounter from today  Please schedule tele health visit  Thanks

## 2020-04-05 ENCOUNTER — OFFICE VISIT (OUTPATIENT)
Dept: FAMILY MEDICINE CLINIC | Facility: CLINIC | Age: 55
End: 2020-04-05

## 2020-04-05 VITALS
BODY MASS INDEX: 47.09 KG/M2 | RESPIRATION RATE: 18 BRPM | HEART RATE: 70 BPM | DIASTOLIC BLOOD PRESSURE: 76 MMHG | WEIGHT: 293 LBS | TEMPERATURE: 98 F | OXYGEN SATURATION: 97 % | SYSTOLIC BLOOD PRESSURE: 134 MMHG | HEIGHT: 66 IN

## 2020-04-05 DIAGNOSIS — J01.90 ACUTE BACTERIAL RHINOSINUSITIS: Primary | ICD-10-CM

## 2020-04-05 DIAGNOSIS — B96.89 ACUTE BACTERIAL RHINOSINUSITIS: Primary | ICD-10-CM

## 2020-04-05 DIAGNOSIS — I10 ESSENTIAL HYPERTENSION: ICD-10-CM

## 2020-04-05 PROCEDURE — 99202 OFFICE O/P NEW SF 15 MIN: CPT | Performed by: NURSE PRACTITIONER

## 2020-04-05 RX ORDER — AMOXICILLIN AND CLAVULANATE POTASSIUM 875; 125 MG/1; MG/1
1 TABLET, FILM COATED ORAL 2 TIMES DAILY
Qty: 14 TABLET | Refills: 0 | Status: SHIPPED | OUTPATIENT
Start: 2020-04-05 | End: 2020-04-12

## 2020-04-05 NOTE — PROGRESS NOTES
CHIEF COMPLAINT:   Patient presents with:  Sinus Problem      HPI:   Anne Casey is a 54year old female who presents for cold symptoms for 3- 4  weeks. Symptoms have progressed into sinus congestion and been worsening for past 2 weeks.  Sinus congestion • Colon adenomas 12/02/2019   • Colon polyp 2014   • Constipation 1965    Had it since childhood   • Disorder of thyroid    • Diverticular disease 2014   • Esophageal reflux 2005   • Essential hypertension    • Herpes    • High blood pressure    • High cho THROAT: oral mucosa pink, moist. No visible dental caries. Posterior pharynx is not erythematous. no exudates. +pnd. NECK: supple, non-tender  LUNGS: clear to auscultation bilaterally, no wheezes or rhonchi. Breathing is non labored.   CARDIO: RRR without · Use humidified air, steamy showers/baths and use vaporizer in sleeping quarters to keep secretions thin.   Symptom management:    · Nasal congestion/Post-nasal-drip: Saline nasal spray to nostrils to help remove drainage or an antihistamine to help dry up Applying heat to the area surrounding your sinuses may make you feel more comfortable. Use a hot water bottle or a hand towel dipped in hot water. Some people also find ice packs effective for relieving pain.   Medicines  Your doctor may prescribe medicatio · Heat may help soothe painful areas of your face. Use a towel soaked in hot water. Or,  the shower and direct the warm spray onto your face.  Using a vaporizer along with a menthol rub at night may also help soothe symptoms.   · An expectorant with · Swelling of your forehead or eyelids  · Vision problems, such as blurred or double vision  · Fever of 100.4ºF (38ºC) or higher, or as directed by your healthcare provider  · Seizure  · Breathing problems  · Symptoms don't go away in 10 days  Prevention

## 2020-04-06 ENCOUNTER — TELEPHONE (OUTPATIENT)
Dept: INTERNAL MEDICINE CLINIC | Facility: CLINIC | Age: 55
End: 2020-04-06

## 2020-04-06 ENCOUNTER — APPOINTMENT (OUTPATIENT)
Dept: LAB | Age: 55
End: 2020-04-06
Attending: INTERNAL MEDICINE
Payer: COMMERCIAL

## 2020-04-06 DIAGNOSIS — E05.90 HYPERTHYROIDISM: ICD-10-CM

## 2020-04-06 PROCEDURE — 84439 ASSAY OF FREE THYROXINE: CPT

## 2020-04-06 PROCEDURE — 84481 FREE ASSAY (FT-3): CPT

## 2020-04-06 PROCEDURE — 36415 COLL VENOUS BLD VENIPUNCTURE: CPT

## 2020-04-06 PROCEDURE — 84443 ASSAY THYROID STIM HORMONE: CPT

## 2020-04-06 NOTE — TELEPHONE ENCOUNTER
Dr. Radha Lennon,     Per patient she prefers to do labs prior to the televisit because she would like to go over the results with you and talk about the symptoms she's been having (eyes sensitive to phone/laptop).   She said she is not agreeable in paying $40

## 2020-04-06 NOTE — TELEPHONE ENCOUNTER
Patient was contacted and informed her that lab orders have been placed for her and can do them now at [facility], non-fasting.     Patient also confirmed her appointment this week, she is okay to doing telehealth as she called her insurance and was told it i

## 2020-04-07 ENCOUNTER — PATIENT MESSAGE (OUTPATIENT)
Dept: ENDOCRINOLOGY CLINIC | Facility: CLINIC | Age: 55
End: 2020-04-07

## 2020-04-07 NOTE — TELEPHONE ENCOUNTER
Labs resulted from 4/6/2020. Will inform patient that they will be discussed then released during [facility].  FYI to Dr. Marcos Vieyra

## 2020-04-07 NOTE — TELEPHONE ENCOUNTER
Results released to [facility] and patient was informed via [facility] [address].

## 2020-04-07 NOTE — TELEPHONE ENCOUNTER
Please see patients  msg. She wants labs from 4/6/2020 released before TeleHealth appointment tomorrow. Please advise.

## 2020-04-07 NOTE — TELEPHONE ENCOUNTER
From: Lorena Fraizer  To: Yenni Holly MD  Sent: 4/7/2020 2:55 PM CDT  Subject: Test Results Question    Hi Dr Parminder Godoy,     I am schedule to have a tele health appt with you tomorrow. I do not see the bloodwork test result from yesterday in My Chart.

## 2020-04-08 ENCOUNTER — VIRTUAL PHONE E/M (OUTPATIENT)
Dept: ENDOCRINOLOGY CLINIC | Facility: CLINIC | Age: 55
End: 2020-04-08

## 2020-04-08 DIAGNOSIS — E05.90 HYPERTHYROIDISM: Primary | ICD-10-CM

## 2020-04-08 PROCEDURE — 99213 OFFICE O/P EST LOW 20 MIN: CPT | Performed by: INTERNAL MEDICINE

## 2020-04-08 NOTE — PROGRESS NOTES
Virtual/Telephone Check-In    Karla Cam verbally consents to a Virtual/Telephone Check-In service on 04/08/20. Patient understands and accepts financial responsibility for any deductible, co-insurance and/or co-pays associated with this service.     D could be related to chronic thyroiditis.       Patient verbalized a complete  understanding of all of the above and did not have any further questions.

## 2020-05-29 ENCOUNTER — APPOINTMENT (OUTPATIENT)
Dept: LAB | Age: 55
End: 2020-05-29
Attending: INTERNAL MEDICINE
Payer: COMMERCIAL

## 2020-05-29 DIAGNOSIS — E05.90 HYPERTHYROIDISM: ICD-10-CM

## 2020-05-29 PROCEDURE — 36415 COLL VENOUS BLD VENIPUNCTURE: CPT

## 2020-05-29 PROCEDURE — 84443 ASSAY THYROID STIM HORMONE: CPT

## 2020-05-29 PROCEDURE — 84439 ASSAY OF FREE THYROXINE: CPT

## 2020-05-29 PROCEDURE — 84481 FREE ASSAY (FT-3): CPT

## 2020-05-30 ENCOUNTER — TELEPHONE (OUTPATIENT)
Dept: ENDOCRINOLOGY CLINIC | Facility: CLINIC | Age: 55
End: 2020-05-30

## 2020-05-31 NOTE — TELEPHONE ENCOUNTER
Patient canceled apt  LOV was nov 2019  Needs apt to review labs and decide on further management  I can do a virtual visit on wed if she likes  Thanks

## 2020-06-02 RX ORDER — TRIAMTERENE AND HYDROCHLOROTHIAZIDE 37.5; 25 MG/1; MG/1
1 CAPSULE ORAL
Qty: 90 CAPSULE | Refills: 1 | Status: SHIPPED | OUTPATIENT
Start: 2020-06-02 | End: 2020-10-16

## 2020-06-03 ENCOUNTER — VIRTUAL PHONE E/M (OUTPATIENT)
Dept: ENDOCRINOLOGY CLINIC | Facility: CLINIC | Age: 55
End: 2020-06-03

## 2020-06-03 DIAGNOSIS — E05.90 HYPERTHYROIDISM: Primary | ICD-10-CM

## 2020-06-03 PROCEDURE — 99213 OFFICE O/P EST LOW 20 MIN: CPT | Performed by: INTERNAL MEDICINE

## 2020-06-03 NOTE — PROGRESS NOTES
Sara Braswell verbally consents to a phone  visit on 6/4/2020     Patient understands and accepts financial responsibility for any deductible, co-insurance and/or co-pays associated with this service. Patient has been referred to the [facility] website at www.  Oral Tab Take 800 mg by mouth. • Albuterol Sulfate HFA (PROVENTIL HFA) 108 (90 Base) MCG/ACT Inhalation Aero Soln Inhale into the lungs.            ALLERGY:    Codeine                 JITTERY, OTHER (SEE COMMENTS)  Nitroglycerin           OTHER (SEE COM and Free T3  - Will plan to stop MMI at esha ttime and follow labs  - Knows to call if she has symptoms of hyperthyroidism  - She will think about PAZ if she gets hyperthyroid again    2.  Cold nodule on NM thyroid scan  No nodule on repeat US         [place]

## 2020-06-14 DIAGNOSIS — J34.89 SINUS PRESSURE: Primary | ICD-10-CM

## 2020-06-17 ENCOUNTER — OFFICE VISIT (OUTPATIENT)
Dept: OTOLARYNGOLOGY | Facility: CLINIC | Age: 55
End: 2020-06-17

## 2020-06-17 ENCOUNTER — OFFICE VISIT (OUTPATIENT)
Dept: AUDIOLOGY | Facility: CLINIC | Age: 55
End: 2020-06-17

## 2020-06-17 VITALS
DIASTOLIC BLOOD PRESSURE: 85 MMHG | WEIGHT: 293 LBS | HEIGHT: 67 IN | TEMPERATURE: 98 F | SYSTOLIC BLOOD PRESSURE: 135 MMHG | BODY MASS INDEX: 45.99 KG/M2

## 2020-06-17 DIAGNOSIS — H90.3 SENSORINEURAL HEARING LOSS, BILATERAL: Primary | ICD-10-CM

## 2020-06-17 DIAGNOSIS — F45.8 BRUXISM (TEETH GRINDING): ICD-10-CM

## 2020-06-17 DIAGNOSIS — H92.03 REFERRED OTALGIA OF BOTH EARS: ICD-10-CM

## 2020-06-17 DIAGNOSIS — H90.3 SNHL (SENSORY-NEURAL HEARING LOSS), ASYMMETRICAL: Primary | ICD-10-CM

## 2020-06-17 PROCEDURE — 99244 OFF/OP CNSLTJ NEW/EST MOD 40: CPT | Performed by: OTOLARYNGOLOGY

## 2020-06-17 PROCEDURE — 92557 COMPREHENSIVE HEARING TEST: CPT | Performed by: AUDIOLOGIST

## 2020-06-17 PROCEDURE — 92567 TYMPANOMETRY: CPT | Performed by: AUDIOLOGIST

## 2020-06-17 RX ORDER — PREDNISONE 20 MG/1
TABLET ORAL
Qty: 9 TABLET | Refills: 0 | Status: ON HOLD | OUTPATIENT
Start: 2020-06-17 | End: 2020-07-19

## 2020-06-17 NOTE — PROGRESS NOTES
AUDIOLOGY REPORT      Contreras Mcmahon is a 54year old female     Referring Provider: Jorge Hopkins   YOB: 1965  Medical Record: [id number]      Patient Hearing History:   Patient reported left ear pain/pressure.       Otoscopic Inspection:  [employer]

## 2020-06-17 NOTE — PROGRESS NOTES
Keven Aden is a 54year old female.  Patient presents with:  Sinus Problem: sinus congestion for 3 months  Ear Problem: left ear and pressure for a year      HISTORY OF PRESENT ILLNESS  6/17/2020  Patient  presents with popping and pressure in her ears file        Relationship status: Not on file      Intimate partner violence:        Fear of current or ex partner: Not on file        Emotionally abused: Not on file        Physically abused: Not on file        Forced sexual activity: Not on file    Other Integumentary Negative Frequent skin infections, pigment change and rash. Hema/Lymph Negative Easy bleeding and easy bruising. PHYSICAL EXAM    /85   Temp 98.4 °F (36.9 °C) (Tympanic)   Ht 5' 7\" (1.702 m)   Wt (!) 309 lb (140.2 kg)   BMI 48. valACYclovir HCl 1 G Oral Tab, Take 1 tablet (1,000 mg total) by mouth daily. , Disp: 90 tablet, Rfl: 4  •  betamethasone valerate 0.1 % External Cream, Apply 1 Application topically 2 (two) times daily. , Disp: 45 g, Rfl: 0  •  albuterol sulfate (2.5 MG/3ML not medically contraindicated), heating pad for two weeks and as needed, and purchasing a . Consider seeing dentist for custom  if pain persists in spite of these measures.       Denise Keenan MD    6/17/2020    12:45 PM

## 2020-07-07 ENCOUNTER — APPOINTMENT (OUTPATIENT)
Dept: PHYSICAL THERAPY | Facility: HOSPITAL | Age: 55
End: 2020-07-07
Attending: INTERNAL MEDICINE

## 2020-07-09 ENCOUNTER — APPOINTMENT (OUTPATIENT)
Dept: PHYSICAL THERAPY | Facility: HOSPITAL | Age: 55
End: 2020-07-09
Attending: INTERNAL MEDICINE

## 2020-07-14 ENCOUNTER — TELEPHONE (OUTPATIENT)
Dept: INTERNAL MEDICINE CLINIC | Facility: CLINIC | Age: 55
End: 2020-07-14

## 2020-07-14 NOTE — TELEPHONE ENCOUNTER
Pt was exposed to Covid over the 4th of July and is now having a fever and body aches. Pt wants to know if she should take IB Profen or Tylenol. Please advise.

## 2020-07-14 NOTE — TELEPHONE ENCOUNTER
Pt notified to take tylenol per Dr. Leon Bonds, pt verbalized understanding, pt went to get tested  today, will call office to give results once she finds out.  Denied further questions

## 2020-07-15 ENCOUNTER — PATIENT MESSAGE (OUTPATIENT)
Dept: INTERNAL MEDICINE CLINIC | Facility: CLINIC | Age: 55
End: 2020-07-15

## 2020-07-15 DIAGNOSIS — J45.909 UNCOMPLICATED ASTHMA, UNSPECIFIED ASTHMA SEVERITY, UNSPECIFIED WHETHER PERSISTENT: Primary | ICD-10-CM

## 2020-07-15 NOTE — TELEPHONE ENCOUNTER
Bo Lakhani 7/15/2020 1:20 PM CDT      ----- Message -----  From: Angelica Skelton  Sent: 7/15/2020 11:42 AM CDT  To: Jade Varela Clinical Staff  Subject: Referral Request     Nando Miller    My insurance no longer covers my Pulmonary specialist at [facility].  C

## 2020-07-19 ENCOUNTER — HOSPITAL ENCOUNTER (INPATIENT)
Facility: HOSPITAL | Age: 55
LOS: 6 days | Discharge: HOME | DRG: 177 | End: 2020-07-25
Attending: EMERGENCY MEDICINE | Admitting: HOSPITALIST
Payer: COMMERCIAL

## 2020-07-19 ENCOUNTER — APPOINTMENT (OUTPATIENT)
Dept: CT IMAGING | Facility: HOSPITAL | Age: 55
DRG: 177 | End: 2020-07-19
Attending: EMERGENCY MEDICINE
Payer: COMMERCIAL

## 2020-07-19 ENCOUNTER — APPOINTMENT (OUTPATIENT)
Dept: GENERAL RADIOLOGY | Facility: HOSPITAL | Age: 55
DRG: 177 | End: 2020-07-19
Attending: EMERGENCY MEDICINE
Payer: COMMERCIAL

## 2020-07-19 DIAGNOSIS — R09.02 HYPOXIA: ICD-10-CM

## 2020-07-19 DIAGNOSIS — Z20.822 SUSPECTED COVID-19 VIRUS INFECTION: Primary | ICD-10-CM

## 2020-07-19 LAB
ALBUMIN SERPL-MCNC: 3.4 G/DL (ref 3.4–5)
ALP LIVER SERPL-CCNC: 129 U/L (ref 41–108)
ALT SERPL-CCNC: 18 U/L (ref 13–56)
ANION GAP SERPL CALC-SCNC: 4 MMOL/L (ref 0–18)
AST SERPL-CCNC: 20 U/L (ref 15–37)
BASOPHILS # BLD AUTO: 0.02 X10(3) UL (ref 0–0.2)
BASOPHILS NFR BLD AUTO: 0.5 %
BILIRUB DIRECT SERPL-MCNC: 0.1 MG/DL (ref 0–0.2)
BILIRUB SERPL-MCNC: 0.3 MG/DL (ref 0.1–2)
BUN BLD-MCNC: 9 MG/DL (ref 7–18)
BUN/CREAT SERPL: 8.7 (ref 10–20)
CALCIUM BLD-MCNC: 8.5 MG/DL (ref 8.5–10.1)
CHLORIDE SERPL-SCNC: 99 MMOL/L (ref 98–112)
CO2 SERPL-SCNC: 29 MMOL/L (ref 21–32)
CREAT BLD-MCNC: 1.03 MG/DL (ref 0.55–1.02)
CRP SERPL HS-MCNC: 65 MG/L (ref ?–3)
D DIMER PPP FEU-MCNC: 9.21 UG/ML FEU (ref ?–0.55)
DEPRECATED HBV CORE AB SER IA-ACNC: 211.2 NG/ML (ref 18–340)
DEPRECATED RDW RBC AUTO: 44.8 FL (ref 35.1–46.3)
EOSINOPHIL # BLD AUTO: 0 X10(3) UL (ref 0–0.7)
EOSINOPHIL NFR BLD AUTO: 0 %
ERYTHROCYTE [DISTWIDTH] IN BLOOD BY AUTOMATED COUNT: 13.2 % (ref 11–15)
GLUCOSE BLD-MCNC: 104 MG/DL (ref 70–99)
HCT VFR BLD AUTO: 41.3 % (ref 35–48)
HGB BLD-MCNC: 13.9 G/DL (ref 12–16)
IMM GRANULOCYTES # BLD AUTO: 0.01 X10(3) UL (ref 0–1)
IMM GRANULOCYTES NFR BLD: 0.3 %
LDH SERPL L TO P-CCNC: 253 U/L
LYMPHOCYTES # BLD AUTO: 1.49 X10(3) UL (ref 1–4)
LYMPHOCYTES NFR BLD AUTO: 39.7 %
M PROTEIN MFR SERPL ELPH: 7.9 G/DL (ref 6.4–8.2)
MCH RBC QN AUTO: 30.8 PG (ref 26–34)
MCHC RBC AUTO-ENTMCNC: 33.7 G/DL (ref 31–37)
MCV RBC AUTO: 91.6 FL (ref 80–100)
MONOCYTES # BLD AUTO: 0.43 X10(3) UL (ref 0.1–1)
MONOCYTES NFR BLD AUTO: 11.5 %
NEUTROPHILS # BLD AUTO: 1.8 X10 (3) UL (ref 1.5–7.7)
NEUTROPHILS # BLD AUTO: 1.8 X10(3) UL (ref 1.5–7.7)
NEUTROPHILS NFR BLD AUTO: 48 %
OSMOLALITY SERPL CALC.SUM OF ELEC: 273 MOSM/KG (ref 275–295)
PLATELET # BLD AUTO: 216 10(3)UL (ref 150–450)
POTASSIUM SERPL-SCNC: 3.3 MMOL/L (ref 3.5–5.1)
RBC # BLD AUTO: 4.51 X10(6)UL (ref 3.8–5.3)
SARS-COV-2 RNA RESP QL NAA+PROBE: NOT DETECTED
SODIUM SERPL-SCNC: 132 MMOL/L (ref 136–145)
TROPONIN I SERPL-MCNC: <0.045 NG/ML (ref ?–0.04)
WBC # BLD AUTO: 3.8 X10(3) UL (ref 4–11)

## 2020-07-19 PROCEDURE — 99254 IP/OBS CNSLTJ NEW/EST MOD 60: CPT | Performed by: INTERNAL MEDICINE

## 2020-07-19 PROCEDURE — 71045 X-RAY EXAM CHEST 1 VIEW: CPT | Performed by: EMERGENCY MEDICINE

## 2020-07-19 PROCEDURE — 71260 CT THORAX DX C+: CPT | Performed by: EMERGENCY MEDICINE

## 2020-07-19 PROCEDURE — 99223 1ST HOSP IP/OBS HIGH 75: CPT | Performed by: HOSPITALIST

## 2020-07-19 RX ORDER — LORAZEPAM 2 MG/ML
1 INJECTION INTRAMUSCULAR EVERY 6 HOURS PRN
Status: DISCONTINUED | OUTPATIENT
Start: 2020-07-19 | End: 2020-07-25

## 2020-07-19 RX ORDER — CODEINE PHOSPHATE AND GUAIFENESIN 10; 100 MG/5ML; MG/5ML
5 SOLUTION ORAL EVERY 4 HOURS PRN
Status: DISCONTINUED | OUTPATIENT
Start: 2020-07-19 | End: 2020-07-25

## 2020-07-19 RX ORDER — HEPARIN SODIUM 5000 [USP'U]/ML
7500 INJECTION, SOLUTION INTRAVENOUS; SUBCUTANEOUS EVERY 12 HOURS SCHEDULED
Status: DISCONTINUED | OUTPATIENT
Start: 2020-07-20 | End: 2020-07-20

## 2020-07-19 RX ORDER — PANTOPRAZOLE SODIUM 40 MG/1
40 TABLET, DELAYED RELEASE ORAL
Status: DISCONTINUED | OUTPATIENT
Start: 2020-07-20 | End: 2020-07-25

## 2020-07-19 RX ORDER — SODIUM CHLORIDE AND POTASSIUM CHLORIDE .9; .15 G/100ML; G/100ML
SOLUTION INTRAVENOUS CONTINUOUS
Status: DISCONTINUED | OUTPATIENT
Start: 2020-07-19 | End: 2020-07-22

## 2020-07-19 RX ORDER — POTASSIUM CHLORIDE 20 MEQ/1
40 TABLET, EXTENDED RELEASE ORAL ONCE
Status: COMPLETED | OUTPATIENT
Start: 2020-07-19 | End: 2020-07-19

## 2020-07-19 RX ORDER — ACETAMINOPHEN 325 MG/1
650 TABLET ORAL EVERY 6 HOURS PRN
Status: DISCONTINUED | OUTPATIENT
Start: 2020-07-19 | End: 2020-07-25

## 2020-07-19 RX ORDER — HYDRALAZINE HYDROCHLORIDE 20 MG/ML
10 INJECTION INTRAMUSCULAR; INTRAVENOUS EVERY 4 HOURS PRN
Status: DISCONTINUED | OUTPATIENT
Start: 2020-07-19 | End: 2020-07-25

## 2020-07-19 RX ORDER — ALBUTEROL SULFATE 90 UG/1
2 AEROSOL, METERED RESPIRATORY (INHALATION) EVERY 6 HOURS PRN
Status: DISCONTINUED | OUTPATIENT
Start: 2020-07-19 | End: 2020-07-25

## 2020-07-19 RX ORDER — ENOXAPARIN SODIUM 100 MG/ML
0.6 INJECTION SUBCUTANEOUS ONCE
Status: COMPLETED | OUTPATIENT
Start: 2020-07-19 | End: 2020-07-19

## 2020-07-19 RX ORDER — ONDANSETRON 2 MG/ML
4 INJECTION INTRAMUSCULAR; INTRAVENOUS EVERY 6 HOURS PRN
Status: DISCONTINUED | OUTPATIENT
Start: 2020-07-19 | End: 2020-07-25

## 2020-07-19 RX ORDER — METHIMAZOLE 5 MG/1
5 TABLET ORAL DAILY
Status: DISCONTINUED | OUTPATIENT
Start: 2020-07-20 | End: 2020-07-25

## 2020-07-19 RX ORDER — ACETAMINOPHEN 500 MG
1000 TABLET ORAL EVERY 6 HOURS PRN
COMMUNITY

## 2020-07-19 RX ORDER — ZOLPIDEM TARTRATE 5 MG/1
5 TABLET ORAL NIGHTLY PRN
Status: DISCONTINUED | OUTPATIENT
Start: 2020-07-19 | End: 2020-07-25

## 2020-07-19 NOTE — ED NOTES
Pt to er from home with c/o being exposed to her cousin who tested positive on the 4th of July. Pt states she has been having some chills, cough, sob and diarrhea. What made her come in today was the increased SOB.  Pt has hx of asthma, but has been getting

## 2020-07-19 NOTE — ED PROVIDER NOTES
Patient Seen in: [facility] AND [facility] Emergency Department    History   Patient presents with:  Dyspnea SHELLEY SOB    Stated Complaint: Chest tightness, fatigue    HPI  72-year-old female with hypertension, hyperlipidemia, obesity, presenting for evaluation of mouth once daily. methimazole 5 MG Oral Tab,  Take 1 tablet (5 mg total) by mouth daily. valACYclovir HCl 1 G Oral Tab,  Take 1 tablet (1,000 mg total) by mouth daily.    betamethasone valerate 0.1 % External Cream,  Apply 1 Application topically 2 (two Well-appearing, speaking in full sentences, no acute distress  HENT: mmm, no lesions,  Neck: normal range of motion, no tenderness, supple. Eyes: PERRL, EOMI, conjunctiva normal, no discharge. Sclera anicteric.   Cardiovascular: Regular rate rhythm, normal Narrative: The following orders were created for panel order CBC WITH DIFFERENTIAL WITH PLATELET.   Procedure                               Abnormality         Status                     ---------                               -----------         ------ manipulation) none  High Risk Pain Features (Severe.  Abrupt Ripping or tearing) none  High Risk Exam Features (pulse deficit, BP difference, focal neuro deficit,murmur of aortic insufficiency, hypotension or shock) none         MDM     Laboratory studies a

## 2020-07-19 NOTE — ED INITIAL ASSESSMENT (HPI)
Patient was exposed with COVID 2 weeks ago. Patient had testing on Tuesday but no results. Patient has hx of asthma. Patient having increased SOB, diarrhea.

## 2020-07-20 LAB
ANION GAP SERPL CALC-SCNC: 4 MMOL/L (ref 0–18)
BASOPHILS # BLD AUTO: 0.01 X10(3) UL (ref 0–0.2)
BASOPHILS NFR BLD AUTO: 0.2 %
BUN BLD-MCNC: 8 MG/DL (ref 7–18)
BUN/CREAT SERPL: 9.6 (ref 10–20)
C DIFF TOX B STL QL: NEGATIVE
CALCIUM BLD-MCNC: 8.2 MG/DL (ref 8.5–10.1)
CHLORIDE SERPL-SCNC: 105 MMOL/L (ref 98–112)
CO2 SERPL-SCNC: 29 MMOL/L (ref 21–32)
CREAT BLD-MCNC: 0.83 MG/DL (ref 0.55–1.02)
CRP SERPL-MCNC: 6.73 MG/DL (ref ?–0.3)
D DIMER PPP FEU-MCNC: 9.01 UG/ML FEU (ref ?–0.55)
DEPRECATED HBV CORE AB SER IA-ACNC: 217.6 NG/ML (ref 18–340)
DEPRECATED RDW RBC AUTO: 44 FL (ref 35.1–46.3)
EOSINOPHIL # BLD AUTO: 0 X10(3) UL (ref 0–0.7)
EOSINOPHIL NFR BLD AUTO: 0 %
ERYTHROCYTE [DISTWIDTH] IN BLOOD BY AUTOMATED COUNT: 13.3 % (ref 11–15)
GLUCOSE BLD-MCNC: 98 MG/DL (ref 70–99)
HCT VFR BLD AUTO: 38.1 % (ref 35–48)
HGB BLD-MCNC: 13 G/DL (ref 12–16)
IMM GRANULOCYTES # BLD AUTO: 0.01 X10(3) UL (ref 0–1)
IMM GRANULOCYTES NFR BLD: 0.2 %
LDH SERPL L TO P-CCNC: 262 U/L
LYMPHOCYTES # BLD AUTO: 1.01 X10(3) UL (ref 1–4)
LYMPHOCYTES NFR BLD AUTO: 21.8 %
MCH RBC QN AUTO: 31 PG (ref 26–34)
MCHC RBC AUTO-ENTMCNC: 34.1 G/DL (ref 31–37)
MCV RBC AUTO: 90.9 FL (ref 80–100)
MONOCYTES # BLD AUTO: 0.37 X10(3) UL (ref 0.1–1)
MONOCYTES NFR BLD AUTO: 8 %
NEUTROPHILS # BLD AUTO: 3.24 X10 (3) UL (ref 1.5–7.7)
NEUTROPHILS # BLD AUTO: 3.24 X10(3) UL (ref 1.5–7.7)
NEUTROPHILS NFR BLD AUTO: 69.8 %
OSMOLALITY SERPL CALC.SUM OF ELEC: 284 MOSM/KG (ref 275–295)
PLATELET # BLD AUTO: 219 10(3)UL (ref 150–450)
POTASSIUM SERPL-SCNC: 4.2 MMOL/L (ref 3.5–5.1)
PROCALCITONIN SERPL-MCNC: <0.05 NG/ML (ref ?–0.16)
RBC # BLD AUTO: 4.19 X10(6)UL (ref 3.8–5.3)
SARS-COV-2 RNA RESP QL NAA+PROBE: DETECTED
SODIUM SERPL-SCNC: 138 MMOL/L (ref 136–145)
WBC # BLD AUTO: 4.6 X10(3) UL (ref 4–11)

## 2020-07-20 PROCEDURE — 99233 SBSQ HOSP IP/OBS HIGH 50: CPT | Performed by: INTERNAL MEDICINE

## 2020-07-20 PROCEDURE — 99232 SBSQ HOSP IP/OBS MODERATE 35: CPT | Performed by: INTERNAL MEDICINE

## 2020-07-20 RX ORDER — ENOXAPARIN SODIUM 100 MG/ML
0.6 INJECTION SUBCUTANEOUS EVERY 12 HOURS SCHEDULED
Status: DISCONTINUED | OUTPATIENT
Start: 2020-07-20 | End: 2020-07-20

## 2020-07-20 RX ORDER — ENOXAPARIN SODIUM 100 MG/ML
40 INJECTION SUBCUTANEOUS EVERY 12 HOURS SCHEDULED
Status: DISCONTINUED | OUTPATIENT
Start: 2020-07-20 | End: 2020-07-25

## 2020-07-20 NOTE — RESPIRATORY THERAPY NOTE
LACEY ASSESSMENT:    Pt does not have a previous diagnosis of LACEY. Pt does not routinely use a CPAP device at home. This pt is suspected to be at high risk for LACEY and sleep lab packet was provided to patient for outpatient follow-up.

## 2020-07-20 NOTE — ED NOTES
Orders for admission, patient is aware of plan and ready to go upstairs. Any questions, please call ED JANAK Bell  at extension [phone].

## 2020-07-20 NOTE — CONSULTS
[facility]    Report of Consultation    Whitney Devine Patient Status:  Emergency    1965 MRN [id number]   Location [address] Attending Srirma Gross MD   Hosp Day # 0 PCP Gladys Hawkins MD     Date of Guardians:  Not on file    Other Topics            Concern    None on file    Social History Narrative    None on file            Current Medications:  Fluticasone Furoate-Vilanterol (BREO ELLIPTA) 100-25 MCG/INH inhaler 1 puff, 1 puff, Inhalation, Daily DDIMER 9.21 (H) 07/19/2020    ESRML 49 (H) 04/06/2019    CRP 0.86 (H) 04/06/2019    TROP <0.045 07/19/2020    CK 44 04/11/2019    B12 562 04/11/2019         Imaging  Xr Chest Ap Portable  (cpt=71045)    Result Date: 7/19/2020  CONCLUSION:  1.  Right upper l

## 2020-07-20 NOTE — PROGRESS NOTES
[facility]     Progress Note        Joanie Carson Patient Status:  Inpatient    1965 MRN [id number]   Location [address] [facility] Attending Raffy Masters MD   Hosp Day # 1 PCP Anabela Krishnan MD       Subjective:   Patient see S2  Respiratory: clear to auscultation bilaterally, no wheezing, rales, rhonchi, crackles  GI: abdomen soft, non tender  Extremities: no clubbing, cyanosis, edema  Neurologic: no gross motor deficits  Skin: warm, dry      Results:     Lab Results   Compone at 17:07 by Black Valdez MD      Assessment   1. Acute respiratory distress  2. Groundglass lung opacities  3. Leukopenia  4. Elevated d-dimer  5.   Asthma     Plan   -Patient presents with 7-day onset of fevers, significant worsening dyspnea over the

## 2020-07-20 NOTE — PLAN OF CARE
Admitted from ED with c/o sob, chest tightness and fatigue. Troponin x1 negative. DDimer elevated. Rapid covid negative. Covid PCR pending. CXR - covid vs PNA. IVF and IV antibiotics. Pt states all symptoms have resolved. On 1L O2 NC.  Will attempt to wean need for suctioning and perform as needed  - Assess and instruct to report SOB or any respiratory difficulty  - Respiratory Therapy support as indicated  - Manage/alleviate anxiety  - Monitor for signs/symptoms of CO2 retention  Outcome: Progressing

## 2020-07-20 NOTE — ED NOTES
Ambulated pt around the pod to determine saturation level. Pt's O2 dropped to 84% great wave form. Pt has been placed on 2L supplemental oxygen. Now at 94%. MD notified.

## 2020-07-20 NOTE — H&P
[facility] Patient Status:  Emergency    1965 MRN [id number]   Location [address] Attending Tahira Valenzuela MD   Hosp Day # 0 PCP Olivia Dukes MD     Date:   reports that she has never smoked. She has never used smokeless tobacco. She reports that she does not drink alcohol or use drugs.     Allergies:    Codeine                 JITTERY, OTHER (SEE COMMENTS)  Nitroglycerin           OTHER (SEE COMMENTS)    Shavon Negative. Neurologic:  Negative. Psychiatric:  Negative.   ROS reviewed as documented in chart    Physical Exam:  Temp:  [97.9 °F (36.6 °C)-98.5 °F (36.9 °C)] 97.9 °F (36.6 °C)  Pulse:  [69-80] 69  Resp:  [14-26] 14  BP: (135-149)/(79-85) 149/84    Genera Findings consistent with right-sided pneumonitis versus atypical viral pneumonia    Dictated by (CST): Ryan Ellis MD on 7/19/2020 at 5:31 PM     Finalized by (CST): Ryan Ellis MD on 7/19/2020 at 5:33 PM          Ct Chest Pe Aorta (iv Only

## 2020-07-20 NOTE — PROGRESS NOTES
[facility]    [facility] Note    Gopi Phillip Patient Status:  Inpatient    1965 MRN [id number]   Location [facility] Attending Kevin Watson MD   Hosp Day # 1 PCP Aggie Kelly MD       Subjective:   No acute events mg Oral QAM AC       Current PRN Inpatient Meds:      Albuterol Sulfate HFA, ondansetron HCl, acetaminophen, hydrALAzine HCl, zolpidem, LORazepam, guaiFENesin-codeine    Results:     Recent Labs   Lab 07/19/20  1650 07/20/20  0551   RBC 4.51 4.19   HGB 13. pneumonia. Correlate with COVID-19 status. 2. No significant pulmonary embolism. 3. No aortic dissection or aneurysmal dilatation. .   Dictated by (CST): Bj Abebe MD on 7/19/2020 at 6:32 PM     Finalized by (CST): Bj Abebe MD on 7/19/

## 2020-07-21 ENCOUNTER — TELEPHONE (OUTPATIENT)
Dept: INTERNAL MEDICINE CLINIC | Facility: CLINIC | Age: 55
End: 2020-07-21

## 2020-07-21 LAB
ANION GAP SERPL CALC-SCNC: 6 MMOL/L (ref 0–18)
ANTIBODY SCREEN: NEGATIVE
BILIRUB UR QL: NEGATIVE
BUN BLD-MCNC: 7 MG/DL (ref 7–18)
BUN/CREAT SERPL: 9.5 (ref 10–20)
CALCIUM BLD-MCNC: 8.1 MG/DL (ref 8.5–10.1)
CHLORIDE SERPL-SCNC: 109 MMOL/L (ref 98–112)
CK SERPL-CCNC: 40 U/L (ref 26–192)
CLARITY UR: CLEAR
CO2 SERPL-SCNC: 26 MMOL/L (ref 21–32)
COLOR UR: YELLOW
CREAT BLD-MCNC: 0.74 MG/DL (ref 0.55–1.02)
CRP SERPL-MCNC: 6.87 MG/DL (ref ?–0.3)
D DIMER PPP FEU-MCNC: 3.94 UG/ML FEU (ref ?–0.55)
DEPRECATED HBV CORE AB SER IA-ACNC: 236.4 NG/ML (ref 18–340)
DEPRECATED RDW RBC AUTO: 47.5 FL (ref 35.1–46.3)
ERYTHROCYTE [DISTWIDTH] IN BLOOD BY AUTOMATED COUNT: 13.8 % (ref 11–15)
GLUCOSE BLD-MCNC: 95 MG/DL (ref 70–99)
GLUCOSE UR-MCNC: NEGATIVE MG/DL
HCT VFR BLD AUTO: 34.9 % (ref 35–48)
HGB BLD-MCNC: 11.4 G/DL (ref 12–16)
HGB UR QL STRIP.AUTO: NEGATIVE
KETONES UR-MCNC: NEGATIVE MG/DL
LDH SERPL L TO P-CCNC: 232 U/L
LEUKOCYTE ESTERASE UR QL STRIP.AUTO: NEGATIVE
MCH RBC QN AUTO: 30.6 PG (ref 26–34)
MCHC RBC AUTO-ENTMCNC: 32.7 G/DL (ref 31–37)
MCV RBC AUTO: 93.8 FL (ref 80–100)
NITRITE UR QL STRIP.AUTO: NEGATIVE
NT-PROBNP SERPL-MCNC: 49 PG/ML (ref ?–125)
OSMOLALITY SERPL CALC.SUM OF ELEC: 290 MOSM/KG (ref 275–295)
PH UR: 6 [PH] (ref 5–8)
PLATELET # BLD AUTO: 251 10(3)UL (ref 150–450)
POTASSIUM SERPL-SCNC: 4.4 MMOL/L (ref 3.5–5.1)
PROCALCITONIN SERPL-MCNC: <0.02 NG/ML (ref ?–0.16)
PROT UR-MCNC: NEGATIVE MG/DL
RBC # BLD AUTO: 3.72 X10(6)UL (ref 3.8–5.3)
RH BLOOD TYPE: POSITIVE
SODIUM SERPL-SCNC: 141 MMOL/L (ref 136–145)
SP GR UR STRIP: 1.01 (ref 1–1.03)
TROPONIN I SERPL-MCNC: <0.045 NG/ML (ref ?–0.04)
UROBILINOGEN UR STRIP-ACNC: <2
WBC # BLD AUTO: 5.9 X10(3) UL (ref 4–11)

## 2020-07-21 PROCEDURE — 99232 SBSQ HOSP IP/OBS MODERATE 35: CPT | Performed by: INTERNAL MEDICINE

## 2020-07-21 PROCEDURE — 99233 SBSQ HOSP IP/OBS HIGH 50: CPT | Performed by: INTERNAL MEDICINE

## 2020-07-21 RX ORDER — DEXAMETHASONE SODIUM PHOSPHATE 4 MG/ML
6 VIAL (ML) INJECTION DAILY
Status: DISCONTINUED | OUTPATIENT
Start: 2020-07-21 | End: 2020-07-25

## 2020-07-21 RX ORDER — DEXAMETHASONE 4 MG/1
4 TABLET ORAL DAILY
Status: DISCONTINUED | OUTPATIENT
Start: 2020-07-21 | End: 2020-07-21

## 2020-07-21 NOTE — PROGRESS NOTES
[facility]OS[facility] - [facility]    Progress Note    Lydia Truong Patient Status:  Inpatient    1965 MRN [id number]   Location [facility] Attending Annalise Mcbride MD   Hosp Day # 2 PCP Mart Calvo MD       Subjective:   No acute events Fluticasone Furoate-Vilanterol  1 puff Inhalation Daily   • cefTRIAXone  1 g Intravenous Q24H   • methimazole  5 mg Oral Daily   • Pantoprazole Sodium  40 mg Oral QAM AC       Current PRN Inpatient Meds:      Albuterol Sulfate HFA, ondansetron HCl, acetami Bilateral multifocal ground-glass airspace opacification worse on the right. Findings consistent with multifocal pneumonitis versus atypical viral pneumonia. Correlate with COVID-19 status. 2. No significant pulmonary embolism.  3. No aortic dissection or Resolving  - Replete lytes PRN  - Monitor     Morbid obesity with likely obstructive sleep apnea  - BMI 48    - Patient counseled regarding diet and exercise,   - Hold CPAP for now due to COVID-19.         Greater than 35 minutes spent, >50% spent counselin

## 2020-07-21 NOTE — PLAN OF CARE
Tried to wean patient off O2 this am. Her O2 sats dropped to 85%, she has been maintaining mid 90's with 1L O2. Tocilizumab given today, possible Plasma to be ordered tomorrow. IS given to patient, IV decadron started today.      Problem: Patient/Family [facility] Assess and instruct to report SOB or any respiratory difficulty  - Respiratory Therapy support as indicated  - Manage/alleviate anxiety  - Monitor for signs/symptoms of CO2 retention  Outcome: Progressing

## 2020-07-21 NOTE — PROGRESS NOTES
[facility]     Progress Note        Lydia Truong Patient Status:  Inpatient    1965 MRN [id number]   Location [address] [facility] Attending Annalise Mcbride MD   Hosp Day # 2 PCP Mart Calvo MD       Subjective:   Patient see acute distress  Eyes: PERRL  ENT: nares pateint  Cardio: RRR, S1 S2  Respiratory: clear to auscultation bilaterally, no wheezing, rales, rhonchi, crackles  GI: abdomen soft, non tender  Extremities: no clubbing, cyanosis, edema  Neurologic: no gross motor Maxim Garzon MD      Assessment   1. Acute respiratory distress  2. COVID 19 pneumonia  3. Leukopenia  4. Elevated d-dimer  5. Asthma     Plan   -Patient presents with 7-day onset of fevers, significant worsening dyspnea over the last 24 hours.   [facility]

## 2020-07-21 NOTE — DIETARY NOTE
ADULT NUTRITION INITIAL ASSESSMENT    RECOMMENDATIONS TO MD:  See Nutrition Intervention    Pt is at moderate nutrition risk.      NUTRITION DIAGNOSIS/PROBLEM:  Inadequate oral intake related to decreased ability to consume sufficient amount of energy in th care: collaboration with other providers    ANTHROPOMETRICS:  HT: 170.2 cm (5' 7\")       WT: (!) 136.8 kg (301 lb 8 oz)      BMI: Body mass index is 47.22 kg/m².          BMI Classification: greater than 40 kg/m2 - morbid obesity class III  IBW: 135 # and adequacy of supplement intake. - Anthropometric Measurement:   Monitor: wt and wt change  - Nutrition Goals: halt wt loss, PO and supplement greater than 75% of needs, labs WNL and promote healing    RD will follow up.      Sulaiman Benoit, RD, LDN, [address]

## 2020-07-21 NOTE — PLAN OF CARE
Patient resting well overnight. Oxygen for comfort. Patient states she feels like she is taking deeper breaths and walking with less sob. Will continue to monitor.      Problem: Patient/Family Goals  Goal: Patient/Family Long Term Goal  Description  Patient difficulty  - Respiratory Therapy support as indicated  - Manage/alleviate anxiety  - Monitor for signs/symptoms of CO2 retention  Outcome: Progressing

## 2020-07-21 NOTE — CDS QUERY
Clarification – Significance of Diagnostic Report/Results  CLINICAL DOCUMENTATION CLARIFICATION FORM  How To Answer This Query:  1)  Click \"Edit Button\" on the toolbar. 2)  Type an \"X\" in the bracket for the diagnosis that applies.   (You may also add have any questions, please contact Clinical Documentation  Specialist:  Corinne Garsia at [phone]     Thank You!      THIS FORM IS A PERMANENT PART OF THE MEDICAL RECORD

## 2020-07-21 NOTE — CONSULTS
[facility] - [facility] ID CONSULT NOTE    Gopi Phillip Patient Status:  Inpatient    1965 MRN [id number]   Location [address] [facility] Attending Kevin Watson MD   Hosp Day # 2 PCP Aggie Kelly MD       Reason for Consultatio Father    • Thyroid Disorder Father    • Heart Disease Mother    • Thyroid Disorder Mother    • Thyroid Disorder Sister    • Thyroid Disorder Brother    • Thyroid Disorder Niece    • Breast Cancer Niece 35      reports that she has never smoked.  She has ne Systems:  CONSTITUTIONAL:  No weight loss, +weakness or +fatigue. HEENT:  Eyes:  No visual loss, blurred vision, double vision or yellow sclerae. Ears, Nose, Throat:  No hearing loss, sneezing, congestion, runny nose or sore throat.   SKIN:  No rash or itc fevers or chills starting 1 day prior 7/18. States she was exposed to COVID-19 while in [place] on 7/5/20. Started having mild cough 7/8. Came back to [place] 7/13 when started having persistent fevers and fatigue.  Went to get tested as an outpatient but the

## 2020-07-22 LAB
BASOPHILS # BLD AUTO: 0.01 X10(3) UL (ref 0–0.2)
BASOPHILS NFR BLD AUTO: 0.3 %
CRP SERPL-MCNC: 5.46 MG/DL (ref ?–0.3)
D DIMER PPP FEU-MCNC: 3.84 UG/ML FEU (ref ?–0.55)
DEPRECATED HBV CORE AB SER IA-ACNC: 226.6 NG/ML (ref 18–340)
DEPRECATED RDW RBC AUTO: 46.1 FL (ref 35.1–46.3)
EOSINOPHIL # BLD AUTO: 0 X10(3) UL (ref 0–0.7)
EOSINOPHIL NFR BLD AUTO: 0 %
ERYTHROCYTE [DISTWIDTH] IN BLOOD BY AUTOMATED COUNT: 13.5 % (ref 11–15)
HCT VFR BLD AUTO: 34.5 % (ref 35–48)
HGB BLD-MCNC: 11.3 G/DL (ref 12–16)
IMM GRANULOCYTES # BLD AUTO: 0.02 X10(3) UL (ref 0–1)
IMM GRANULOCYTES NFR BLD: 0.5 %
LDH SERPL L TO P-CCNC: 243 U/L
LYMPHOCYTES # BLD AUTO: 1.22 X10(3) UL (ref 1–4)
LYMPHOCYTES NFR BLD AUTO: 31.3 %
MCH RBC QN AUTO: 30.6 PG (ref 26–34)
MCHC RBC AUTO-ENTMCNC: 32.8 G/DL (ref 31–37)
MCV RBC AUTO: 93.5 FL (ref 80–100)
MONOCYTES # BLD AUTO: 0.39 X10(3) UL (ref 0.1–1)
MONOCYTES NFR BLD AUTO: 10 %
NEUTROPHILS # BLD AUTO: 2.26 X10 (3) UL (ref 1.5–7.7)
NEUTROPHILS # BLD AUTO: 2.26 X10(3) UL (ref 1.5–7.7)
NEUTROPHILS NFR BLD AUTO: 57.9 %
PLATELET # BLD AUTO: 268 10(3)UL (ref 150–450)
RBC # BLD AUTO: 3.69 X10(6)UL (ref 3.8–5.3)
WBC # BLD AUTO: 3.9 X10(3) UL (ref 4–11)

## 2020-07-22 PROCEDURE — 99232 SBSQ HOSP IP/OBS MODERATE 35: CPT | Performed by: INTERNAL MEDICINE

## 2020-07-22 PROCEDURE — 99233 SBSQ HOSP IP/OBS HIGH 50: CPT | Performed by: HOSPITALIST

## 2020-07-22 PROCEDURE — 30233K1 TRANSFUSION OF NONAUTOLOGOUS FROZEN PLASMA INTO PERIPHERAL VEIN, PERCUTANEOUS APPROACH: ICD-10-PCS | Performed by: HOSPITALIST

## 2020-07-22 RX ORDER — SODIUM CHLORIDE 9 MG/ML
INJECTION, SOLUTION INTRAVENOUS ONCE
Status: DISCONTINUED | OUTPATIENT
Start: 2020-07-22 | End: 2020-07-25

## 2020-07-22 RX ORDER — TRIAMTERENE AND HYDROCHLOROTHIAZIDE 37.5; 25 MG/1; MG/1
1 CAPSULE ORAL
Status: DISCONTINUED | OUTPATIENT
Start: 2020-07-23 | End: 2020-07-25

## 2020-07-22 NOTE — PLAN OF CARE
P; atient did well overnight, VSS, weaning off of 1LNC. Desats into high 80s when ambulatory. Patient is self proning and using the isb. Patient felt like she is retaining fluid and had a weight gain. IVF stopped. Plan for possible plasma today.  No complain Spirometry  - Assess the need for suctioning and perform as needed  - Assess and instruct to report SOB or any respiratory difficulty  - Respiratory Therapy support as indicated  - Manage/alleviate anxiety  - Monitor for signs/symptoms of CO2 retention  Opal Mcnair

## 2020-07-22 NOTE — PROGRESS NOTES
[facility]     Progress Note        Yenni Fung Patient Status:  Inpatient    1965 MRN [id number]   Location [address] [facility] Attending Adriano San MD   Hosp Day # 3 PCP Dave Heller MD       Subjective:   Patient see S1 S2  Respiratory: clear to auscultation bilaterally, no wheezing, rales, rhonchi, crackles  GI: abdomen soft, non tender  Extremities: no clubbing, cyanosis, edema  Neurologic: no gross motor deficits  Skin: warm, dry      Results:     Lab Results   Comp

## 2020-07-22 NOTE — PLAN OF CARE
VS stable. Patient on 1 liter of oxygen, saturating 96%. Still c/o occasional shortness of breath with activity and nonproductive cough. Afebrile. Plasma request form sent to blood bank.  Tylenol given for c/o a headache, and robitussin with codeine given f Cessation handout, if applicable  - Encourage broncho-pulmonary hygiene including cough, deep breathe, Incentive Spirometry  - Assess the need for suctioning and perform as needed  - Assess and instruct to report SOB or any respiratory difficulty  - Respir

## 2020-07-22 NOTE — PROGRESS NOTES
[facility]    Progress Note    Sobeida Moody Patient Status:  Inpatient    1965 MRN [id number]   Location [address] [facility] Attending Chadwick Jewell MD   Hosp Day # 3 PCP Aubrey Vieira MD       Subjective:   Sobeida Moody is fe (CST): Stan Herr MD on 7/19/2020 at 6:32 PM     Finalized by (CST): Stan Herr MD on 7/19/2020 at 6:40 PM               • sodium chloride   Intravenous Once   • dexamethasone Sodium Phosphate  6 mg Intravenous Daily   • enoxaparin  40 mg

## 2020-07-23 LAB
ANION GAP SERPL CALC-SCNC: 7 MMOL/L (ref 0–18)
BASOPHILS # BLD AUTO: 0.01 X10(3) UL (ref 0–0.2)
BASOPHILS NFR BLD AUTO: 0.2 %
BLOOD TYPE BARCODE: 6200
BUN BLD-MCNC: 13 MG/DL (ref 7–18)
BUN/CREAT SERPL: 15.9 (ref 10–20)
CALCIUM BLD-MCNC: 9.1 MG/DL (ref 8.5–10.1)
CHLORIDE SERPL-SCNC: 107 MMOL/L (ref 98–112)
CO2 SERPL-SCNC: 28 MMOL/L (ref 21–32)
CREAT BLD-MCNC: 0.82 MG/DL (ref 0.55–1.02)
CRP SERPL-MCNC: 2.57 MG/DL (ref ?–0.3)
D DIMER PPP FEU-MCNC: 3.92 UG/ML FEU (ref ?–0.55)
DEPRECATED HBV CORE AB SER IA-ACNC: 229.7 NG/ML (ref 18–340)
DEPRECATED RDW RBC AUTO: 45.8 FL (ref 35.1–46.3)
EOSINOPHIL # BLD AUTO: 0 X10(3) UL (ref 0–0.7)
EOSINOPHIL NFR BLD AUTO: 0 %
ERYTHROCYTE [DISTWIDTH] IN BLOOD BY AUTOMATED COUNT: 13.4 % (ref 11–15)
GLUCOSE BLD-MCNC: 103 MG/DL (ref 70–99)
HCT VFR BLD AUTO: 33.9 % (ref 35–48)
HGB BLD-MCNC: 11.2 G/DL (ref 12–16)
IMM GRANULOCYTES # BLD AUTO: 0.04 X10(3) UL (ref 0–1)
IMM GRANULOCYTES NFR BLD: 0.6 %
LDH SERPL L TO P-CCNC: 264 U/L
LYMPHOCYTES # BLD AUTO: 1.73 X10(3) UL (ref 1–4)
LYMPHOCYTES NFR BLD AUTO: 26.6 %
MCH RBC QN AUTO: 30.5 PG (ref 26–34)
MCHC RBC AUTO-ENTMCNC: 33 G/DL (ref 31–37)
MCV RBC AUTO: 92.4 FL (ref 80–100)
MONOCYTES # BLD AUTO: 0.53 X10(3) UL (ref 0.1–1)
MONOCYTES NFR BLD AUTO: 8.2 %
NEUTROPHILS # BLD AUTO: 4.19 X10 (3) UL (ref 1.5–7.7)
NEUTROPHILS # BLD AUTO: 4.19 X10(3) UL (ref 1.5–7.7)
NEUTROPHILS NFR BLD AUTO: 64.4 %
OSMOLALITY SERPL CALC.SUM OF ELEC: 294 MOSM/KG (ref 275–295)
PLATELET # BLD AUTO: 320 10(3)UL (ref 150–450)
POTASSIUM SERPL-SCNC: 4 MMOL/L (ref 3.5–5.1)
RBC # BLD AUTO: 3.67 X10(6)UL (ref 3.8–5.3)
SODIUM SERPL-SCNC: 142 MMOL/L (ref 136–145)
WBC # BLD AUTO: 6.5 X10(3) UL (ref 4–11)

## 2020-07-23 PROCEDURE — 99232 SBSQ HOSP IP/OBS MODERATE 35: CPT | Performed by: INTERNAL MEDICINE

## 2020-07-23 PROCEDURE — 99233 SBSQ HOSP IP/OBS HIGH 50: CPT | Performed by: HOSPITALIST

## 2020-07-23 RX ORDER — ECHINACEA PURPUREA EXTRACT 125 MG
1 TABLET ORAL
Status: DISCONTINUED | OUTPATIENT
Start: 2020-07-23 | End: 2020-07-25

## 2020-07-23 RX ORDER — CYCLOBENZAPRINE HCL 10 MG
10 TABLET ORAL 3 TIMES DAILY PRN
Status: DISCONTINUED | OUTPATIENT
Start: 2020-07-23 | End: 2020-07-25

## 2020-07-23 RX ORDER — DOCUSATE SODIUM 100 MG/1
100 CAPSULE, LIQUID FILLED ORAL 2 TIMES DAILY
Status: DISCONTINUED | OUTPATIENT
Start: 2020-07-23 | End: 2020-07-25

## 2020-07-23 RX ORDER — KETOROLAC TROMETHAMINE 30 MG/ML
30 INJECTION, SOLUTION INTRAMUSCULAR; INTRAVENOUS ONCE
Status: COMPLETED | OUTPATIENT
Start: 2020-07-23 | End: 2020-07-23

## 2020-07-23 NOTE — PROGRESS NOTES
[facility] PROGRESS NOTE    Ileana Louise Patient Status:  Inpatient    1965 MRN [id number]   Location [facility] Attending Marquis Limon MD   Hosp Day # 4 PCP Angel Torrez MD     Subjective:  Patient status 7/5/20. Started having mild cough 7/8. Came back to [place] 7/13 when started having persistent fevers and fatigue. Went to get tested as an outpatient but the result is not back yet. On admission afebrile with WBC of 3.8 with T-max of 101.2.   Procalcitonin no

## 2020-07-23 NOTE — PLAN OF CARE
BP elevated this morning. PRN Hydralazine IVP given - BP improved. SB on telemetry. Multiple complaints this morning. Pt stated she did not get good sleep. She had a small diarrhea and her abdomen is bothering her.  Also, states the bed is not comfortable, oxygenation  Description  INTERVENTIONS:  - Assess for changes in respiratory status  - Assess for changes in mentation and behavior  - Position to facilitate oxygenation and minimize respiratory effort  - Oxygen supplementation based on oxygen saturation

## 2020-07-23 NOTE — PROGRESS NOTES
[facility]    Progress Note    Grace Warner Patient Status:  Inpatient    1965 MRN [id number]   Location [address] [facility] Attending Rosangela Enriquez MD   Hosp Day # 4 PCP Olivia Dukes MD       Subjective:   Grace Warner was n Furoate-Vilanterol  1 puff Inhalation Daily   • methimazole  5 mg Oral Daily   • Pantoprazole Sodium  40 mg Oral QAM AC     Saline Nasal Spray, cyclobenzaprine, sodium chloride 0.9%, Albuterol Sulfate HFA, ondansetron HCl, acetaminophen, hydrALAzine HCl, z

## 2020-07-23 NOTE — PROGRESS NOTES
[facility]     [facility]        Jessica Florian Patient Status:  Inpatient    1965 MRN [id number]   Location [facility] Attending Steve San MD   Hosp Day # 4 PCP Natalia Mccoy MD       Subjective:   Patient see PRN  guaiFENesin-codeine (ROBITUSSIN AC) 100-10 MG/5ML syrup 5 mL, 5 mL, Oral, Q4H PRN        Continuous Infusions:       Physical Exam  Constitutional: no acute distress  Eyes: PERRL  ENT: nares pateint  Cardio: RRR, S1 S2  Respiratory: clear to auscultat

## 2020-07-23 NOTE — PLAN OF CARE
Pt stating not sleeping well tonight - refusing PRN sleeping medications. Robitussin administered for cough. SB lowest HR 34 briefly. Maintained 1L O2 nasal cannula with saturations > 95%. Pt ambulating ad otto to bathroom. Con't IV steroids.  Sputum sample Assess the need for suctioning and perform as needed  - Assess and instruct to report SOB or any respiratory difficulty  - Respiratory Therapy support as indicated  - Manage/alleviate anxiety  - Monitor for signs/symptoms of CO2 retention  Outcome: [place]

## 2020-07-24 ENCOUNTER — APPOINTMENT (OUTPATIENT)
Dept: GENERAL RADIOLOGY | Facility: HOSPITAL | Age: 55
DRG: 177 | End: 2020-07-24
Attending: INTERNAL MEDICINE
Payer: COMMERCIAL

## 2020-07-24 LAB
ANION GAP SERPL CALC-SCNC: 5 MMOL/L (ref 0–18)
BASOPHILS # BLD AUTO: 0.02 X10(3) UL (ref 0–0.2)
BASOPHILS NFR BLD AUTO: 0.3 %
BUN BLD-MCNC: 18 MG/DL (ref 7–18)
BUN/CREAT SERPL: 18.6 (ref 10–20)
CALCIUM BLD-MCNC: 8.9 MG/DL (ref 8.5–10.1)
CHLORIDE SERPL-SCNC: 108 MMOL/L (ref 98–112)
CO2 SERPL-SCNC: 28 MMOL/L (ref 21–32)
CREAT BLD-MCNC: 0.97 MG/DL (ref 0.55–1.02)
DEPRECATED RDW RBC AUTO: 44.5 FL (ref 35.1–46.3)
EOSINOPHIL # BLD AUTO: 0 X10(3) UL (ref 0–0.7)
EOSINOPHIL NFR BLD AUTO: 0 %
ERYTHROCYTE [DISTWIDTH] IN BLOOD BY AUTOMATED COUNT: 13.1 % (ref 11–15)
GLUCOSE BLD-MCNC: 115 MG/DL (ref 70–99)
HCT VFR BLD AUTO: 34.7 % (ref 35–48)
HGB BLD-MCNC: 11.5 G/DL (ref 12–16)
IMM GRANULOCYTES # BLD AUTO: 0.11 X10(3) UL (ref 0–1)
IMM GRANULOCYTES NFR BLD: 1.6 %
LYMPHOCYTES # BLD AUTO: 1.99 X10(3) UL (ref 1–4)
LYMPHOCYTES NFR BLD AUTO: 29.6 %
MCH RBC QN AUTO: 30.6 PG (ref 26–34)
MCHC RBC AUTO-ENTMCNC: 33.1 G/DL (ref 31–37)
MCV RBC AUTO: 92.3 FL (ref 80–100)
MONOCYTES # BLD AUTO: 0.47 X10(3) UL (ref 0.1–1)
MONOCYTES NFR BLD AUTO: 7 %
NEUTROPHILS # BLD AUTO: 4.14 X10 (3) UL (ref 1.5–7.7)
NEUTROPHILS # BLD AUTO: 4.14 X10(3) UL (ref 1.5–7.7)
NEUTROPHILS NFR BLD AUTO: 61.5 %
OSMOLALITY SERPL CALC.SUM OF ELEC: 295 MOSM/KG (ref 275–295)
PLATELET # BLD AUTO: 351 10(3)UL (ref 150–450)
POTASSIUM SERPL-SCNC: 3.9 MMOL/L (ref 3.5–5.1)
RBC # BLD AUTO: 3.76 X10(6)UL (ref 3.8–5.3)
SODIUM SERPL-SCNC: 141 MMOL/L (ref 136–145)
WBC # BLD AUTO: 6.7 X10(3) UL (ref 4–11)

## 2020-07-24 PROCEDURE — 99233 SBSQ HOSP IP/OBS HIGH 50: CPT | Performed by: HOSPITALIST

## 2020-07-24 PROCEDURE — 99232 SBSQ HOSP IP/OBS MODERATE 35: CPT | Performed by: INTERNAL MEDICINE

## 2020-07-24 PROCEDURE — 71045 X-RAY EXAM CHEST 1 VIEW: CPT | Performed by: INTERNAL MEDICINE

## 2020-07-24 RX ORDER — CYCLOBENZAPRINE HCL 10 MG
10 TABLET ORAL 3 TIMES DAILY PRN
Qty: 10 TABLET | Refills: 0 | Status: SHIPPED | OUTPATIENT
Start: 2020-07-24 | End: 2021-08-27

## 2020-07-24 RX ORDER — DEXAMETHASONE 2 MG/1
6 TABLET ORAL
Qty: 18 TABLET | Refills: 0 | Status: SHIPPED | OUTPATIENT
Start: 2020-07-24 | End: 2020-07-30

## 2020-07-24 NOTE — PROGRESS NOTES
Spoke with patient and she just feels to weak to go home today. She would prefer to go home tomorrow. Will hold off on discharge for now.        Mckenna Dick MD

## 2020-07-24 NOTE — PLAN OF CARE
Problem: Patient/Family Goals  Goal: Patient/Family Long Term Goal  Description  Patient's Long Term Goal: to go back home    Interventions:  - administer meds as ordered  - monitor respiratory status  - See additional Care Plan goals for specific interv

## 2020-07-24 NOTE — DISCHARGE SUMMARY
[facility]    Discharge Summary    Sobeida Moody Patient Status:  Inpatient    1965 MRN [id number]   Location [address] [facility] Attending Chadwick Jewell MD   Hosp Day # 5 PCP Aubrey Vieira MD     Date of Admission: 2020   D positive   - Isolation precautions   - Continue on dexamethasone 6 mg daily for 6 more days  - xarelto 10 mg daily for 30 days- d dimer was 3.9  - Supportive care    - Pulmonary on consult, nathan recs  - now off oxygen  - cleared for discharge- discus daily.    methimazole 5 MG Oral Tab  Take 1 tablet (5 mg total) by mouth daily. valACYclovir HCl 1 G Oral Tab  Take 1 tablet (1,000 mg total) by mouth daily.     betamethasone valerate 0.1 % External Cream  Apply 1 Application topically 2 (two) times cj Quantity:  45 g  Refills:  0     Breo Ellipta 100-25 MCG/INH Aepb  Generic drug:  Fluticasone Furoate-Vilanterol      INHALE 1 PUFF BY MOUTH EVERY DAY   Refills:  1     Proventil  (90 Base) MCG/ACT Aers  Generic drug:  Albuterol Sulfate HFA      Inh

## 2020-07-24 NOTE — PLAN OF CARE
No complaints overnight. Maintained 1L O2 nasal cannula. Pt ambulating ad otto to bathroom. Con't IV decadron. O2 walk prior to discharge.      Problem: Patient/Family Goals  Goal: Patient/Family Long Term Goal  Description  Patient's Long Term Goal: to go b support as indicated  - Manage/alleviate anxiety  - Monitor for signs/symptoms of CO2 retention  Outcome: Progressing

## 2020-07-24 NOTE — PROGRESS NOTES
[facility]     Progress Note        Grace Warner Patient Status:  Inpatient    1965 MRN [id number]   Location [address] [facility] Attending Nikki Urias MD   Hosp Day # 5 PCP Olivia Dukes MD       Subjective:   Patient see 1 mg, Intravenous, Q6H PRN  guaiFENesin-codeine (ROBITUSSIN AC) 100-10 MG/5ML syrup 5 mL, 5 mL, Oral, Q4H PRN        Continuous Infusions:       Physical Exam  Constitutional: no acute distress  Eyes: PERRL  ENT: nares pateint  Cardio: RRR, S1 S2  Respirat

## 2020-07-25 VITALS
BODY MASS INDEX: 45.99 KG/M2 | DIASTOLIC BLOOD PRESSURE: 77 MMHG | HEIGHT: 67 IN | TEMPERATURE: 98 F | RESPIRATION RATE: 20 BRPM | WEIGHT: 293 LBS | OXYGEN SATURATION: 94 % | HEART RATE: 59 BPM | SYSTOLIC BLOOD PRESSURE: 152 MMHG

## 2020-07-25 PROCEDURE — 99239 HOSP IP/OBS DSCHRG MGMT >30: CPT | Performed by: HOSPITALIST

## 2020-07-25 PROCEDURE — 99232 SBSQ HOSP IP/OBS MODERATE 35: CPT | Performed by: INTERNAL MEDICINE

## 2020-07-25 RX ORDER — AMLODIPINE BESYLATE 5 MG/1
5 TABLET ORAL DAILY
Qty: 30 TABLET | Refills: 0 | Status: SHIPPED | OUTPATIENT
Start: 2020-07-26

## 2020-07-25 RX ORDER — AMLODIPINE BESYLATE 5 MG/1
5 TABLET ORAL DAILY
Status: DISCONTINUED | OUTPATIENT
Start: 2020-07-25 | End: 2020-07-25

## 2020-07-25 RX ORDER — ACETAMINOPHEN 500 MG
1000 TABLET ORAL ONCE
Status: COMPLETED | OUTPATIENT
Start: 2020-07-25 | End: 2020-07-25

## 2020-07-25 RX ORDER — KETOROLAC TROMETHAMINE 30 MG/ML
30 INJECTION, SOLUTION INTRAMUSCULAR; INTRAVENOUS ONCE
Status: COMPLETED | OUTPATIENT
Start: 2020-07-25 | End: 2020-07-25

## 2020-07-25 NOTE — PROGRESS NOTES
[facility]    Progress Note      Assessment and Plan:   1. Coronavirus pneumonitis–improving clinically. Still has groundglass infiltrates more on the right than the left, but the x-ray is quite unimpressive.   She was off oxygen at the ti

## 2020-07-25 NOTE — PROGRESS NOTES
[facility]    Progress Note    Ileana Louise Patient Status:  Inpatient    1965 MRN [id number]   Location [address] [facility] Attending Marquis Limon MD   Hosp Day # 6 PCP Angel Torrez MD       Subjective:   Ileana Louise is no sodium chloride   Intravenous Once   • Triamterene-HCTZ  1 capsule Oral Daily   • dexamethasone Sodium Phosphate  6 mg Intravenous Daily   • enoxaparin  40 mg Subcutaneous 2 times per day   • Fluticasone Furoate-Vilanterol  1 puff Inhalation Daily   • meth

## 2020-07-27 ENCOUNTER — PATIENT OUTREACH (OUTPATIENT)
Dept: CASE MANAGEMENT | Age: 55
End: 2020-07-27

## 2020-07-27 ENCOUNTER — TELEPHONE (OUTPATIENT)
Dept: PULMONOLOGY | Facility: CLINIC | Age: 55
End: 2020-07-27

## 2020-07-27 DIAGNOSIS — Z02.9 ENCOUNTERS FOR UNSPECIFIED ADMINISTRATIVE PURPOSE: ICD-10-CM

## 2020-07-27 PROCEDURE — 1111F DSCHRG MED/CURRENT MED MERGE: CPT

## 2020-07-27 NOTE — PROGRESS NOTES
A [employer] message was sent to the patient for outreach to complete TCM. It was requested the patient call [facility] back at, [phone].

## 2020-07-27 NOTE — TELEPHONE ENCOUNTER
Pt had positive COVID-19 test on 7/19/20 & was discharged from [facility] 7/25/20. Confirmed travel screen done by phone room staff was only positive for SOB & appt she was given for 8/21/20 12 pm w/ NORRIS Cisneros.  Explained CXR not done in clinic (R

## 2020-07-28 ENCOUNTER — TELEPHONE (OUTPATIENT)
Dept: INTERNAL MEDICINE CLINIC | Facility: CLINIC | Age: 55
End: 2020-07-28

## 2020-07-28 NOTE — TELEPHONE ENCOUNTER
Patient called and spoke to a PSR staff in phone room and requested to schedule a follow-up appointment after a recent Hospitalization. Patient was discharged 7-25-20. Left a message for the patient for call our office.   Patient is scheduled for 8-12-20

## 2020-07-28 NOTE — TELEPHONE ENCOUNTER
Patient returned my call - patient is requesting an in office visit appointment with Dr. Zackery Winn versus a virtual visit.   Patient informed that Dr. Zackery Winn is out of the office at this time, but that I would speak to her in the morning and call the patie

## 2020-07-28 NOTE — TELEPHONE ENCOUNTER
Spoke to pt for TCM today. Pt does not have HFU appt scheduled at this time. TCM/HFU appt recommended by 8/8 as pt is a moderate risk for readmission. Please advise.     BOOK BY DATE (last date for TCM): 8/8    TRIAGE:  Please f/u with pt and try to get

## 2020-07-28 NOTE — PROGRESS NOTES
Initial Post Discharge Follow Up   Discharge Date: 7/25/20  Contact Date: 7/27/2020    Consent Verification:  Assessment Completed With: Patient  HIPAA Verified?   Yes    Discharge Dx:     Suspected COVID-19 virus infection  Active Problems:    Hypoxia daily. (Patient taking differently: Take 2.5 mg by mouth daily.  ) 90 tablet 0   • valACYclovir HCl 1 G Oral Tab Take 1 tablet (1,000 mg total) by mouth daily.  90 tablet 4   • betamethasone valerate 0.1 % External Cream Apply 1 Application topically 2 (two available at your phone so that your physician can contact you, and be prepared with any questions or concerns. You may be billed a copay at a later time, depending upon your insurance. As always, your health is our priority.        Aug 21, 2020 12:00 PM CD

## 2020-07-29 NOTE — TELEPHONE ENCOUNTER
Called and left patient a message that Dr. Ida Torres will see her in the office on 8-12-20 for a Hospital Follow-up appointment. Left patient a message to call the office with any additional questions or concerns.

## 2020-07-30 ENCOUNTER — TELEPHONE (OUTPATIENT)
Dept: INTERNAL MEDICINE CLINIC | Facility: CLINIC | Age: 55
End: 2020-07-30

## 2020-07-30 NOTE — TELEPHONE ENCOUNTER
Discussed with the patient   Today is the last day of dexamethasone   Feeling dizzy and tired   Discussed with the patient symptoms might be from Covid and not due to amlodipine.     Discussed option to switch amlodipine to losartan reports that she had use

## 2020-07-30 NOTE — TELEPHONE ENCOUNTER
The patient contacted the NCM to c/o possible medication side effects since being discharged on 7/25/2020. The patient is c/o fatigue, dizziness and lightheadedness. The patient believes this is due to the newly prescribed amlodipine.    The patient was pre

## 2020-08-03 ENCOUNTER — TELEPHONE (OUTPATIENT)
Dept: INTERNAL MEDICINE CLINIC | Facility: CLINIC | Age: 55
End: 2020-08-03

## 2020-08-03 NOTE — TELEPHONE ENCOUNTER
Discussed with the patient   Switching xarelto to Eliquis   To start first pill today 2.5 mg until she gets to see pulmonary

## 2020-08-03 NOTE — TELEPHONE ENCOUNTER
Discussed with the patient   Discussed the need for anticoagulation and why we use it in Covid patients   Advised to switch to another form of anticoagulation will check with pulmonary if ok to switch to eliquis

## 2020-08-12 ENCOUNTER — OFFICE VISIT (OUTPATIENT)
Dept: INTERNAL MEDICINE CLINIC | Facility: CLINIC | Age: 55
End: 2020-08-12

## 2020-08-12 VITALS
HEIGHT: 67 IN | DIASTOLIC BLOOD PRESSURE: 88 MMHG | SYSTOLIC BLOOD PRESSURE: 152 MMHG | WEIGHT: 293 LBS | BODY MASS INDEX: 45.99 KG/M2 | OXYGEN SATURATION: 97 % | HEART RATE: 88 BPM

## 2020-08-12 DIAGNOSIS — E05.90 HYPERTHYROIDISM: ICD-10-CM

## 2020-08-12 DIAGNOSIS — U07.1 COVID-19: ICD-10-CM

## 2020-08-12 DIAGNOSIS — R79.82 CRP ELEVATED: ICD-10-CM

## 2020-08-12 DIAGNOSIS — Z09 HOSPITAL DISCHARGE FOLLOW-UP: Primary | ICD-10-CM

## 2020-08-12 DIAGNOSIS — E78.5 HYPERLIPIDEMIA, UNSPECIFIED HYPERLIPIDEMIA TYPE: ICD-10-CM

## 2020-08-12 DIAGNOSIS — I10 ESSENTIAL HYPERTENSION: ICD-10-CM

## 2020-08-12 DIAGNOSIS — R79.89 POSITIVE D DIMER: ICD-10-CM

## 2020-08-12 LAB
BASOPHILS # BLD AUTO: 0.03 X10(3) UL (ref 0–0.2)
BASOPHILS NFR BLD AUTO: 0.8 %
CRP SERPL-MCNC: <0.29 MG/DL (ref ?–0.3)
D DIMER PPP FEU-MCNC: 3.66 UG/ML FEU (ref ?–0.55)
DEPRECATED RDW RBC AUTO: 48.6 FL (ref 35.1–46.3)
EOSINOPHIL # BLD AUTO: 0.14 X10(3) UL (ref 0–0.7)
EOSINOPHIL NFR BLD AUTO: 4 %
ERYTHROCYTE [DISTWIDTH] IN BLOOD BY AUTOMATED COUNT: 14.1 % (ref 11–15)
ERYTHROCYTE [SEDIMENTATION RATE] IN BLOOD: 12 MM/HR (ref 0–30)
HCT VFR BLD AUTO: 40.3 % (ref 35–48)
HGB BLD-MCNC: 13.3 G/DL (ref 12–16)
IMM GRANULOCYTES # BLD AUTO: 0 X10(3) UL (ref 0–1)
IMM GRANULOCYTES NFR BLD: 0 %
LYMPHOCYTES # BLD AUTO: 1.59 X10(3) UL (ref 1–4)
LYMPHOCYTES NFR BLD AUTO: 44.9 %
MCH RBC QN AUTO: 31.2 PG (ref 26–34)
MCHC RBC AUTO-ENTMCNC: 33 G/DL (ref 31–37)
MCV RBC AUTO: 94.6 FL (ref 80–100)
MONOCYTES # BLD AUTO: 0.36 X10(3) UL (ref 0.1–1)
MONOCYTES NFR BLD AUTO: 10.2 %
NEUTROPHILS # BLD AUTO: 1.42 X10 (3) UL (ref 1.5–7.7)
NEUTROPHILS # BLD AUTO: 1.42 X10(3) UL (ref 1.5–7.7)
NEUTROPHILS NFR BLD AUTO: 40.1 %
PLATELET # BLD AUTO: 198 10(3)UL (ref 150–450)
RBC # BLD AUTO: 4.26 X10(6)UL (ref 3.8–5.3)
WBC # BLD AUTO: 3.5 X10(3) UL (ref 4–11)

## 2020-08-12 PROCEDURE — 86140 C-REACTIVE PROTEIN: CPT | Performed by: INTERNAL MEDICINE

## 2020-08-12 PROCEDURE — 99214 OFFICE O/P EST MOD 30 MIN: CPT | Performed by: INTERNAL MEDICINE

## 2020-08-12 PROCEDURE — 85379 FIBRIN DEGRADATION QUANT: CPT | Performed by: INTERNAL MEDICINE

## 2020-08-12 PROCEDURE — 3008F BODY MASS INDEX DOCD: CPT | Performed by: INTERNAL MEDICINE

## 2020-08-12 PROCEDURE — 85025 COMPLETE CBC W/AUTO DIFF WBC: CPT | Performed by: INTERNAL MEDICINE

## 2020-08-12 PROCEDURE — 36415 COLL VENOUS BLD VENIPUNCTURE: CPT | Performed by: INTERNAL MEDICINE

## 2020-08-12 PROCEDURE — 3079F DIAST BP 80-89 MM HG: CPT | Performed by: INTERNAL MEDICINE

## 2020-08-12 PROCEDURE — 85652 RBC SED RATE AUTOMATED: CPT | Performed by: INTERNAL MEDICINE

## 2020-08-12 PROCEDURE — 3077F SYST BP >= 140 MM HG: CPT | Performed by: INTERNAL MEDICINE

## 2020-08-12 PROCEDURE — 1111F DSCHRG MED/CURRENT MED MERGE: CPT | Performed by: INTERNAL MEDICINE

## 2020-08-12 NOTE — PROGRESS NOTES
Reyna Bailon is a 54year old female.     Chief complaint: hospital discharge follow up       HPI:   Robert Kussmaul ninoska(n) 54year old female, with a past medical history significant for asthma currently on inhaled steroids was admitted to hospital for tablet 0   • rivaroxaban 10 MG Oral Tab Take 1 tablet (10 mg total) by mouth daily with food. 30 tablet 0   • acetaminophen 500 MG Oral Tab Take 1,000 mg by mouth every 6 (six) hours as needed for Pain.      • methimazole 5 MG Oral Tab Take 1 tablet (5 mg t Vitamin D deficiency     Prediabetes     Bloating     Constipation     Gallbladder polyp     Gastroesophageal reflux disease     Abnormal nuclear stress test     Chest tightness     Family history of thyroid disorder     Gall bladder polyp     Hyperthyroid DIFFERENTIAL WITH PLATELET; Future  - D-DIMER; Future  - SED RATE, WESTERGREN (AUTOMATED);  Future  - C-REACTIVE PROTEIN; Future  - CBC WITH DIFFERENTIAL WITH PLATELET  - D-DIMER  - SED RATE, WESTERGREN (AUTOMATED)  - C-REACTIVE PROTEIN  - CBC W/ DIFFERENTI PLATELET  - D-DIMER  - SED RATE, KAYLA (AUTOMATED)  - C-REACTIVE PROTEIN  - CBC W/ DIFFERENTIAL    7-hyperthyroidism :  Follow up with endo   Advised to hold off on repeating TSH and thyroid levels now since she was ill in the hospital to avoid sick e

## 2020-08-12 NOTE — PATIENT INSTRUCTIONS
Controlling High Blood Pressure  High blood pressure (hypertension) is often called the silent killer. This is because many people who have it, don’t know it. It can be very dangerous.  High blood pressure can raise your risk of heart attack, stroke, hear · When you go to a restaurant, ask that your meal be prepared with no added salt. Stay at a healthy weight  · Ask your healthcare provider how many calories to eat a day. Then stick to that number.   · Ask your healthcare provider what weight range is he © 0432-2522 [employer] [address] [facility], [facility] [place]. All rights reserved. This information is not intended as a substitute for professional medical care. Always follow your healthcare professional's instructions.       [address]

## 2020-08-18 ENCOUNTER — TELEPHONE (OUTPATIENT)
Dept: INTERNAL MEDICINE CLINIC | Facility: CLINIC | Age: 55
End: 2020-08-18

## 2020-08-18 DIAGNOSIS — R19.7 DIARRHEA, UNSPECIFIED TYPE: Primary | ICD-10-CM

## 2020-08-18 NOTE — TELEPHONE ENCOUNTER
Called the patient to check on her and see how she is doing and to ask about her symptoms   No answer MIGUEL ANGEL left

## 2020-08-21 ENCOUNTER — OFFICE VISIT (OUTPATIENT)
Dept: PULMONOLOGY | Facility: CLINIC | Age: 55
End: 2020-08-21

## 2020-08-21 VITALS
RESPIRATION RATE: 20 BRPM | OXYGEN SATURATION: 96 % | BODY MASS INDEX: 45.99 KG/M2 | DIASTOLIC BLOOD PRESSURE: 83 MMHG | TEMPERATURE: 98 F | WEIGHT: 293 LBS | HEART RATE: 80 BPM | SYSTOLIC BLOOD PRESSURE: 136 MMHG | HEIGHT: 67 IN

## 2020-08-21 DIAGNOSIS — R06.83 SNORING: ICD-10-CM

## 2020-08-21 DIAGNOSIS — J45.30 MILD PERSISTENT ASTHMA WITHOUT COMPLICATION: ICD-10-CM

## 2020-08-21 DIAGNOSIS — Z86.16 HISTORY OF 2019 NOVEL CORONAVIRUS DISEASE (COVID-19): Primary | ICD-10-CM

## 2020-08-21 DIAGNOSIS — R94.2 ABNORMAL PFTS (PULMONARY FUNCTION TESTS): ICD-10-CM

## 2020-08-21 PROCEDURE — 3079F DIAST BP 80-89 MM HG: CPT | Performed by: PHYSICIAN ASSISTANT

## 2020-08-21 PROCEDURE — 99214 OFFICE O/P EST MOD 30 MIN: CPT | Performed by: PHYSICIAN ASSISTANT

## 2020-08-21 PROCEDURE — 3075F SYST BP GE 130 - 139MM HG: CPT | Performed by: PHYSICIAN ASSISTANT

## 2020-08-21 PROCEDURE — 3008F BODY MASS INDEX DOCD: CPT | Performed by: PHYSICIAN ASSISTANT

## 2020-08-21 NOTE — PROGRESS NOTES
Pulmonary Progress Note    History of Present Illness:  Manju Grier is a 54year old female presenting to pulmonary clinic today for intermittent dyspnea. The patient was recently diagnosed with COVID-19 pneumonia and hospitalized at [facility] 7/19-7/25.  She re No rash. Muscles and joints notable for back pain. No weight loss or weight gain.      Physical Exam:  /83 (BP Location: Left arm, Patient Position: Sitting, Cuff Size: large)   Pulse 80   Temp 98.4 °F (36.9 °C) (Oral)   Resp 20   Ht 5' 7\" (1.702 m) Polysomnography   2. Weight loss   3. Avoid alcohol and sedating drugs   4. Never drive if sleepy    Follow-up contingent on results of above. Call promptly if new/worsening respiratory sxs. This case was d/w Dr. Anaya Kulkarni.        Marie Valladares PA-C

## 2020-08-21 NOTE — PATIENT INSTRUCTIONS
1. Cardiology referral to [address] [place] [address] - 2nd floor of this building - for consideration of Echocardiogram    2. Continue Breo - refill sent    3. Albuterol as needed    4. Sleep study - will call with results    5. Chest x-ray    6.  Okay to stop Eliquis

## 2020-08-25 ENCOUNTER — HOSPITAL ENCOUNTER (OUTPATIENT)
Dept: GENERAL RADIOLOGY | Age: 55
Discharge: HOME | End: 2020-08-25
Attending: PHYSICIAN ASSISTANT
Payer: COMMERCIAL

## 2020-08-25 ENCOUNTER — LAB ENCOUNTER (OUTPATIENT)
Dept: LAB | Facility: REFERENCE LAB | Age: 55
End: 2020-08-25
Attending: INTERNAL MEDICINE
Payer: COMMERCIAL

## 2020-08-25 ENCOUNTER — PATIENT MESSAGE (OUTPATIENT)
Dept: PULMONOLOGY | Facility: CLINIC | Age: 55
End: 2020-08-25

## 2020-08-25 DIAGNOSIS — E05.90 HYPERTHYROIDISM: ICD-10-CM

## 2020-08-25 DIAGNOSIS — R94.2 ABNORMAL PFTS: Primary | ICD-10-CM

## 2020-08-25 DIAGNOSIS — Z86.16 HISTORY OF 2019 NOVEL CORONAVIRUS DISEASE (COVID-19): ICD-10-CM

## 2020-08-25 LAB
T3FREE SERPL-MCNC: 3.13 PG/ML (ref 2.4–4.2)
T4 FREE SERPL-MCNC: 1 NG/DL (ref 0.8–1.7)
TSI SER-ACNC: 3.73 MIU/ML (ref 0.36–3.74)

## 2020-08-25 PROCEDURE — 84481 FREE ASSAY (FT-3): CPT

## 2020-08-25 PROCEDURE — 84439 ASSAY OF FREE THYROXINE: CPT

## 2020-08-25 PROCEDURE — 36415 COLL VENOUS BLD VENIPUNCTURE: CPT

## 2020-08-25 PROCEDURE — 71046 X-RAY EXAM CHEST 2 VIEWS: CPT | Performed by: PHYSICIAN ASSISTANT

## 2020-08-25 PROCEDURE — 84443 ASSAY THYROID STIM HORMONE: CPT

## 2020-08-26 ENCOUNTER — PATIENT MESSAGE (OUTPATIENT)
Dept: ENDOCRINOLOGY CLINIC | Facility: CLINIC | Age: 55
End: 2020-08-26

## 2020-08-26 DIAGNOSIS — E05.90 HYPERTHYROIDISM: Primary | ICD-10-CM

## 2020-09-02 ENCOUNTER — PATIENT MESSAGE (OUTPATIENT)
Dept: OTOLARYNGOLOGY | Facility: CLINIC | Age: 55
End: 2020-09-02

## 2020-09-03 NOTE — TELEPHONE ENCOUNTER
From: Aimee Cardoso  To: Cholo Rodriguez MD  Sent: 9/2/2020 10:09 AM CDT  Subject: Non-Urgent Medical Question    Hello    I'm having nosebleeds again. Do you cauterize? I had it done at [facility] about 3 yrs ago however the nosebleeds are happening again.  Arian

## 2020-09-04 ENCOUNTER — OFFICE VISIT (OUTPATIENT)
Dept: OTOLARYNGOLOGY | Facility: CLINIC | Age: 55
End: 2020-09-04

## 2020-09-04 ENCOUNTER — APPOINTMENT (OUTPATIENT)
Dept: LAB | Facility: REFERENCE LAB | Age: 55
End: 2020-09-04
Attending: INTERNAL MEDICINE
Payer: COMMERCIAL

## 2020-09-04 VITALS
BODY MASS INDEX: 45.99 KG/M2 | DIASTOLIC BLOOD PRESSURE: 80 MMHG | HEART RATE: 89 BPM | WEIGHT: 293 LBS | SYSTOLIC BLOOD PRESSURE: 141 MMHG | HEIGHT: 67 IN | TEMPERATURE: 98 F

## 2020-09-04 DIAGNOSIS — U07.1 COVID-19: ICD-10-CM

## 2020-09-04 DIAGNOSIS — R04.0 EPISTAXIS: Primary | ICD-10-CM

## 2020-09-04 LAB — SARS-COV-2 IGG SERPLBLD QL IA.RAPID: POSITIVE

## 2020-09-04 PROCEDURE — 3008F BODY MASS INDEX DOCD: CPT | Performed by: OTOLARYNGOLOGY

## 2020-09-04 PROCEDURE — 99213 OFFICE O/P EST LOW 20 MIN: CPT | Performed by: OTOLARYNGOLOGY

## 2020-09-04 PROCEDURE — 36415 COLL VENOUS BLD VENIPUNCTURE: CPT

## 2020-09-04 PROCEDURE — 3077F SYST BP >= 140 MM HG: CPT | Performed by: OTOLARYNGOLOGY

## 2020-09-04 PROCEDURE — 86769 SARS-COV-2 COVID-19 ANTIBODY: CPT

## 2020-09-04 PROCEDURE — 3079F DIAST BP 80-89 MM HG: CPT | Performed by: OTOLARYNGOLOGY

## 2020-09-04 PROCEDURE — 30901 CONTROL OF NOSEBLEED: CPT | Performed by: OTOLARYNGOLOGY

## 2020-09-04 NOTE — PROGRESS NOTES
Ozzie Nguyen is a 54year old female. Patient presents with:  Epistaxis: c/o of right epistaxis  has had 4-5 episodes in the last 2 wks.        HISTORY OF PRESENT ILLNESS  6/17/2020  Patient  presents with popping and pressure in her ears this is been goi Alcohol use: No      Drug use: No      Sexual activity: Not on file    Lifestyle      Physical activity:        Days per week: Not on file        Minutes per session: Not on file      Stress: Not on file    Relationships      Social connections: WIRE 1 SITE RIGHT (RIT=26701)           REVIEW OF SYSTEMS    System Neg/Pos Details   Constitutional Negative fever, weight loss. ENMT Negative Headache neck discomfort   Eyes Negative Blurred vision and vision changes.    Respiratory Negative Dyspnea and nasal septum anesthetized with topical lidocaine    right nasal septal vessel cauterized with silver nitrate. No further bleeding noted.   Packing: none  Complications: none    Current Outpatient Medications:   •  Fluticasone Furoate-Vilanterol (BREO ELLIPT occurs. .  Recommend: ointment twice a day for 7 days intranasally,  humidifier, avoidance of nose blowing and avoidance of anti-inflammatory drugs.  I also instructed the patient in the appropriate way to stop the nosebleed by pinching the fleshy part of th

## 2020-09-24 ENCOUNTER — TELEPHONE (OUTPATIENT)
Dept: PULMONOLOGY | Facility: CLINIC | Age: 55
End: 2020-09-24

## 2020-09-24 RX ORDER — ALBUTEROL SULFATE 2.5 MG/3ML
2.5 SOLUTION RESPIRATORY (INHALATION) EVERY 6 HOURS PRN
Qty: 1 BOTTLE | Refills: 5 | Status: SHIPPED | OUTPATIENT
Start: 2020-09-24

## 2020-09-24 NOTE — TELEPHONE ENCOUNTER
Pt stts rarely had SOB prior to COVID-19. She c/o slight SOB 1-2 times/wk since COVID-19. Denies any other sx. Pt taking Breo daily, Ventolin prn, & out of albuterol sulfate neb soln x 9 mo.  Per pt had recent CXR, did not understand CXR results given by Marcial

## 2020-09-24 NOTE — TELEPHONE ENCOUNTER
Pt was hospitalized at [facility] and seen by Wale Leiva for covid in mid July. Pt requesting to speak with Dr. Lisa Lance regarding XR CHEST results completed on 8/25/20. Pt states she continues to experience some shortness of breath.  Please call [phone]

## 2020-10-16 RX ORDER — TRIAMTERENE AND HYDROCHLOROTHIAZIDE 37.5; 25 MG/1; MG/1
CAPSULE ORAL
Qty: 90 CAPSULE | Refills: 1 | Status: SHIPPED | OUTPATIENT
Start: 2020-10-16 | End: 2021-04-20

## 2020-10-17 ENCOUNTER — HOSPITAL ENCOUNTER (OUTPATIENT)
Dept: CV DIAGNOSTICS | Facility: HOSPITAL | Age: 55
Discharge: HOME | End: 2020-10-17
Attending: PHYSICIAN ASSISTANT
Payer: COMMERCIAL

## 2020-10-17 ENCOUNTER — LAB ENCOUNTER (OUTPATIENT)
Dept: LAB | Facility: HOSPITAL | Age: 55
End: 2020-10-17
Attending: PHYSICIAN ASSISTANT
Payer: COMMERCIAL

## 2020-10-17 DIAGNOSIS — R94.2 ABNORMAL PFTS: ICD-10-CM

## 2020-10-17 DIAGNOSIS — E05.90 HYPERTHYROIDISM: ICD-10-CM

## 2020-10-17 PROCEDURE — 36415 COLL VENOUS BLD VENIPUNCTURE: CPT

## 2020-10-17 PROCEDURE — 93306 TTE W/DOPPLER COMPLETE: CPT | Performed by: PHYSICIAN ASSISTANT

## 2020-10-17 PROCEDURE — 84439 ASSAY OF FREE THYROXINE: CPT

## 2020-10-17 PROCEDURE — 84443 ASSAY THYROID STIM HORMONE: CPT

## 2020-10-17 PROCEDURE — 84481 FREE ASSAY (FT-3): CPT

## 2020-10-21 ENCOUNTER — OFFICE VISIT (OUTPATIENT)
Dept: ENDOCRINOLOGY CLINIC | Facility: CLINIC | Age: 55
End: 2020-10-21

## 2020-10-21 VITALS
HEIGHT: 67 IN | DIASTOLIC BLOOD PRESSURE: 82 MMHG | RESPIRATION RATE: 18 BRPM | BODY MASS INDEX: 45.99 KG/M2 | HEART RATE: 74 BPM | WEIGHT: 293 LBS | SYSTOLIC BLOOD PRESSURE: 137 MMHG

## 2020-10-21 DIAGNOSIS — E05.90 HYPERTHYROIDISM: Primary | ICD-10-CM

## 2020-10-21 PROCEDURE — 3008F BODY MASS INDEX DOCD: CPT | Performed by: INTERNAL MEDICINE

## 2020-10-21 PROCEDURE — 3075F SYST BP GE 130 - 139MM HG: CPT | Performed by: INTERNAL MEDICINE

## 2020-10-21 PROCEDURE — 99213 OFFICE O/P EST LOW 20 MIN: CPT | Performed by: INTERNAL MEDICINE

## 2020-10-21 PROCEDURE — 3079F DIAST BP 80-89 MM HG: CPT | Performed by: INTERNAL MEDICINE

## 2020-10-21 NOTE — PROGRESS NOTES
Return Office Visit     CHIEF COMPLAINT:  Patient presents with: Follow - Up: fu visit for thyroid nodule. Pt reports some improvement since being off thyroid medication.         HISTORY OF PRESENT ILLNESS:  Whitney Devine is a 54year old female who prese Inhalation Aero Soln Inhale into the lungs.            ALLERGY:    Codeine                 JITTERY, OTHER (SEE COMMENTS)  Nitroglycerin           OTHER (SEE COMMENTS)    Comment:mirgraine/headaches             mirgraine/headaches  Lactose                 OT rate   ABDOMEN:  soft  SKIN:  no bruising or bleeding, no rashes and no lesions  EXTREMITIES: no edema      DATA:     Pertinent data reviewed    ASSESSMENT AND PLAN:    Patient is a 54year old female with   1. Hyperthyroidism.    Unclear cause  NM thyroid

## 2020-11-23 ENCOUNTER — OFFICE VISIT (OUTPATIENT)
Dept: INTERNAL MEDICINE CLINIC | Facility: CLINIC | Age: 55
End: 2020-11-23

## 2020-11-23 ENCOUNTER — PATIENT MESSAGE (OUTPATIENT)
Dept: INTERNAL MEDICINE CLINIC | Facility: CLINIC | Age: 55
End: 2020-11-23

## 2020-11-23 VITALS
WEIGHT: 293 LBS | OXYGEN SATURATION: 97 % | HEART RATE: 79 BPM | HEIGHT: 67 IN | DIASTOLIC BLOOD PRESSURE: 84 MMHG | BODY MASS INDEX: 45.99 KG/M2 | SYSTOLIC BLOOD PRESSURE: 130 MMHG

## 2020-11-23 DIAGNOSIS — Z12.31 SCREENING MAMMOGRAM, ENCOUNTER FOR: ICD-10-CM

## 2020-11-23 DIAGNOSIS — R10.30 INGUINAL PAIN, UNSPECIFIED LATERALITY: ICD-10-CM

## 2020-11-23 DIAGNOSIS — R10.2 PAIN IN FEMALE PELVIS: Primary | ICD-10-CM

## 2020-11-23 DIAGNOSIS — I10 ESSENTIAL HYPERTENSION: ICD-10-CM

## 2020-11-23 PROCEDURE — 3075F SYST BP GE 130 - 139MM HG: CPT | Performed by: INTERNAL MEDICINE

## 2020-11-23 PROCEDURE — 99213 OFFICE O/P EST LOW 20 MIN: CPT | Performed by: INTERNAL MEDICINE

## 2020-11-23 PROCEDURE — 3079F DIAST BP 80-89 MM HG: CPT | Performed by: INTERNAL MEDICINE

## 2020-11-23 PROCEDURE — 3008F BODY MASS INDEX DOCD: CPT | Performed by: INTERNAL MEDICINE

## 2020-11-23 NOTE — PROGRESS NOTES
Angelique Biggs is a 54year old female.     Chief complaint: groin pain       HPI:     Angelique Biggs is a 54year old female who presents for groin pain       Patient reports groin pain  Left side   Started 6 month ago   8/10   Constant pain mostly   No alverto Past Medical History:   Diagnosis Date   • Arthritis    • Asthma    • Colon adenomas 12/02/2019   • Colon polyp 2014   • Constipation 1965    Had it since childhood   • Disorder of thyroid    • Diverticular disease 2014   • Esophageal reflux 2005   • no rashes,no suspicious lesions  HEENT: atraumatic, normocephalic  NECK: supple,no adenopathy  LUNGS: clear to auscultation  CARDIO: RRR without murmur  GI: good BS's,no masses, HSM  Slight tenderness in suprapubic area no guarding or rigidity   No palpabl

## 2020-12-08 ENCOUNTER — HOSPITAL ENCOUNTER (OUTPATIENT)
Dept: CT IMAGING | Facility: HOSPITAL | Age: 55
Discharge: HOME | End: 2020-12-08
Attending: INTERNAL MEDICINE
Payer: COMMERCIAL

## 2020-12-08 ENCOUNTER — HOSPITAL ENCOUNTER (OUTPATIENT)
Dept: MAMMOGRAPHY | Facility: HOSPITAL | Age: 55
Discharge: HOME | End: 2020-12-08
Attending: INTERNAL MEDICINE
Payer: COMMERCIAL

## 2020-12-08 DIAGNOSIS — R10.2 PAIN IN FEMALE PELVIS: ICD-10-CM

## 2020-12-08 DIAGNOSIS — R10.30 INGUINAL PAIN, UNSPECIFIED LATERALITY: ICD-10-CM

## 2020-12-08 DIAGNOSIS — Z12.31 SCREENING MAMMOGRAM, ENCOUNTER FOR: ICD-10-CM

## 2020-12-08 PROCEDURE — 72193 CT PELVIS W/DYE: CPT | Performed by: INTERNAL MEDICINE

## 2020-12-08 PROCEDURE — 77063 BREAST TOMOSYNTHESIS BI: CPT | Performed by: INTERNAL MEDICINE

## 2020-12-08 PROCEDURE — 82565 ASSAY OF CREATININE: CPT

## 2020-12-08 PROCEDURE — 77067 SCR MAMMO BI INCL CAD: CPT | Performed by: INTERNAL MEDICINE

## 2020-12-15 ENCOUNTER — TELEPHONE (OUTPATIENT)
Dept: INTERNAL MEDICINE CLINIC | Facility: CLINIC | Age: 55
End: 2020-12-15

## 2020-12-15 DIAGNOSIS — R73.03 PREDIABETES: Primary | ICD-10-CM

## 2020-12-15 RX ORDER — FLUTICASONE FUROATE AND VILANTEROL TRIFENATATE 100; 25 UG/1; UG/1
1 POWDER RESPIRATORY (INHALATION) DAILY
Qty: 3 EACH | Refills: 2 | Status: SHIPPED | OUTPATIENT
Start: 2020-12-15 | End: 2021-05-28 | Stop reason: ALTCHOICE

## 2020-12-16 ENCOUNTER — TELEPHONE (OUTPATIENT)
Dept: CASE MANAGEMENT | Age: 55
End: 2020-12-16

## 2020-12-16 NOTE — TELEPHONE ENCOUNTER
Insurance prefers Advair Diskus inhaler over [employer]. Spoke with patient stating she would like to do some research on the Advair diskus prior to switching.  She is also thinking she may not need the Breo anymore and was wanting to discuss that wit

## 2020-12-17 RX ORDER — ALBUTEROL SULFATE 90 UG/1
2 AEROSOL, METERED RESPIRATORY (INHALATION) EVERY 6 HOURS PRN
Qty: 1 INHALER | Refills: 2 | Status: SHIPPED | OUTPATIENT
Start: 2020-12-17

## 2020-12-23 ENCOUNTER — LAB ENCOUNTER (OUTPATIENT)
Dept: LAB | Facility: REFERENCE LAB | Age: 55
End: 2020-12-23
Attending: INTERNAL MEDICINE
Payer: COMMERCIAL

## 2020-12-23 DIAGNOSIS — R73.03 PREDIABETES: ICD-10-CM

## 2020-12-23 PROCEDURE — 84481 FREE ASSAY (FT-3): CPT

## 2020-12-23 PROCEDURE — 80061 LIPID PANEL: CPT

## 2020-12-23 PROCEDURE — 83036 HEMOGLOBIN GLYCOSYLATED A1C: CPT

## 2020-12-23 PROCEDURE — 80053 COMPREHEN METABOLIC PANEL: CPT

## 2020-12-23 PROCEDURE — 84443 ASSAY THYROID STIM HORMONE: CPT

## 2020-12-23 PROCEDURE — 85025 COMPLETE CBC W/AUTO DIFF WBC: CPT

## 2020-12-23 PROCEDURE — 84439 ASSAY OF FREE THYROXINE: CPT

## 2020-12-23 PROCEDURE — 36415 COLL VENOUS BLD VENIPUNCTURE: CPT

## 2020-12-29 DIAGNOSIS — R74.8 ELEVATED ALKALINE PHOSPHATASE LEVEL: ICD-10-CM

## 2020-12-29 DIAGNOSIS — K82.4 GALL BLADDER POLYP: Primary | ICD-10-CM

## 2021-01-27 ENCOUNTER — PATIENT MESSAGE (OUTPATIENT)
Dept: INTERNAL MEDICINE CLINIC | Facility: CLINIC | Age: 56
End: 2021-01-27

## 2021-01-27 DIAGNOSIS — R70.0 ESR RAISED: ICD-10-CM

## 2021-01-27 DIAGNOSIS — R79.82 CRP ELEVATED: Primary | ICD-10-CM

## 2021-03-02 NOTE — TELEPHONE ENCOUNTER
Corine BedoyaRoosevelt, [place] 3/1/2021 10:39 AM CST      ----- Message -----  From: Sobeida Moody  Sent: 2/28/2021 9:08 AM CST  To: Jade Varela Clinical Staff  Subject: RE: Test Results Question     Hi Dr Helen Stoll     I have an appt Monday for bloodwork.  Can you add a

## 2021-03-05 ENCOUNTER — LAB ENCOUNTER (OUTPATIENT)
Dept: LAB | Age: 56
End: 2021-03-05
Attending: INTERNAL MEDICINE
Payer: COMMERCIAL

## 2021-03-05 DIAGNOSIS — E05.90 HYPERTHYROIDISM: ICD-10-CM

## 2021-03-05 DIAGNOSIS — R70.0 ESR RAISED: ICD-10-CM

## 2021-03-05 DIAGNOSIS — K82.4 GALL BLADDER POLYP: ICD-10-CM

## 2021-03-05 DIAGNOSIS — R79.82 CRP ELEVATED: ICD-10-CM

## 2021-03-05 DIAGNOSIS — R74.8 ELEVATED ALKALINE PHOSPHATASE LEVEL: ICD-10-CM

## 2021-03-05 LAB
CRP SERPL-MCNC: 1 MG/DL (ref ?–0.3)
ERYTHROCYTE [SEDIMENTATION RATE] IN BLOOD: 45 MM/HR
PTH-INTACT SERPL-MCNC: 78.1 PG/ML (ref 18.5–88)
T3FREE SERPL-MCNC: 2.59 PG/ML (ref 2.4–4.2)
T4 FREE SERPL-MCNC: 1.1 NG/DL (ref 0.8–1.7)
TSI SER-ACNC: 1.3 MIU/ML (ref 0.36–3.74)

## 2021-03-05 PROCEDURE — 86140 C-REACTIVE PROTEIN: CPT

## 2021-03-05 PROCEDURE — 82306 VITAMIN D 25 HYDROXY: CPT

## 2021-03-05 PROCEDURE — 85652 RBC SED RATE AUTOMATED: CPT

## 2021-03-05 PROCEDURE — 84443 ASSAY THYROID STIM HORMONE: CPT

## 2021-03-05 PROCEDURE — 84439 ASSAY OF FREE THYROXINE: CPT

## 2021-03-05 PROCEDURE — 83970 ASSAY OF PARATHORMONE: CPT

## 2021-03-05 PROCEDURE — 84481 FREE ASSAY (FT-3): CPT

## 2021-03-05 PROCEDURE — 36415 COLL VENOUS BLD VENIPUNCTURE: CPT

## 2021-03-05 PROCEDURE — 84080 ASSAY ALKALINE PHOSPHATASES: CPT

## 2021-03-05 PROCEDURE — 84075 ASSAY ALKALINE PHOSPHATASE: CPT

## 2021-03-07 ENCOUNTER — PATIENT OUTREACH (OUTPATIENT)
Dept: ENDOCRINOLOGY CLINIC | Facility: CLINIC | Age: 56
End: 2021-03-07

## 2021-03-08 ENCOUNTER — TELEPHONE (OUTPATIENT)
Dept: INTERNAL MEDICINE CLINIC | Facility: CLINIC | Age: 56
End: 2021-03-08

## 2021-03-08 LAB — 25(OH)D3 SERPL-MCNC: 15.9 NG/ML (ref 30–100)

## 2021-03-08 NOTE — TELEPHONE ENCOUNTER
Reached patient to discuss inhalers. Had spoken with patient last year regarding Breo vs Advair.  She was going to think about switching to Advair and possibly discuss with pulmonologist. She tells me at this point she would prefer to stick with the [facility] in

## 2021-03-09 LAB
ALK-PHOSPHATASE BONE CALC: 27 U/L
ALK-PHOSPHATASE LIVER CALC: 80 U/L
ALK-PHOSPHATASE OTHER CALC: 0 U/L
ALKALINE PHOSPHATASE: 106 U/L

## 2021-03-12 ENCOUNTER — PATIENT MESSAGE (OUTPATIENT)
Dept: INTERNAL MEDICINE CLINIC | Facility: CLINIC | Age: 56
End: 2021-03-12

## 2021-03-12 DIAGNOSIS — M21.619 BUNION: Primary | ICD-10-CM

## 2021-03-12 RX ORDER — ERGOCALCIFEROL 1.25 MG/1
50000 CAPSULE ORAL WEEKLY
Qty: 12 CAPSULE | Refills: 1 | Status: SHIPPED | OUTPATIENT
Start: 2021-03-12 | End: 2021-09-03

## 2021-03-13 NOTE — TELEPHONE ENCOUNTER
From: Grace Warner  To: Olivia Dukes MD  Sent: 3/12/2021 3:27 PM CST  Subject: Referral Request    Hi Dr Tahira Enrique    As you know I had bunion surgery back in 2018 however Dr Archana Hensley told me to get a second opinion in reference to the continued nerve sen

## 2021-03-20 DIAGNOSIS — Z23 NEED FOR VACCINATION: ICD-10-CM

## 2021-04-08 ENCOUNTER — TELEPHONE (OUTPATIENT)
Dept: PULMONOLOGY | Facility: CLINIC | Age: 56
End: 2021-04-08

## 2021-04-08 RX ORDER — PREDNISONE 20 MG/1
TABLET ORAL
Qty: 10 TABLET | Refills: 0 | Status: SHIPPED | OUTPATIENT
Start: 2021-04-08

## 2021-04-08 NOTE — TELEPHONE ENCOUNTER
Pt has slight discomfort when she breathes heavy - asking for an order for chest x ray and/or to eb  seen tomorrow

## 2021-04-08 NOTE — TELEPHONE ENCOUNTER
Spoke with patient states she has been short of breath for about a week and soreness upon inhalation. Denies fever, cough, chest pain, chest tightness. Uses Breo inhaler every morning, Ventolin inhaler 2 - 3x/week.   Aware Dr. Zoe Ingram not in office, advised

## 2021-04-20 ENCOUNTER — TELEPHONE (OUTPATIENT)
Dept: INTERNAL MEDICINE CLINIC | Facility: CLINIC | Age: 56
End: 2021-04-20

## 2021-04-20 RX ORDER — TRIAMTERENE AND HYDROCHLOROTHIAZIDE 37.5; 25 MG/1; MG/1
CAPSULE ORAL
Qty: 90 CAPSULE | Refills: 1 | Status: SHIPPED | OUTPATIENT
Start: 2021-04-20

## 2021-04-20 RX ORDER — TRIAMTERENE AND HYDROCHLOROTHIAZIDE 37.5; 25 MG/1; MG/1
1 CAPSULE ORAL DAILY
Qty: 90 CAPSULE | Refills: 1 | Status: SHIPPED | OUTPATIENT
Start: 2021-04-20 | End: 2021-10-25

## 2021-04-20 NOTE — TELEPHONE ENCOUNTER
Patient called as she needs a refill on Triamterene-hydrochlorothiazide 37.5-25 MG Oral Cap medication.

## 2021-04-20 NOTE — TELEPHONE ENCOUNTER
She's requesting med refill for HTN. FYI:  Patient has made many, many appts and then cancelled them. This time, she had one scheduled for June 1, cancelled and rescheduled for July 19.       Actual Completed office visits:         11/23/20   pain

## 2021-05-06 ENCOUNTER — ORDER TRANSCRIPTION (OUTPATIENT)
Dept: PHYSICAL THERAPY | Facility: HOSPITAL | Age: 56
End: 2021-05-06

## 2021-05-06 ENCOUNTER — PATIENT MESSAGE (OUTPATIENT)
Dept: INTERNAL MEDICINE CLINIC | Facility: CLINIC | Age: 56
End: 2021-05-06

## 2021-05-06 DIAGNOSIS — M76.821 POSTERIOR TIBIAL TENDINITIS OF RIGHT LEG: ICD-10-CM

## 2021-05-06 DIAGNOSIS — M20.41 ACQUIRED HAMMER TOE OF RIGHT FOOT: ICD-10-CM

## 2021-05-06 DIAGNOSIS — M76.822 POSTERIOR TIBIAL TENDINITIS OF LEFT LEG: ICD-10-CM

## 2021-05-06 DIAGNOSIS — M76.61 TENDONITIS, ACHILLES, RIGHT: Primary | ICD-10-CM

## 2021-05-07 ENCOUNTER — APPOINTMENT (OUTPATIENT)
Dept: PHYSICAL THERAPY | Facility: HOSPITAL | Age: 56
End: 2021-05-07
Attending: PODIATRIST
Payer: COMMERCIAL

## 2021-05-07 DIAGNOSIS — M54.50 LOW BACK PAIN, UNSPECIFIED BACK PAIN LATERALITY, UNSPECIFIED CHRONICITY, UNSPECIFIED WHETHER SCIATICA PRESENT: Primary | ICD-10-CM

## 2021-05-10 ENCOUNTER — APPOINTMENT (OUTPATIENT)
Dept: PHYSICAL THERAPY | Facility: HOSPITAL | Age: 56
End: 2021-05-10
Attending: PODIATRIST
Payer: COMMERCIAL

## 2021-05-11 ENCOUNTER — OFFICE VISIT (OUTPATIENT)
Dept: PHYSICAL THERAPY | Facility: HOSPITAL | Age: 56
End: 2021-05-11
Attending: PODIATRIST
Payer: COMMERCIAL

## 2021-05-11 PROCEDURE — 97140 MANUAL THERAPY 1/> REGIONS: CPT | Performed by: PHYSICAL THERAPIST

## 2021-05-11 PROCEDURE — 97163 PT EVAL HIGH COMPLEX 45 MIN: CPT | Performed by: PHYSICAL THERAPIST

## 2021-05-11 NOTE — PROGRESS NOTES
LE foot and LYMPHEDEMA EVALUATION:     Referring Physician: Dr. Gladys Alexander  Diagnosis: <76.61 R achilles tendonitis, M20.41 R hammer toes, M76.821 R post tibial tendonitis, M20.21 R Hallux rigidus    Date of Onset: 2018 Evaluation Date: 5/11/2021     PATIENT strength, and swelling. Patient w/ swelling mostly R foot/toes but also noted in BLEs. Patient stating she has had BLE swelling \"for years\".  Patient swelling is consistent w/ lymphedema as she has risk factors for developing lymphedema (venous insufficie Gait: WFL     Bed mobility/transfers: independnet    Edema/Tissue Observations:   - mild swelling B lower legs  - mild swelling L foot/toes  - mod swelling R foot toes (slight pitting  - decreased scar mobility right 1st toe, adhered  - significant varic compression options including various vendors that can be utilized        Charges: Physical Therapy Eval: High Complexity, mm2      Total Timed Treatment: 25 min     Total Treatment Time: 90 min     Based on clinical rationale and outcome measures, this ev Training      Patient/Family/Caregiver was advised of these findings, precautions, and treatment options and has agreed to actively participate in planning and for this course of care.     Thank you for your referral. Please co-sign or sign and return this

## 2021-05-14 ENCOUNTER — OFFICE VISIT (OUTPATIENT)
Dept: PHYSICAL THERAPY | Facility: HOSPITAL | Age: 56
End: 2021-05-14
Attending: PODIATRIST
Payer: COMMERCIAL

## 2021-05-14 PROCEDURE — 97110 THERAPEUTIC EXERCISES: CPT

## 2021-05-14 PROCEDURE — 97140 MANUAL THERAPY 1/> REGIONS: CPT

## 2021-05-14 NOTE — PROGRESS NOTES
Referring Physician: Dr. Ondina Morrow  Diagnosis: <76.61 R achilles tendonitis, M20.41 R hammer toes, M76.821 R post tibial tendonitis, M20.21 R Hallux rigidus     Date of Onset: 2018 Evaluation Date: 5/11/2021          Physical Therapy Lymphedema Daily Note toe     Gait: WFL      Bed mobility/transfers: independnet     Edema/Tissue Observations:   - mild swelling B lower legs  - mild swelling L foot/toes  - mod swelling R foot toes (slight pitting  - decreased scar mobility right 1st toe, adhered  - significa self MLD, handout printed and discussed to perform 1xday.    -R great toe scar tissue massage, pt hyper sensitive.   (Pt has numbing cream that MD gave her and she will put this on scar prior to PT session)  Great toe joint mob gr 2  STM - pressure points t of 5% to decrease risk of infections  4) Patient to be independent with donning/doffing compression garments, HEP, MLD, and lymphedema risk reduction practices to be able to self manage symptoms  5) Increase strength of R ankle to 5/5 to decrease pain walk

## 2021-05-14 NOTE — PATIENT INSTRUCTIONS
HOME PROGRAM:    What is the lymphatic system? Your lymphatic system removes fluid build-up and waste from your body and plays an important role in your immune function. It is made up of lymph nodes that are connected by lymph vessels.     Large groups or shoulders, neck, arm or hand   • Do not self-massage in a way that causes pain   • Do not continue self-massage if it is causing you pain   • Do not self-massage if you have an infection in that area     Important: Do not do self-massage if you have an inf position. • Massage down and inwards towards your collarbone. Always keep your fingers above your collarbone. Gently stretch the skin just as far as it naturally goes and release. • This massage will look like two “J” strokes facing one another.    • Re your hip. • Shift your hands lower on your leg and repeat. Keep shifting down and repeating this step until you reach your knee as shown in the pictures. • Now place your hand on the outside of your knee.  Switching between your left and right hands, st

## 2021-05-17 ENCOUNTER — APPOINTMENT (OUTPATIENT)
Dept: PHYSICAL THERAPY | Facility: HOSPITAL | Age: 56
End: 2021-05-17
Attending: PODIATRIST
Payer: COMMERCIAL

## 2021-05-19 ENCOUNTER — OFFICE VISIT (OUTPATIENT)
Dept: PHYSICAL THERAPY | Facility: HOSPITAL | Age: 56
End: 2021-05-19
Attending: PODIATRIST
Payer: COMMERCIAL

## 2021-05-19 PROCEDURE — 97140 MANUAL THERAPY 1/> REGIONS: CPT

## 2021-05-19 NOTE — PROGRESS NOTES
Referring Physician: Dr. Tuan Ro  Diagnosis: <76.61 R achilles tendonitis, M20.41 R hammer toes, M76.821 R post tibial tendonitis, M20.21 R Hallux rigidus     Date of Onset: 2018 Evaluation Date: 5/11/2021          Physical Therapy Lymphedema Daily Note 40, L 45                             Ankle IV/EV WFL                              1st Toe MTP Flex: R: 0, L 23                            1st Toe MTP ext: R 55, L 55                            1st Toe DIP flex/ext WFL                             2-5th toes MEASUREMENT J 64.5 67.8   MEASUREMENT K 70.3 74.3   MEASUREMENT L 75.1 77.8   MEASUREMENT M 78.8 82.3    TOTAL VOLUME  [id number]                           Lymphedema Life Impact Scale: Pt did not complete        Today’s Treatment and Respon but had 1 8cm short stretch bandage in her purse.    -elastomul x 1 toe wrap  -stockinette for foot/ankle  -8cm comprilan   -sample medi anklets given to patient. *pt was not re-rolling the wraps prior to re-bandaging.   Explained/demo'd that it is import foot swelling to be within 0.2 cm for the L to allow less difficulty wearing shoes  3) Decrease BLE volume by a combined total of 5% to decrease risk of infections  4) Patient to be independent with donning/doffing compression garments, HEP, MLD, and lymph

## 2021-05-21 ENCOUNTER — OFFICE VISIT (OUTPATIENT)
Dept: PHYSICAL THERAPY | Facility: HOSPITAL | Age: 56
End: 2021-05-21
Attending: PODIATRIST
Payer: COMMERCIAL

## 2021-05-21 PROCEDURE — 97140 MANUAL THERAPY 1/> REGIONS: CPT

## 2021-05-21 PROCEDURE — 97110 THERAPEUTIC EXERCISES: CPT

## 2021-05-21 NOTE — PROGRESS NOTES
Referring Physician: Dr. Zuly Hough  Diagnosis: <76.61 R achilles tendonitis, M20.41 R hammer toes, M76.821 R post tibial tendonitis, M20.21 R Hallux rigidus     Date of Onset: 2018 Evaluation Date: 5/11/2021          Physical Therapy Lymphedema Daily Note 2-5th toes AROM WFL except R 2nd toe slightly limited due to hammer toe                            Knees WFL                            Hips WFL                LE strength:                              Ankle DF: R 4+/5, L 5/5 Treatment and Response:   Date 5/11/2021 5/14/2021 5/19/2021 5/21/2021   Visit # 1/8  HMO Visit: 2/8  HMO Visit 3/8  HMO Visit 4/8  HMO   Manual Therapy (25 minutes)        Compression:  - donned sample custom knee high to demonstrate difference between f re-bandaging. Explained/demo'd that it is important to bandage with a rolled bandage to provide adequate compression. Explained that she will need to take off the these anklets if they cut into her ankle causing indurations.   Pt verbalized understanding              Assessment: Progressed bandaging to included lower leg. Home pump demo will be set up. Will assess if patient will be ready for custom compression fitting anticipate within the next 2 weeks.   Pt will benefit from custom compression and h

## 2021-05-24 ENCOUNTER — TELEPHONE (OUTPATIENT)
Dept: PHYSICAL THERAPY | Facility: HOSPITAL | Age: 56
End: 2021-05-24

## 2021-05-25 ENCOUNTER — APPOINTMENT (OUTPATIENT)
Dept: PHYSICAL THERAPY | Facility: HOSPITAL | Age: 56
End: 2021-05-25
Attending: PODIATRIST
Payer: COMMERCIAL

## 2021-05-25 ENCOUNTER — TELEPHONE (OUTPATIENT)
Dept: PHYSICAL THERAPY | Facility: HOSPITAL | Age: 56
End: 2021-05-25

## 2021-05-25 PROCEDURE — 97140 MANUAL THERAPY 1/> REGIONS: CPT | Performed by: PHYSICAL THERAPIST

## 2021-05-25 NOTE — TELEPHONE ENCOUNTER
MARS Adhikari,   I gave the nurse the orders to place. Im including her in the message as well.   Im will be out of the office tomorrow until Jun 1st But Dr Jordan García is covering for me if you have any Q or concern

## 2021-05-25 NOTE — PROGRESS NOTES
Referring Physician: Dr. Leeanne Lowry  Diagnosis: <76.61 R achilles tendonitis, M20.41 R hammer toes, M76.821 R post tibial tendonitis, M20.21 R Hallux rigidus     Date of Onset: 2018 Evaluation Date: 5/11/2021          Physical Therapy Lymphedema Daily Note 2-5th toes AROM WFL except R 2nd toe slightly limited due to hammer toe                            Knees WFL                            Hips WFL                LE strength:                              Ankle DF: R 4+/5, L 5/5 complete        Today’s Treatment and Response:   Date 5/11/2021 5/14/2021 5/19/2021 5/21/2021 5/25/2021   Visit # 1/8  HMO Visit: 2/8  HMO Visit 3/8  HMO Visit 4/8  HMO Visit 5/8  HMO   Manual Therapy (25 minutes)        Compression:  - donned sample cus *pt was not re-rolling the wraps prior to re-bandaging. Explained/demo'd that it is important to bandage with a rolled bandage to provide adequate compression.    Explained that she will need to take off the these anklets if they cut into her ankle causi session.  There Ex (10 minutes)  -encouraged foot/ankle/knee/hip exercises to be performed with bandaging on.  -encouraged pt to continue with her walking program.  -nustep BUE/BLE L5 x 10 minutes There Ex (0 min)  deferred   Self-Care:   Management/Educati Timed Treatment: 75 min  Total Treatment Time: 85 min

## 2021-05-25 NOTE — TELEPHONE ENCOUNTER
Hi Dr Awan Arrington,   We are ready to get the HMO referrals compression garments and a home pump for Krysten.  The referral for the pump will be requested by the company [employer].     But for the compression garments I need you to put the DME HMO referra

## 2021-05-28 ENCOUNTER — OFFICE VISIT (OUTPATIENT)
Dept: PHYSICAL THERAPY | Facility: HOSPITAL | Age: 56
End: 2021-05-28
Attending: PODIATRIST
Payer: COMMERCIAL

## 2021-05-28 ENCOUNTER — TELEPHONE (OUTPATIENT)
Dept: INTERNAL MEDICINE CLINIC | Facility: CLINIC | Age: 56
End: 2021-05-28

## 2021-05-28 PROCEDURE — 97110 THERAPEUTIC EXERCISES: CPT | Performed by: PHYSICAL THERAPIST

## 2021-05-28 PROCEDURE — 97140 MANUAL THERAPY 1/> REGIONS: CPT | Performed by: PHYSICAL THERAPIST

## 2021-05-28 NOTE — PROGRESS NOTES
Referring Physician: Dr. Chrissy Isaac  Diagnosis: <76.61 R achilles tendonitis, M20.41 R hammer toes, M76.821 R post tibial tendonitis, M20.21 R Hallux rigidus     Date of Onset: 2018 Evaluation Date: 5/11/2021          Physical Therapy Lymphedema Daily Note lower legs      5/19/2021  Sensation:  Hypersensitivity R 1st toe     Measurements: (decrease circumference measurements noted compared to initial evaluation measurements)  Base of 1st toe: R 9.1 cm  Base of 2nd toe: R 5.8 cm  Base of 3rd toe: R 4.9 cm  Me cuticle:R 25.8, L 25.1     Stemmer's Sign: + bilaterally      Leg Volume Measurements:  Lymphedema Calculations 5/11/2021 5/11/2021   DATE MEASURED 5/11/2021 5/11/2021   LOCATION/MEASUREMENTS LLE RLE   PATIENT POSITION  supine supine   LIMB POSITION extend rest of her leg and her L foot/leg when she gets home. Applied tensogrip: left foot/ankle size D, R leg size E. Manual therapy (45 minutes)    MLD:  MLD for BLE: neck sequence, abdominal sequence, deep breathing, B inguinal, B LE's.     Compression:  - B t period, decreasing frequency as symptoms improve.      Treatment will include: Complete Decongestive Therapy: Manual Therapy, Self-Care/Home Management Training, Therapeutic Exercise, Neuromuscular Re-education, Orthotic Management and Training

## 2021-05-28 NOTE — TELEPHONE ENCOUNTER
Received incoming call from patient to discuss inhaler. Had spoken to patient several months ago regarding inhalers and switching from Albesa Apurva to Advair due to insurance formulary.  Patient is now ready to try switching to the Advair Diskus due to cost of

## 2021-06-01 ENCOUNTER — TELEPHONE (OUTPATIENT)
Dept: INTERNAL MEDICINE CLINIC | Facility: CLINIC | Age: 56
End: 2021-06-01

## 2021-06-01 RX ORDER — FLUTICASONE PROPIONATE AND SALMETEROL 100; 50 UG/1; UG/1
1 POWDER RESPIRATORY (INHALATION) 2 TIMES DAILY
Qty: 1 EACH | Refills: 2 | Status: SHIPPED | OUTPATIENT
Start: 2021-06-01 | End: 2021-08-27

## 2021-06-01 NOTE — TELEPHONE ENCOUNTER
PT LVM ON NURSELINE:    Pt would like update on pending referral/review for DME.       Elpidio Schrader

## 2021-06-01 NOTE — TELEPHONE ENCOUNTER
THIS REFERRAL DOES NOT AUTHORIZE ANY DIAGNOSTIC TESTING OR SURGICAL PROCEDURES, UNLESS SPECIFICALLY LISTED BELOW. REQUEST FOR DIAGNOSTIC TESTING OR SURGERY MUST BE REFERRED BACK TO THE PRIMARY CARE PHYSICIAN OR THE REFERRING PHYSICIAN.      PATIENT NAME:

## 2021-06-02 ENCOUNTER — OFFICE VISIT (OUTPATIENT)
Dept: PHYSICAL THERAPY | Facility: HOSPITAL | Age: 56
End: 2021-06-02
Attending: PODIATRIST
Payer: COMMERCIAL

## 2021-06-02 ENCOUNTER — TELEPHONE (OUTPATIENT)
Dept: PHYSICAL THERAPY | Facility: HOSPITAL | Age: 56
End: 2021-06-02

## 2021-06-02 PROCEDURE — 97035 APP MDLTY 1+ULTRASOUND EA 15: CPT | Performed by: PHYSICAL THERAPIST

## 2021-06-02 PROCEDURE — 97140 MANUAL THERAPY 1/> REGIONS: CPT | Performed by: PHYSICAL THERAPIST

## 2021-06-02 NOTE — PROGRESS NOTES
Referring Physician: Dr. Mason Alfred  Diagnosis: <76.61 R achilles tendonitis, M20.41 R hammer toes, M76.821 R post tibial tendonitis, M20.21 R Hallux rigidus     Date of Onset: 2018 Evaluation Date: 5/11/2021      Progress Summary    Pt has attended 7 visit occasional \"zingers\" 2-3/10 R great toe pain (5/25/2021: pt reporting no changes in pain    Subjective:  \"The swelling is less when I take off the wraps, but it comes right back\"      Objective:    6/2/2021:  Swelling of toes (especially the right)  R l Dash PF: R 40, L 45                             Ankle IV/EV WFL                              1st Toe MTP Flex: R: 0, L 23                            1st Toe MTP ext: R 55, L 55                            1st Toe DIP flex/ext [facility] MEASUREMENT I 52 52.8   MEASUREMENT J 64.5 67.8   MEASUREMENT K 70.3 74.3   MEASUREMENT L 75.1 77.8   MEASUREMENT M 78.8 82.3    TOTAL VOLUME  [id number]                           Lymphedema Life Impact Scale: Pt did not complete        Pepe Miller fitting: custom B knee highs and toe caps (Elvarex soft CCL2)  - B toe wraps w/ elastomull  - R foot/lower leg comprilan (x2)  - L foot medi compression anklet   There Ex (10 minutes)  -encouraged foot/ankle/knee/hip exercises to be performed with bandagin Therapy: Manual Therapy, Self-Care/Home Management Training, Therapeutic Exercise, Neuromuscular Re-education, Orthotic Management and Training              Charges: MM4, Ultrasound Total Timed Treatment: 75  min  Total Treatment Time: 75 min

## 2021-06-03 NOTE — TELEPHONE ENCOUNTER
Reached patient and let her know PCP did sent Advair script to preferred pharmacy. Patient tells me she just spoke to pharmacy last evening and they did tell her it was ready. She plans to  today and start using.  She will update me in 1 month to let

## 2021-06-04 ENCOUNTER — OFFICE VISIT (OUTPATIENT)
Dept: PHYSICAL THERAPY | Facility: HOSPITAL | Age: 56
End: 2021-06-04
Attending: PODIATRIST
Payer: COMMERCIAL

## 2021-06-04 ENCOUNTER — TELEPHONE (OUTPATIENT)
Dept: PHYSICAL THERAPY | Facility: HOSPITAL | Age: 56
End: 2021-06-04

## 2021-06-04 PROCEDURE — 97140 MANUAL THERAPY 1/> REGIONS: CPT | Performed by: PHYSICAL THERAPIST

## 2021-06-04 PROCEDURE — 97035 APP MDLTY 1+ULTRASOUND EA 15: CPT | Performed by: PHYSICAL THERAPIST

## 2021-06-04 NOTE — PROGRESS NOTES
Referring Physician: Dr. Caryn Chase  Diagnosis: <76.61 R achilles tendonitis, M20.41 R hammer toes, M76.821 R post tibial tendonitis, M20.21 R Hallux rigidus     Date of Onset: 2018 Evaluation Date: 5/11/2021             Physical Therapy Lymphedema Daily No 52. 6   MEASUREMENT H 52.1 54.3 52.1 54.3   MEASUREMENT I 52 52.8 52 52.8   MEASUREMENT J 64.5 67.8 64.5 67.8   MEASUREMENT K 70.3 74.3 70.3 74.3   MEASUREMENT L 75.1 77.8 75.1 77.8   MEASUREMENT M 78.8 82.3 78.8 82.3    TOTAL VOLUME  [id number] 5/25/2021:  Pt arrived w/ R toe wraps on (donned properly)  Mild swelling noted R foot and B lower legs      5/19/2021  Sensation:  Hypersensitivity R 1st toe     Measurements: (decrease circumference measurements noted compared to initial evaluation m from 3rd cuticle): R 25.1, L 23.6  9 cm from 3rd cuticle: R 25.2, L 23.6   12 cm from 3rd cuticle:R 25.8, L 25.1     Stemmer's Sign: + bilaterally      Leg Volume Measurements:  Lymphedema Calculations 5/11/2021 5/11/2021   DATE MEASURED 5/11/2021 5/11/202 bilaterally  - stockinette RLE, comprilan right foot and ankle. Pt did not bring additional comprilan for leg wrapping. Pt stating she will wrap the rest of her leg and her L foot/leg when she gets home.  Applied tensogrip: left foot/ankle size D, R leg siz decrease frequency of visits  2) Decrease R toe and foot swelling to be within 0.2 cm for the L to allow less difficulty wearing shoes- Mostly MET  3) Decrease BLE volume by a combined total of 5% to decrease risk of infections - MET   4) Patient to be ind

## 2021-06-04 NOTE — TELEPHONE ENCOUNTER
Pt awaiting HMO referral for home compression pump to be able to self manage her swelling. Please put in DME HMO referral:    CODES:  [employer]   Full Leg garments for pump:     VENDOR:  [employer]      THANKS!

## 2021-06-07 ENCOUNTER — APPOINTMENT (OUTPATIENT)
Dept: PHYSICAL THERAPY | Facility: HOSPITAL | Age: 56
End: 2021-06-07
Attending: PODIATRIST
Payer: COMMERCIAL

## 2021-06-07 ENCOUNTER — TELEPHONE (OUTPATIENT)
Dept: PHYSICAL THERAPY | Age: 56
End: 2021-06-07

## 2021-06-09 ENCOUNTER — TELEPHONE (OUTPATIENT)
Dept: INTERNAL MEDICINE CLINIC | Facility: CLINIC | Age: 56
End: 2021-06-09

## 2021-06-09 ENCOUNTER — OFFICE VISIT (OUTPATIENT)
Dept: PHYSICAL THERAPY | Facility: HOSPITAL | Age: 56
End: 2021-06-09
Attending: PODIATRIST
Payer: COMMERCIAL

## 2021-06-09 DIAGNOSIS — M76.61 TENDONITIS, ACHILLES, RIGHT: ICD-10-CM

## 2021-06-09 DIAGNOSIS — M76.821 POSTERIOR TIBIAL TENDINITIS OF RIGHT LEG: ICD-10-CM

## 2021-06-09 DIAGNOSIS — M76.822 POSTERIOR TIBIAL TENDINITIS OF LEFT LEG: ICD-10-CM

## 2021-06-09 DIAGNOSIS — M20.41 ACQUIRED HAMMER TOE OF RIGHT FOOT: ICD-10-CM

## 2021-06-09 PROCEDURE — 97140 MANUAL THERAPY 1/> REGIONS: CPT | Performed by: PHYSICAL THERAPIST

## 2021-06-09 NOTE — PROGRESS NOTES
Referring Physician: Dr. Kyle Teixeira  Diagnosis: <76.61 R achilles tendonitis, M20.41 R hammer toes, M76.821 R post tibial tendonitis, M20.21 R Hallux rigidus     Date of Onset: 2018 Evaluation Date: 5/11/2021             Physical Therapy Lymphedema Daily No from heel, mid malleolus   MEASUREMENT A 29.5 29.1 29.5 29.1   MEASUREMENT B 27.8 27.8 27.8 27.8   MEASUREMENT C 30 31.6 30 31.6   MEASUREMENT D 35.8 36.8 35.8 36.8   MEASUREMENT E 42.6 42.5 42.6 42.5   MEASUREMENT F 46.7 47.7 46.7 47.7   MEASUREMENT G 49. L 5/5                            Ankle PF 4+/5 R, 5/5 L                            Ankle IV 5/5 B                            Ankle EV: R 4+/5, L 5/5                             Knees: 5/5 B                            Hips: R grossly 4/5, L grossly 4+/5 mobility right 1st toe, adhered  - significant varicose veins BLE  - pt does report she occasionally experiences UE swelling      Measurements:  Base of 1st toe: R 9.3 cm, L 8  Base of 2nd toe: R 6, L 5.5  Base of 3rd toe: R 5.1, L 4.9  Met heads (7 cm fro circaid juxtalite calf wrap  - discussed w/ pt she can borrow velcro wrap to trial until next session         US R 1st toe, 1.0 W/cm2, 1 Mhz x 10 min                 Assessment: Focused on swelling reduction for B LE/feet. Pt awaiting custom garments.  Percy

## 2021-06-10 ENCOUNTER — TELEPHONE (OUTPATIENT)
Dept: INTERNAL MEDICINE CLINIC | Facility: CLINIC | Age: 56
End: 2021-06-10

## 2021-06-10 RX ORDER — VALACYCLOVIR HYDROCHLORIDE 1 G/1
TABLET, FILM COATED ORAL
Qty: 240 TABLET | Refills: 0 | Status: SHIPPED | OUTPATIENT
Start: 2021-06-10

## 2021-06-10 NOTE — TELEPHONE ENCOUNTER
Carla Lee from [facility] called regarding a fax that was sent to the office on 6/2. Letter sent needing a compression device for this patient.     Please call Carla Lee if the fax needs to be resent and the status of this request.

## 2021-06-11 NOTE — TELEPHONE ENCOUNTER
Asking for letter of medical necessity. Explained that the referral authorization actually came from insurance. They received background information before approving this equipment. Please clarify what else you need as this should suffice.   Vilma henriquez

## 2021-06-14 NOTE — TELEPHONE ENCOUNTER
Two working days later -- no call back from vendor. Will close this encounter on assumption that they no longer need additional information.

## 2021-06-18 NOTE — TELEPHONE ENCOUNTER
Jorge Bernal from [facility] called, explaining that the letter of necessity for the compression devise that was faxed to them, needs to be refaxed on company letter.

## 2021-06-22 ENCOUNTER — OFFICE VISIT (OUTPATIENT)
Dept: PHYSICAL THERAPY | Facility: HOSPITAL | Age: 56
End: 2021-06-22
Attending: PODIATRIST
Payer: COMMERCIAL

## 2021-06-22 DIAGNOSIS — M20.41 ACQUIRED HAMMER TOE OF RIGHT FOOT: ICD-10-CM

## 2021-06-22 DIAGNOSIS — M76.822 POSTERIOR TIBIAL TENDINITIS OF LEFT LEG: ICD-10-CM

## 2021-06-22 DIAGNOSIS — M76.821 POSTERIOR TIBIAL TENDINITIS OF RIGHT LEG: ICD-10-CM

## 2021-06-22 DIAGNOSIS — M76.61 TENDONITIS, ACHILLES, RIGHT: ICD-10-CM

## 2021-06-22 PROCEDURE — 97140 MANUAL THERAPY 1/> REGIONS: CPT | Performed by: PHYSICAL THERAPIST

## 2021-06-22 NOTE — TELEPHONE ENCOUNTER
Lisa Public called again wanting an update on this. It has to be on company letterhead per Lisa Chen. Need ASAP.

## 2021-06-22 NOTE — PROGRESS NOTES
Referring Physician: Dr. Shayne Kuhn  Diagnosis: <76.61 R achilles tendonitis, M20.41 R hammer toes, M76.821 R post tibial tendonitis, M20.21 R Hallux rigidus     Date of Onset: 2018 Evaluation Date: 5/11/2021             Physical Therapy Lymphedema Daily No malleolus 6 cm from heel, mid malleolus 6 cm from heel, mid malleolus   MEASUREMENT A 29.5 29.1 29.5 29.1   MEASUREMENT B 27.8 27.8 27.8 27.8   MEASUREMENT C 30 31.6 30 31.6   MEASUREMENT D 35.8 36.8 35.8 36.8   MEASUREMENT E 42.6 42.5 42.6 42.5   MEASUREM Ankle DF: R 5/5, L 5/5                            Ankle PF 4+/5 R, 5/5 L                            Ankle IV 5/5 B                            Ankle EV: R 4+/5, L 5/5                             Knees: 5/5 B foot toes (slight pitting  - decreased scar mobility right 1st toe, adhered  - significant varicose veins BLE  - pt does report she occasionally experiences UE swelling      Measurements:  Base of 1st toe: R 9.3 cm, L 8  Base of 2nd toe: R 6, L 5.5  Base o artiflex foot/ankle, foam L ankle, 1 comprilan for foot/ankle, sample circaid juxtalite calf wrap  - discussed w/ pt she can borrow velcro wrap to trial until next session   Manual Therapy (70 min)      MLD: Neck sequence,, abd sequence, B inguinal, BLE. L visits over a 90 day period, decreasing frequency as symptoms improve.      Treatment will include: Complete Decongestive Therapy: Manual Therapy, Self-Care/Home Management Training, Therapeutic Exercise, Neuromuscular Re-education, Orthotic Management and

## 2021-06-22 NOTE — TELEPHONE ENCOUNTER
Hermann Lowry can you help with whats needed for this patient ?  Can go to  Communication> new communication then write a headed letter   Please let me know If I can do anything   You have been in contact with the company and PT so you know where we are at with th

## 2021-06-23 NOTE — TELEPHONE ENCOUNTER
Letter generated by copying/editing PT's summary and letter initially sent to vendor. Unrelated Q&A removed from initial letter to shorten document. Highlighted important points and finally added PCP name and signature. Faxed to vendor.   [phone]

## 2021-06-24 ENCOUNTER — OFFICE VISIT (OUTPATIENT)
Dept: PHYSICAL THERAPY | Facility: HOSPITAL | Age: 56
End: 2021-06-24
Attending: PODIATRIST
Payer: COMMERCIAL

## 2021-06-24 DIAGNOSIS — M76.822 POSTERIOR TIBIAL TENDINITIS OF LEFT LEG: ICD-10-CM

## 2021-06-24 DIAGNOSIS — M76.821 POSTERIOR TIBIAL TENDINITIS OF RIGHT LEG: ICD-10-CM

## 2021-06-24 DIAGNOSIS — M20.41 ACQUIRED HAMMER TOE OF RIGHT FOOT: ICD-10-CM

## 2021-06-24 DIAGNOSIS — M76.61 TENDONITIS, ACHILLES, RIGHT: ICD-10-CM

## 2021-06-24 PROCEDURE — 97140 MANUAL THERAPY 1/> REGIONS: CPT | Performed by: PHYSICAL THERAPIST

## 2021-06-24 NOTE — PROGRESS NOTES
Referring Physician: Dr. Caryn Chase  Diagnosis: <76.61 R achilles tendonitis, M20.41 R hammer toes, M76.821 R post tibial tendonitis, M20.21 R Hallux rigidus     Date of Onset: 2018 Evaluation Date: 5/11/2021             Physical Therapy Lymphedema Daily No 5/11/2021   LOCATION/MEASUREMENTS LLE RLE LLE RLE   PATIENT POSITION  supine supine supine supine   LIMB POSITION extended extended extended extended   STARTING POINT 6 cm from heel, mid malleolus 6 cm from heel, mid malleolus 6 cm from heel, mid malleolus DIP flex/ext WFL                             2-5th toes AROM WFL except R 2nd toe slightly limited due to hammer toe                            Knees WFL                            Hips WFL                LE strength:                              Ankle DF: planus, morbid obesity     Palpation: sensitivity R 1st toe     Gait: WFL      Bed mobility/transfers: independnet     Edema/Tissue Observations:   - mild swelling B lower legs  - mild swelling L foot/toes  - mod swelling R foot toes (slight pitting  - dec discussed velcro wraps as options for calf compressoin vs bandaging.  Discussed there are foot velcro wraps but not as good as the traditional bandaging.   - R: toe wrap, tracey, hennalex foot and ankle, 2 comprilan foot and ankle  - L: [place] web[employer] Patient to be independent donning compression bandages and self MLD to assist with swelling reduction and to be able to decrease frequency of visits- MET  2) Decrease R toe and foot swelling to be within 0.2 cm for the L to allow less difficulty wearing sh

## 2021-06-29 ENCOUNTER — TELEPHONE (OUTPATIENT)
Dept: PHYSICAL THERAPY | Facility: HOSPITAL | Age: 56
End: 2021-06-29

## 2021-06-29 ENCOUNTER — APPOINTMENT (OUTPATIENT)
Dept: PHYSICAL THERAPY | Facility: HOSPITAL | Age: 56
End: 2021-06-29
Attending: PODIATRIST
Payer: COMMERCIAL

## 2021-06-29 NOTE — TELEPHONE ENCOUNTER
LVM:  UPDATE:  Additional stockings added to original referral request. Only 4 additional units granted at this time which equates to one pair; trying to get the total number up to 14 units (1 pair more).   Unable to fax info at this time, but additional un

## 2021-06-30 NOTE — TELEPHONE ENCOUNTER
Meg Calvert from [facility] call (left VM on nurse line) He says that they don't do compression stockings only devices (pump and garments) but she did have an order that was shipped to her home on 6/15.      He also states that he needs a let

## 2021-07-01 ENCOUNTER — OFFICE VISIT (OUTPATIENT)
Dept: PHYSICAL THERAPY | Facility: HOSPITAL | Age: 56
End: 2021-07-01
Attending: PODIATRIST
Payer: COMMERCIAL

## 2021-07-01 PROCEDURE — 97140 MANUAL THERAPY 1/> REGIONS: CPT | Performed by: PHYSICAL THERAPIST

## 2021-07-01 NOTE — PROGRESS NOTES
Referring Physician: Dr. Deonte Valera  Diagnosis: <76.61 R achilles tendonitis, M20.41 R hammer toes, M76.821 R post tibial tendonitis, M20.21 R Hallux rigidus     Date of Onset: 2018 Evaluation Date: 5/11/2021             Physical Therapy Lymphedema Daily No heads (7 cm from 3rd cuticle): R 23.8cm, L 23.6  9 cm from 3rd cuticle: R 24 cm, L 23.6  12 cm from 3rd cuticle:R 25.3 cm, L 25,1          Lymphedema Calculations 6/4/2021 6/4/2021 5/11/2021 5/11/2021   DATE MEASURED 6/4/2021 5/11/2021 5/11/2021 5/11/2021 L 13                            Dash PF: R 55, L 60                             Ankle IV/EV WFL                              1st Toe MTP Flex: R: 0, L 25                            1st Toe MTP ext: R 55, L 55                            1st Toe DIP flex/e Ankle IV 5/5 B                            Ankle EV: R 4+/5, L 5/5                             Knees: 5/5 B                            Hips: R grossly 4/5, L grossly 4+/5                                     Posture: pes planus, morbid ob sequence, deep breathing, R inguinal, R LE's. Compression:  R: toe wrap w/ elastomull and 2 layer comprilan for foot and ankle  L: 2 layer comprilan for foot and ankle Manual Therapy (70 min)    MLD: neck sequence, abd sequence, B inguinal, BLE.     Band ashli's)      Goals:   Goals (discussed and planned with patient involvement):     To be met in 16 visits:  1) Patient to be independent donning compression bandages and self MLD to assist with swelling reduction and to be able to decrease frequency of visi

## 2021-07-02 ENCOUNTER — TELEPHONE (OUTPATIENT)
Dept: INTERNAL MEDICINE CLINIC | Facility: CLINIC | Age: 56
End: 2021-07-02

## 2021-07-06 ENCOUNTER — OFFICE VISIT (OUTPATIENT)
Dept: PHYSICAL THERAPY | Facility: HOSPITAL | Age: 56
End: 2021-07-06
Attending: PODIATRIST
Payer: COMMERCIAL

## 2021-07-06 DIAGNOSIS — M76.822 POSTERIOR TIBIAL TENDINITIS OF LEFT LEG: ICD-10-CM

## 2021-07-06 DIAGNOSIS — M76.821 POSTERIOR TIBIAL TENDINITIS OF RIGHT LEG: ICD-10-CM

## 2021-07-06 DIAGNOSIS — M20.41 ACQUIRED HAMMER TOE OF RIGHT FOOT: ICD-10-CM

## 2021-07-06 DIAGNOSIS — M76.61 TENDONITIS, ACHILLES, RIGHT: ICD-10-CM

## 2021-07-06 PROCEDURE — 97140 MANUAL THERAPY 1/> REGIONS: CPT | Performed by: PHYSICAL THERAPIST

## 2021-07-06 NOTE — PROGRESS NOTES
Nephrology  Patient: Jorge Pal Date:2019   : 1950 Attending: Esdras Arnold MD   68 year old male        Chief complaint: ESRD, Acute cholecystitis    Admission HPI:  This is a 68 year old male with a past medical history significant for HTN, ESRD on HD, PVD,DM, CHF and CAD.  Patient presented to the ED with complaints of back pain . US completed and revealed gallstones with mildly thickened/edematous gallbladder wall. Patient was recently seen in the ED() and had CT completed. CT revealed cholelithiasis without cholecystitis. He reports he was told he had a pinched nerve and sent home. Lab work reveals WBC 13.7, lipase WNL and LFTs slightly elevated. For these above findings we have been consulted for surgical evaluation and management. At time of consultation patient reports back pain which he has had since Friday. Also reports decreased appetite. Denies fevers, chills, nausea or vomiting. Reports he recently quit smoking, denies illicit drug or alcohol use. Patient and significant other are poor historians, most history obtained from chart.  .     Nephrology Consult - Nephrology Consult due to management of Hemodialysis treatments due to ESRD. ESRD due to DM and HTN. Dialyszes at [facility]. Last HD PTA was 19. Generally dialyzes TTS schedule with EDW at 81 kg. HD orders 3.5 hours with K 2.0 bath using AVF.   This afternoon, Mr. Pal reports continued RUQ/back pain, but states it's a little better. Last HD was 19 and states it went well, except he felt tired afterwards.       Subjective/Assessment/Recent events:  19: Patient up in chair. Still c/o of abdominal pain. Denies sob, no cp. Tolerated HD session well yesterday.   Resting in bed, still w/ some abd pain. Drain in place. HD today       Past Medical History:   Diagnosis Date   • Abnormal stress test 2018   • Acute bronchitis 10/11/2014   • Anemia    • Arthritis     back, neck   • Benign essential  Referring Physician: Dr. Mason Alfred  Diagnosis: <76.61 R achilles tendonitis, M20.41 R hammer toes, M76.821 R post tibial tendonitis, M20.21 R Hallux rigidus     Date of Onset: 2018 Evaluation Date: 5/11/2021             Physical Therapy Lymphedema Daily No HTN    • Bronchitis 06/17/2015   • Cellulitis of toe of right foot 10/07/2015   • Cervicalgia 03/21/2016   • Cervicodorsal spondylosis with myelopathy    • Chest pain 05/24/2015   • Chest pain 01/12/2018   • Chronic kidney disease     CKD Stage 4   • Chronic neck pain 03/09/2014   • Chronic pain     back, neck   • Chronic systolic heart failure (CMS/[facility]) 06/20/2018   • Claudication (CMS/[facility]) 06/06/2016   • Coronary artery disease    • Diabetes mellitus (CMS/[facility]) 03/01/2013    type 2, with opthalmic manifestations   • Diabetic nephropathy (CMS/[facility])    • Dialysis patient (CMS/[facility]) 11/2018    Dialysis on T[facility]Th-Sat, [facility] # [phone]   • Edentulous    • ESRD (end stage renal disease) on HD 06/21/2018   • Essential (primary) hypertension    • Fatigue     with ADL's   • Fracture     foot   • Hyperlipidemia 07/17/2014   • Hypertensive urgency 03/18/2015   • Kidney stone 09/13/2015   • Left flank pain 06/14/2014   • Lower extremity edema    • Lumbar stenosis with neurogenic claudication    • LV dysfunction 06/20/2018   • PVD (peripheral vascular disease) with claudication (CMS/[facility]) 01/29/2016   • Shingles approx. 2015   • Sleeping difficulty     due to neck pain   • Spondylosis with myelopathy, thoracic region 05/20/2016   • Transient cerebral ischemia 02/26/2016   • Use of cane as ambulatory aid     occasional use   • Vitamin D deficiency    • Vitreous hemorrhage, left eye (CMS/[facility]) 5-8-17   • Wears reading eyeglasses        Past Surgical History:   Procedure Laterality Date   • Av fistula placement w/ ptfe Left 07/27/2018    Left upper arm av graft   • Cdl cath poss ptca poss stent  12/12/2018   • Colonoscopy diagnostic  1/8/15    Affi 5yr recall, 2 polyps benign and tubular adenoma   • Hand surgery Right    • Knee surgery Left 2000   • Lower extremity angiograms/possible pta/possible stent  6/6/2016   • Posterior cervical laminectomy  2006    with [facility]   • Shoulder surgery  2000   •  toes      6/4/2021:   Measurements: (decrease circumference measurements noted compared to initial evaluation measurements)  Base of 1st toe: R 8.3 cm, L 8  Base of 2nd toe: R 5.7 cm, L 5.5  Base of 3rd toe: R 4.7 cm, L 4.9  Met heads (7 cm from 3rd cuticl L lower leg  Decreased scar mobility R 1st toe  Marked sensitivity R 1st toe       5/28/2021  Mild swelling BLE (lower legs, feet, and toes)    AROM/Strength:                 LE AROM:                             Ankle DF: R 12, L 13 Toe amputation Right 2016    2nd toe    • Vitrectomy Left 05/08/2017    Left 23 g pars plana vitrectomy, endolaer, avastin inj., air; Dr. Healy       Social History     Socioeconomic History   • Marital status: Single     Spouse name: Not on file   • Number of children: Not on file   • Years of education: Not on file   • Highest education level: Not on file   Occupational History   • Not on file   Social Needs   • Financial resource strain: Not on file   • Food insecurity:     Worry: Not on file     Inability: Not on file   • Transportation needs:     Medical: Not on file     Non-medical: Not on file   Tobacco Use   • Smoking status: Former Smoker     Packs/day: 0.10     Years: 8.00     Pack years: 0.80     Types: Cigarettes     Start date: 7/18/1968   • Smokeless tobacco: Former User     Quit date: 1/25/2016   • Tobacco comment: 1-2 cig per day stopped in mid 20's and started up again around 2013 per daughters report   Substance and Sexual Activity   • Alcohol use: No     Alcohol/week: 0.0 oz     Frequency: Never   • Drug use: No   • Sexual activity: Yes     Partners: Female   Lifestyle   • Physical activity:     Days per week: Not on file     Minutes per session: Not on file   • Stress: Not on file   Relationships   • Social connections:     Talks on phone: Not on file     Gets together: Not on file     Attends [employer] service: Not on file     Active member of club or organization: Not on file     Attends meetings of clubs or organizations: Not on file     Relationship status: Not on file   • Intimate partner violence:     Fear of current or ex partner: Not on file     Emotionally abused: Not on file     Physically abused: Not on file     Forced sexual activity: Not on file   Other Topics Concern   • Not on file   Social History Narrative   • Not on file       Family History   Problem Relation Age of Onset   • Heart disease Mother    • Diabetes Father    • Kidney disease Brother    • Heart disease Brother          limited due to hammer toe                            Knees WFL                            Hips WFL                LE strength:                              Ankle DF: R 5/5, L 5/5                            Ankle PF 4+/5 B                            Ankle IV tachycardia   • Migraine Daughter        ALLERGIES:  No Known Allergies      Review of Systems: Positives stated above in HPI.  Full ROS completed and is otherwise negative.           Vital Last Value 24 Hour Range   Temperature 98.6 °F (37 °C) Temp  Min: 97.8 °F (36.6 °C)  Max: 99.6 °F (37.6 °C)   Pulse 79 Pulse  Min: 76  Max: 87   Respiratory 18 Resp  Min: 16  Max: 18   Blood Pressure 148/74 BP  Min: 119/56  Max: 172/74   Art BP   No data recorded   Pulse Oximetry 92 % SpO2  Min: 89 %  Max: 100 %     Vital Today Admitted   Weight 78.4 kg Weight: 81.2 kg   Height N/A Height: 5' 11\" (180.3 cm)   BMI N/A BMI (Calculated): 23.77     Weight over the past 48 Hours:  Patient Vitals for the past 48 hrs:   Weight   06/20/19 1624 77.3 kg   06/20/19 1845 80 kg   06/20/19 2230 79 kg   06/22/19 0852 78.4 kg        Hemodynamics:      Last Value 24 Hour Range   CVP   No data recorded   PAS/PAD   No data recorded   PCWP   No data recorded   CO   No data recorded   CI   No data recorded   SVR   No data recorded   SV02   No data recorded     Intake/Output:    Last Stool Occurrence:      I/O this shift:  In: -   Out: 10 [Drains:10]    I/O last 3 completed shifts:  In: 200 [I.V.:200]  Out: 25 [Blood:25]      Intake/Output Summary (Last 24 hours) at 6/22/2019 1042  Last data filed at 6/22/2019 0727  Gross per 24 hour   Intake 200 ml   Output 35 ml   Net 165 ml       Medications/Infusions:  Facility-Administered Medications Prior to Admission   Medication Dose Route Frequency Provider Last Rate Last Dose   • sodium chloride 0.9% infusion   Intravenous Continuous Jeremy Fam MD   Stopped at 05/24/15 1717     Medications Prior to Admission   Medication Sig Dispense Refill   • acetaminophen (TYLENOL) 500 MG tablet Take 1,000 mg by mouth every 6 hours as needed for Pain.     • Iron Sucrose (VENOFER IV) Inject 50 mg into the vein 1 day a week.     • Methoxy PEG-Epoetin Beta (MIRCERA IJ) Inject 30 mcg as directed every 14 days.     •  7/6/2021   Visit: 10/16  O Visit 11/16  O Visit 11/16  O Visit 11/16  O   Manual Therapy (70 min)      MLD: Neck sequence,, abd sequence, B inguinal, BLE.  Lotion used during MLD of LE    Home pump: pt has received home pump, discussed use        Co cyclobenzaprine (FLEXERIL) 10 MG tablet Take 1 tablet by mouth 3 times daily as needed for Muscle spasms. 15 tablet 0   • lidocaine (LIDODERM) 5 % patch Place 1 patch onto the skin every 24 hours. Remove patch 12 hours after applying 30 patch 0   • insulin glargine (LANTUS SOLOSTAR) 100 UNIT/ML pen-injector Administer 20 units under the skin daily 15 mL 3   • losartan (COZAAR) 100 MG tablet TAKE 1 TABLET BY MOUTH DAILY 90 tablet 1   • cilostazol (PLETAL) 100 MG tablet TAKE 1 TABLET BY MOUTH TWICE DAILY 180 tablet 2   • metoPROLOL succinate (TOPROL-XL) 50 MG 24 hr tablet TAKE 1 AND ONE-HALF TABLET BY MOUTH DAILY 135 tablet 2   • Carboxymethylcellulose Sodium (EYE DROPS OP) Apply 1 drop to eye daily as needed (dry eyes).      • Multiple Vitamins-Minerals (MULTIVITAMIN ADULT PO)      • hydrALAZINE (APRESOLINE) 100 MG tablet Take 1 tablet by mouth 3 times daily. 90 tablet 3   • NIFEdipine CC (ADALAT CC) 90 MG 24 hr tablet Take 1 tablet by mouth daily. 90 tablet 3   • atorvastatin (LIPITOR) 40 MG tablet TAKE 1 TABLET BY MOUTH DAILY 90 tablet 1   • aspirin 81 MG tablet Take 1 tablet by mouth daily. 90 tablet 3   • Insulin Pen Needle (B-D U/F PEN NEEDLE 5/16\") 31G X 8 MM Misc 2 times daily. Use to inject insulin 2 times daily. Remove needle cover(s) to expose needle before injecting. 200 each 2   • ACCU-CHEK SMARTVIEW test strip 1 each 3 times daily. Test blood sugar 2 times daily as directed. Diagnosis: E11.9. Meter: Accu-chek Tsering smartview 200 strip 11   • clopidogrel (PLAVIX) 75 MG tablet TAKE 1 TABLET BY MOUTH DAILY 60 tablet 1   • sodium bicarbonate 650 MG tablet Take 1 tablet by mouth 2 times daily. 60 tablet 3   • Blood Glucose Monitoring Suppl (ACCU-CHEK TSERING SMARTVIEW) w/Device Kit 1 kit by Other route as directed. Use to test blood sugars twice daily 1 kit 0   • Lancets Misc. (ACCU-CHEK FASTCLIX LANCET) Kit 1 each by Other route 2 times daily. 1 kit 0   • cyclobenzaprine (FLEXERIL) 10 MG tablet Take 1 tablet by  mouth 3 times daily as needed for Muscle spasms. 15 tablet 0     • pantoprazole  40 mg Oral Nightly   • piperacillin-tazobactam (ZOSYN) extended interval IVPB  3.375 g Intravenous 2 times per day   • heparin (porcine)  5,000 Units Subcutaneous 3 times per day   • aspirin  81 mg Oral Daily   • atorvastatin  40 mg Oral Daily   • cilostazol  100 mg Oral BID   • hydrALAZINE  100 mg Oral TID   • lidocaine  1 patch Transdermal Daily   • losartan  100 mg Oral Daily   • metoPROLOL succinate  75 mg Oral Daily   • NIFEdipine XL  90 mg Oral Daily   • sodium bicarbonate  650 mg Oral BID   • sodium chloride (PF)  2 mL Injection 2 times per day   • insulin regular (human)   Subcutaneous 4 times per day   • B complex-vitamin C-folic acid  1 tablet Oral Daily     • dextrose 5 % / sodium chloride 0.45% with KCl 20 mEq infusion     • sodium chloride 0.9% infusion 75 mL/hr at 06/21/19 0420   • dextrose 5 % infusion         Physical Exam:    General: Alert, no acute distress  HEENT: Head atraumatic, normocephalic.  Moist oral mucus membranes.  Sclera non-icteric.   Neck: Supple, no JVD.  Trachea midline.  Chest/Lungs: Normal effort.  Symmetrical expansion.  Clear to auscultation.  No wheezes, rales or rhonchi.  CVS: Regular rate and rhythm. S1,S2 well heard. There is no S3 or S4 gallop. Has no pericardial rub heard.  Abdomen: BS well heard. Soft, +Tender to palpation, non distended.  +drain  Neuro: No focal neurological deficit.  A&O x 3.   Skin: Warm, dry, intact.  No rashes.   Extremities:  Trace LE edema.  LUE AVF + thrill.          Laboratory Results:  Recent Labs   Lab 06/22/19  0433 06/21/19  0420 06/20/19  1823 06/19/19  1835   SODIUM 139 138 137  --    POTASSIUM 3.9 3.8 4.1  --    CHLORIDE 105 104 103  --    CO2 24 28 26  --    ANIONGAP 14 10 12  --    BUN 35* 21* 53*  --    CREATININE 5.90* 4.14* 7.40*  --    GFRNA 9 14 7  --    GFRA 10 16 8  --    GLUCOSE 91 114* 89  --    CALCIUM 8.1* 8.8 8.6  --    ALBUMIN 2.3* 2.5*  --   reduction practices to be able to self manage symptoms  5) Increase strength of R ankle to 5/5 to decrease pain walking- strength met, but still has pain  6) Increase R hip strength to at least 4+/5 to improve tolerance to walking  7) Increase R 1st toe sc 3.0*   AST 87* 52*  --  65*   GPT 48 54  --  61   ALKPT 262* 329*  --  266*   BILIRUBIN 0.6 0.8  --  0.6   LIPA  --   --   --  105       Recent Labs   Lab 06/22/19  0433 06/21/19  0420 06/19/19  1835   WBC 9.5 11.2* 13.7*   HGB 8.9* 10.2* 11.1*   HCT 28.0* 32.0* 34.3*    220 214           Impression, Plan & Recommendations:      · ESRD - Due to DM and HTN. Patient on HD at [facility] per TTS treatment schedule.    · UF to EDW as tolerated  · Has LUE AVF for access.    · HTN - BPs acceptable. meds reviewed  · Anemia - Due to ESRD. No need for NICOLE at this time as Hgb at goal.    Acute Cholecystitis - Surgery consulted. S/p laproscopic cholecystectomy w/ cholangiogram 6/21      HD note  Estimated Dry Weight (kg) (cannot be 0 kg) 81   Type of Therapy Hemodialysis   Access to be utilized Fistula/graft   Special Instructions 15 gauge   Blood Flow 300 - 400 mL/min   Titrate to pressures (arterial and venous) for maximum flow rate   Dialysate Potassium 3   Dialysate Calcium 2.5   Dialysate Sodium 138   Dialysate Bicarbonate 30   Dialysate Flow Rate 600 mL/min   Dialysate Temperature 36 degrees Celsius   Hemodialysis Duration (Hours) 3.5   Hemodialysis Weight Loss (kg as tolerated) 0   Ultrafiltration Profile No   Maintain Pressures SBP   Maintain SBP greater than (mmHg) 90   Dialyzer F160   Heparin Ordered No       I was present for the ROS and Physical exam, which was performed by Dr. Santacruz. Discussed assessment and plan.     DINORA Peterson      I have personally seen and examined the patient, formulated the assessment and plan, reviewed above and agree with the above note.    Hernan Santacruz M.D.

## 2021-07-08 ENCOUNTER — APPOINTMENT (OUTPATIENT)
Dept: PHYSICAL THERAPY | Facility: HOSPITAL | Age: 56
End: 2021-07-08
Attending: PODIATRIST
Payer: COMMERCIAL

## 2021-07-19 ENCOUNTER — OFFICE VISIT (OUTPATIENT)
Dept: INTERNAL MEDICINE CLINIC | Facility: CLINIC | Age: 56
End: 2021-07-19

## 2021-07-19 VITALS
SYSTOLIC BLOOD PRESSURE: 132 MMHG | HEIGHT: 67 IN | BODY MASS INDEX: 45.99 KG/M2 | DIASTOLIC BLOOD PRESSURE: 88 MMHG | HEART RATE: 75 BPM | OXYGEN SATURATION: 95 % | WEIGHT: 293 LBS

## 2021-07-19 DIAGNOSIS — I10 ESSENTIAL HYPERTENSION: ICD-10-CM

## 2021-07-19 DIAGNOSIS — Z86.19 HISTORY OF HERPES GENITALIS: ICD-10-CM

## 2021-07-19 DIAGNOSIS — E78.5 HYPERLIPIDEMIA, UNSPECIFIED HYPERLIPIDEMIA TYPE: ICD-10-CM

## 2021-07-19 DIAGNOSIS — Z00.00 ANNUAL PHYSICAL EXAM: Primary | ICD-10-CM

## 2021-07-19 DIAGNOSIS — R79.82 CRP ELEVATED: ICD-10-CM

## 2021-07-19 DIAGNOSIS — R70.0 ESR RAISED: ICD-10-CM

## 2021-07-19 DIAGNOSIS — Z12.4 PAP SMEAR FOR CERVICAL CANCER SCREENING: ICD-10-CM

## 2021-07-19 DIAGNOSIS — Z13.6 SCREENING FOR HEART DISEASE: ICD-10-CM

## 2021-07-19 DIAGNOSIS — Z11.52 ENCOUNTER FOR SCREENING FOR COVID-19: ICD-10-CM

## 2021-07-19 LAB
ALBUMIN SERPL-MCNC: 4 G/DL (ref 3.4–5)
ALBUMIN/GLOB SERPL: 0.9 {RATIO} (ref 1–2)
ALP LIVER SERPL-CCNC: 101 U/L
ALT SERPL-CCNC: 21 U/L
ANION GAP SERPL CALC-SCNC: 6 MMOL/L (ref 0–18)
AST SERPL-CCNC: 15 U/L (ref 15–37)
BASOPHILS # BLD AUTO: 0.04 X10(3) UL (ref 0–0.2)
BASOPHILS NFR BLD AUTO: 0.9 %
BILIRUB SERPL-MCNC: 0.4 MG/DL (ref 0.1–2)
BUN BLD-MCNC: 18 MG/DL (ref 7–18)
BUN/CREAT SERPL: 21.4 (ref 10–20)
CALCIUM BLD-MCNC: 9.1 MG/DL (ref 8.5–10.1)
CHLORIDE SERPL-SCNC: 104 MMOL/L (ref 98–112)
CHOLEST SMN-MCNC: 238 MG/DL (ref ?–200)
CO2 SERPL-SCNC: 29 MMOL/L (ref 21–32)
CREAT BLD-MCNC: 0.84 MG/DL
CRP SERPL-MCNC: 0.9 MG/DL (ref ?–0.3)
DEPRECATED RDW RBC AUTO: 48.5 FL (ref 35.1–46.3)
EOSINOPHIL # BLD AUTO: 0.07 X10(3) UL (ref 0–0.7)
EOSINOPHIL NFR BLD AUTO: 1.5 %
ERYTHROCYTE [DISTWIDTH] IN BLOOD BY AUTOMATED COUNT: 13.7 % (ref 11–15)
ERYTHROCYTE [SEDIMENTATION RATE] IN BLOOD: 24 MM/HR
EST. AVERAGE GLUCOSE BLD GHB EST-MCNC: 123 MG/DL (ref 68–126)
GLOBULIN PLAS-MCNC: 4.3 G/DL (ref 2.8–4.4)
GLUCOSE BLD-MCNC: 86 MG/DL (ref 70–99)
HBA1C MFR BLD HPLC: 5.9 % (ref ?–5.7)
HCT VFR BLD AUTO: 40.9 %
HDLC SERPL-MCNC: 62 MG/DL (ref 40–59)
HGB BLD-MCNC: 13 G/DL
IMM GRANULOCYTES # BLD AUTO: 0 X10(3) UL (ref 0–1)
IMM GRANULOCYTES NFR BLD: 0 %
LDLC SERPL CALC-MCNC: 167 MG/DL (ref ?–100)
LYMPHOCYTES # BLD AUTO: 1.7 X10(3) UL (ref 1–4)
LYMPHOCYTES NFR BLD AUTO: 37 %
M PROTEIN MFR SERPL ELPH: 8.3 G/DL (ref 6.4–8.2)
MCH RBC QN AUTO: 30.4 PG (ref 26–34)
MCHC RBC AUTO-ENTMCNC: 31.8 G/DL (ref 31–37)
MCV RBC AUTO: 95.6 FL
MONOCYTES # BLD AUTO: 0.53 X10(3) UL (ref 0.1–1)
MONOCYTES NFR BLD AUTO: 11.5 %
NEUTROPHILS # BLD AUTO: 2.26 X10 (3) UL (ref 1.5–7.7)
NEUTROPHILS # BLD AUTO: 2.26 X10(3) UL (ref 1.5–7.7)
NEUTROPHILS NFR BLD AUTO: 49.1 %
NONHDLC SERPL-MCNC: 176 MG/DL (ref ?–130)
OSMOLALITY SERPL CALC.SUM OF ELEC: 289 MOSM/KG (ref 275–295)
PATIENT FASTING Y/N/NP: YES
PATIENT FASTING Y/N/NP: YES
PLATELET # BLD AUTO: 341 10(3)UL (ref 150–450)
POTASSIUM SERPL-SCNC: 3.8 MMOL/L (ref 3.5–5.1)
RBC # BLD AUTO: 4.28 X10(6)UL
SODIUM SERPL-SCNC: 139 MMOL/L (ref 136–145)
TRIGL SERPL-MCNC: 56 MG/DL (ref 30–149)
TSI SER-ACNC: 1.23 MIU/ML (ref 0.36–3.74)
VLDLC SERPL CALC-MCNC: 11 MG/DL (ref 0–30)
WBC # BLD AUTO: 4.6 X10(3) UL (ref 4–11)

## 2021-07-19 PROCEDURE — 86140 C-REACTIVE PROTEIN: CPT | Performed by: INTERNAL MEDICINE

## 2021-07-19 PROCEDURE — 3008F BODY MASS INDEX DOCD: CPT | Performed by: INTERNAL MEDICINE

## 2021-07-19 PROCEDURE — 99396 PREV VISIT EST AGE 40-64: CPT | Performed by: INTERNAL MEDICINE

## 2021-07-19 PROCEDURE — 85652 RBC SED RATE AUTOMATED: CPT | Performed by: INTERNAL MEDICINE

## 2021-07-19 PROCEDURE — 3075F SYST BP GE 130 - 139MM HG: CPT | Performed by: INTERNAL MEDICINE

## 2021-07-19 PROCEDURE — 80053 COMPREHEN METABOLIC PANEL: CPT | Performed by: INTERNAL MEDICINE

## 2021-07-19 PROCEDURE — 3079F DIAST BP 80-89 MM HG: CPT | Performed by: INTERNAL MEDICINE

## 2021-07-19 PROCEDURE — 83036 HEMOGLOBIN GLYCOSYLATED A1C: CPT | Performed by: INTERNAL MEDICINE

## 2021-07-19 PROCEDURE — 85025 COMPLETE CBC W/AUTO DIFF WBC: CPT | Performed by: INTERNAL MEDICINE

## 2021-07-19 PROCEDURE — 88175 CYTOPATH C/V AUTO FLUID REDO: CPT | Performed by: INTERNAL MEDICINE

## 2021-07-19 PROCEDURE — 80061 LIPID PANEL: CPT | Performed by: INTERNAL MEDICINE

## 2021-07-19 PROCEDURE — 84443 ASSAY THYROID STIM HORMONE: CPT | Performed by: INTERNAL MEDICINE

## 2021-07-19 RX ORDER — POTASSIUM CHLORIDE 750 MG/1
10 TABLET, FILM COATED, EXTENDED RELEASE ORAL DAILY PRN
Qty: 30 TABLET | Refills: 2 | Status: SHIPPED | OUTPATIENT
Start: 2021-07-19

## 2021-07-19 NOTE — PROGRESS NOTES
Reta Don is a 64year old female.     Chief complaint: annual physical exam       HPI:     Reta Don is a 64year old pleasant female who presents for annual physical exam     No chest pain no sob no abdominal pain  No diarrhea or constipation daily for 3 days then one tab daily until completed (Patient not taking: Reported on 5/11/2021 ) 10 tablet 0   • amLODIPine Besylate 5 MG Oral Tab Take 1 tablet (5 mg total) by mouth daily.  (Patient not taking: Reported on 5/11/2021 ) 30 tablet 0   • cyclo tightness     Family history of thyroid disorder     Gall bladder polyp     Hyperthyroidism     Encounter for colonoscopy due to history of adenomatous colonic polyps     Suspected COVID-19 virus infection     Hypoxia      REVIEW OF SYSTEMS:   A comprehens SED RATE, WESTKELSIEREN (AUTOMATED)  - C-REACTIVE PROTEIN    2.  Annual physical exam  Diet and exercise  Weight loss  Mammogram in December  Up-to-date with a colonoscopy  Pap smear done today  Fasting blood work  Advised to get Pneumovax and shingles vaccine Application topically 2 (two) times daily.   Dispense: 45 g; Refill: 0  - THINPREP PAP WITH HPV REFLEX REQUEST B  - CBC WITH DIFFERENTIAL WITH PLATELET  - COMP METABOLIC PANEL (14)  - LIPID PANEL  - HEMOGLOBIN A1C  - TSH W REFLEX TO FREE T4  - SED RATE, RIVAS g; Refill: 0  - THINPREP PAP WITH HPV REFLEX REQUEST B  - CBC WITH DIFFERENTIAL WITH PLATELET  - COMP METABOLIC PANEL (14)  - LIPID PANEL  - HEMOGLOBIN A1C  - TSH W REFLEX TO FREE T4  - SED RATE, WESTERGREN (AUTOMATED)  - C-REACTIVE PROTEIN    6.  ESR raise METABOLIC PANEL (14)  - LIPID PANEL  - HEMOGLOBIN A1C  - TSH W REFLEX TO FREE T4  - SED RATE, WESTERGREN (AUTOMATED)  - C-REACTIVE PROTEIN    8.  History of herpes genitalis  Referral to OB/GYN  - THINPREP PAP WITH HPV REFLEX REQUEST B; Future  - CT CALCIUM W REFLEX TO FREE T4  - SED RATE, WESTKELSIEREN (AUTOMATED)  - C-REACTIVE PROTEIN  Please return to the clinic if you are having persistent or worsening symptoms   Ayana Lomax MD,   Diplomate of the [employer] of Internal Medicine  Diplomate of the [employer]

## 2021-07-28 ENCOUNTER — TELEMEDICINE (OUTPATIENT)
Dept: INTERNAL MEDICINE CLINIC | Facility: CLINIC | Age: 56
End: 2021-07-28

## 2021-07-28 DIAGNOSIS — J01.90 ACUTE SINUSITIS, RECURRENCE NOT SPECIFIED, UNSPECIFIED LOCATION: ICD-10-CM

## 2021-07-28 DIAGNOSIS — J01.90 ACUTE BACTERIAL RHINOSINUSITIS: Primary | ICD-10-CM

## 2021-07-28 DIAGNOSIS — B96.89 ACUTE BACTERIAL RHINOSINUSITIS: Primary | ICD-10-CM

## 2021-07-28 PROCEDURE — 99213 OFFICE O/P EST LOW 20 MIN: CPT | Performed by: INTERNAL MEDICINE

## 2021-07-28 RX ORDER — METHYLPREDNISOLONE 4 MG/1
TABLET ORAL
Qty: 1 EACH | Refills: 0 | Status: SHIPPED | OUTPATIENT
Start: 2021-07-28

## 2021-07-28 RX ORDER — AZITHROMYCIN 250 MG/1
TABLET, FILM COATED ORAL
Qty: 6 TABLET | Refills: 0 | Status: SHIPPED | OUTPATIENT
Start: 2021-07-28 | End: 2021-08-02

## 2021-07-28 NOTE — PROGRESS NOTES
Virtual/Telephone Check-In    Sobeida Moody verbally consents to a Virtual/Telephone Check-In service on 07/28/21. Patient has been referred to the [facility] website at [email or web address] to review the yearly Consent to Treat document.   Patient Ketty Magaña • azithromycin (ZITHROMAX Z-JENA) 250 MG Oral Tab Take 2 tablets (500 mg total) by mouth daily for 1 day, THEN 1 tablet (250 mg total) daily for 4 days. 6 tablet 0   • methylPREDNISolone (MEDROL) 4 MG Oral Tablet Therapy Pack As directed.  1 each 0   • Pot Hyperlipidemia     Vitamin D deficiency     Prediabetes     Bloating     Constipation     Gallbladder polyp     Gastroesophageal reflux disease     Abnormal nuclear stress test     Chest tightness     Family history of thyroid disorder     Gall bladder p infection with Augmentin  · If no improvement after 48 hours to start Medrol Dosepak  · Advised to get tested for Covid  · If worsening or no improvement to return to the clinic    Meds & Refills for this Visit:  Requested Prescriptions     Signed Prescrip

## 2021-08-04 ENCOUNTER — PATIENT MESSAGE (OUTPATIENT)
Dept: INTERNAL MEDICINE CLINIC | Facility: CLINIC | Age: 56
End: 2021-08-04

## 2021-08-04 NOTE — TELEPHONE ENCOUNTER
Krysten from [employer] left a voicemail checking on the status of this order. Please return her call at [phone],  Ext. [phone].

## 2021-08-04 NOTE — TELEPHONE ENCOUNTER
From: Kaila Reynolds  To: Chelsea Arauz MD  Sent: 8/4/2021 10:25 AM CDT  Subject: Prescription Question    Hello Dr Lavell Christianson    I recvd a voicemail from [facility] stating they have left messages in reference to my script for my compression garments

## 2021-08-09 ENCOUNTER — TELEPHONE (OUTPATIENT)
Dept: ENDOCRINOLOGY CLINIC | Facility: CLINIC | Age: 56
End: 2021-08-09

## 2021-08-09 ENCOUNTER — PATIENT MESSAGE (OUTPATIENT)
Dept: ENDOCRINOLOGY CLINIC | Facility: CLINIC | Age: 56
End: 2021-08-09

## 2021-08-09 DIAGNOSIS — Z86.39 HISTORY OF HYPERTHYROIDISM: Primary | ICD-10-CM

## 2021-08-09 NOTE — TELEPHONE ENCOUNTER
Pt calling to see if Dr will order her lab work for her thyroid  so she can get it done.  Please advise

## 2021-08-09 NOTE — TELEPHONE ENCOUNTER
From: Elias Sheets  To: King Guru MD  Sent: 8/9/2021 9:57 AM CDT  Subject: Referral Request    Hi Dr Lito Lopez     I would like to have my thyroid retested. I am having a few reoccurring symptoms.  I will be at [address] [place] [address] 8/10 for other test

## 2021-08-09 NOTE — TELEPHONE ENCOUNTER
Labs ordered, but will need an apt to review if abnormal   That can be a VV  Please let the patient know  Thanks

## 2021-08-09 NOTE — TELEPHONE ENCOUNTER
Received fax requesting order form be completed for WAIST HIGH compression stockings. PCP signed and faxed back.     Phone number: [phone]  Farshad Allen

## 2021-08-10 ENCOUNTER — LAB ENCOUNTER (OUTPATIENT)
Dept: LAB | Facility: HOSPITAL | Age: 56
End: 2021-08-10
Attending: INTERNAL MEDICINE
Payer: COMMERCIAL

## 2021-08-10 ENCOUNTER — OFFICE VISIT (OUTPATIENT)
Dept: PHYSICAL THERAPY | Facility: HOSPITAL | Age: 56
End: 2021-08-10
Attending: PODIATRIST
Payer: COMMERCIAL

## 2021-08-10 DIAGNOSIS — I10 ESSENTIAL HYPERTENSION: ICD-10-CM

## 2021-08-10 DIAGNOSIS — Z86.39 HISTORY OF HYPERTHYROIDISM: ICD-10-CM

## 2021-08-10 DIAGNOSIS — Z13.6 SCREENING FOR HEART DISEASE: ICD-10-CM

## 2021-08-10 DIAGNOSIS — J01.90 ACUTE SINUSITIS, RECURRENCE NOT SPECIFIED, UNSPECIFIED LOCATION: ICD-10-CM

## 2021-08-10 DIAGNOSIS — E78.5 HYPERLIPIDEMIA, UNSPECIFIED HYPERLIPIDEMIA TYPE: ICD-10-CM

## 2021-08-10 DIAGNOSIS — Z12.4 PAP SMEAR FOR CERVICAL CANCER SCREENING: ICD-10-CM

## 2021-08-10 DIAGNOSIS — J01.90 ACUTE BACTERIAL RHINOSINUSITIS: ICD-10-CM

## 2021-08-10 DIAGNOSIS — B96.89 ACUTE BACTERIAL RHINOSINUSITIS: ICD-10-CM

## 2021-08-10 DIAGNOSIS — R79.82 CRP ELEVATED: ICD-10-CM

## 2021-08-10 DIAGNOSIS — Z00.00 ANNUAL PHYSICAL EXAM: ICD-10-CM

## 2021-08-10 DIAGNOSIS — R70.0 ESR RAISED: ICD-10-CM

## 2021-08-10 DIAGNOSIS — Z86.19 HISTORY OF HERPES GENITALIS: ICD-10-CM

## 2021-08-10 DIAGNOSIS — Z11.52 ENCOUNTER FOR SCREENING FOR COVID-19: ICD-10-CM

## 2021-08-10 LAB
SARS-COV-2 IGG+IGM SERPL QL IA: REACTIVE
TSI SER-ACNC: 1.8 MIU/ML (ref 0.36–3.74)

## 2021-08-10 PROCEDURE — 84443 ASSAY THYROID STIM HORMONE: CPT

## 2021-08-10 PROCEDURE — 86769 SARS-COV-2 COVID-19 ANTIBODY: CPT

## 2021-08-10 PROCEDURE — 97140 MANUAL THERAPY 1/> REGIONS: CPT | Performed by: PHYSICAL THERAPIST

## 2021-08-10 PROCEDURE — 36415 COLL VENOUS BLD VENIPUNCTURE: CPT

## 2021-08-10 NOTE — PROGRESS NOTES
Referring Physician: Dr. Haider Mcnulty  Diagnosis: <76.61 R achilles tendonitis, M20.41 R hammer toes, M76.821 R post tibial tendonitis, M20.21 R Hallux rigidus     Date of Onset: 2018 Evaluation Date: 5/11/2021      Discharge Summary    Pt has attended 15 vis 5/11/2021 5/11/2021 5/11/2021   LOCATION/MEASUREMENTS LLE RLE LLE RLE LLE RLE   PATIENT POSITION  supine supine supine supine supine supine   LIMB POSITION extended extended extended extended extended extended   STARTING POINT 6 cm from heel, mid malleolus units), 3 pairs knee highs (6 units), and 2 ashli's (2 units)  - mild swelling B LEs     7/1/2021:  Minimal swelling of LEs  Pt was fitted for new toe caps  Discussed options for compression garments- insurance approved total of 10 units, each item is 1 un J 64.5 67.8 64.5 67.8   MEASUREMENT K 70.3 74.3 70.3 74.3   MEASUREMENT L 75.1 77.8 75.1 77.8   MEASUREMENT M 78.8 82.3 78.8 82.3    TOTAL VOLUME  [id number] [id number]   DATE MEASURED 6/4/2021 6/4/2021 - -   LOCATION/MEASUREMENTS lower legs      5/19/2021  Sensation:  Hypersensitivity R 1st toe     Measurements: (decrease circumference measurements noted compared to initial evaluation measurements)  Base of 1st toe: R 9.1 cm  Base of 2nd toe: R 5.8 cm  Base of 3rd toe: R 4.9 cm  Me cuticle:R 25.8, L 25.1     Stemmer's Sign: + bilaterally      Leg Volume Measurements:  Lymphedema Calculations 5/11/2021 5/11/2021   DATE MEASURED 5/11/2021 5/11/2021   LOCATION/MEASUREMENTS LLE RLE   PATIENT POSITION  supine supine   LIMB POSITION extend Neck sequence,, abd sequence, B inguinal, BLE.  Lotion used during MLD of LE    Compression:  - pt wearing custom knee highs and toe caps, awaiting the delivery of the remake of these, will also be scheduling fitting for the ashli's   Manual Therapy (60 min

## 2021-08-12 LAB — SARS-COV-2 RNA RESP QL NAA+PROBE: NOT DETECTED

## 2021-08-25 RX ORDER — FLUTICASONE PROPIONATE AND SALMETEROL 100; 50 UG/1; UG/1
1 POWDER RESPIRATORY (INHALATION) 2 TIMES DAILY
Qty: 1 EACH | Refills: 2 | Status: CANCELLED | OUTPATIENT
Start: 2021-08-25

## 2021-08-26 ENCOUNTER — PATIENT MESSAGE (OUTPATIENT)
Dept: INTERNAL MEDICINE CLINIC | Facility: CLINIC | Age: 56
End: 2021-08-26

## 2021-08-27 RX ORDER — CYCLOBENZAPRINE HCL 10 MG
10 TABLET ORAL 3 TIMES DAILY PRN
Qty: 30 TABLET | Refills: 0 | Status: SHIPPED | OUTPATIENT
Start: 2021-08-27 | End: 2021-09-26

## 2021-08-27 RX ORDER — FLUTICASONE PROPIONATE AND SALMETEROL 100; 50 UG/1; UG/1
1 POWDER RESPIRATORY (INHALATION) 2 TIMES DAILY
Qty: 3 EACH | Refills: 3 | Status: SHIPPED | OUTPATIENT
Start: 2021-08-27 | End: 2021-09-09

## 2021-08-27 NOTE — TELEPHONE ENCOUNTER
Griselda Kwong, [place] 8/27/2021 7:38 AM CDT      ----- Message -----  From: Ileana Louise  Sent: 8/26/2021 7:03 PM CDT  To: [facility]ade [facility] Clinical Staff  Subject: Prescription Question     Hi Dr Anders Faulkner    The pharmacy has no refills for my meds.  Can you send a

## 2021-09-03 RX ORDER — ERGOCALCIFEROL 1.25 MG/1
CAPSULE ORAL
Qty: 12 CAPSULE | Refills: 1 | Status: SHIPPED | OUTPATIENT
Start: 2021-09-03

## 2021-09-09 ENCOUNTER — TELEPHONE (OUTPATIENT)
Dept: INTERNAL MEDICINE CLINIC | Facility: CLINIC | Age: 56
End: 2021-09-09

## 2021-09-09 RX ORDER — FLUTICASONE PROPIONATE AND SALMETEROL 100; 50 UG/1; UG/1
1 POWDER RESPIRATORY (INHALATION) 2 TIMES DAILY
Qty: 60 EACH | Refills: 3 | Status: SHIPPED | OUTPATIENT
Start: 2021-09-09 | End: 2022-04-01

## 2021-09-09 NOTE — TELEPHONE ENCOUNTER
Patient contacted me to inquire about a refill on her Advair. She states the pharmacy was trying to get in touch with PCP for a refill on the Advair inhaler. Let patient know that it looks like PCP sent over new script today with 3 refills.  Instructed linda

## 2021-09-14 ENCOUNTER — PATIENT MESSAGE (OUTPATIENT)
Dept: INTERNAL MEDICINE CLINIC | Facility: CLINIC | Age: 56
End: 2021-09-14

## 2021-09-14 DIAGNOSIS — M79.673 PAIN OF FOOT, UNSPECIFIED LATERALITY: Primary | ICD-10-CM

## 2021-09-14 DIAGNOSIS — M21.619 BUNION: Primary | ICD-10-CM

## 2021-09-14 NOTE — TELEPHONE ENCOUNTER
Elpidio Arita, [facility] 9/14/2021 10:34 AM CDT      ----- Message -----  From: Jessica Florian  Sent: 9/14/2021 10:25 AM CDT  To: [facility] Clinical Staff  Subject: Referral Request     I need a consult for on my bunion surgery I had back in 2018.  Can you give me a

## 2021-09-16 NOTE — TELEPHONE ENCOUNTER
Don Rojas, [facility] [place] 9/15/2021 8:28 AM CDT      ----- Message -----  From: Yenni Fung  Sent: 9/14/2021 5:41 PM CDT  To: [facility]ade [facility] Clinical Staff  Subject: Referral Request     I am sorry Dr Klever Cage, Dr Alberto Edwards at [facility] and [facility] (who I last saw)

## 2021-10-25 RX ORDER — TRIAMTERENE AND HYDROCHLOROTHIAZIDE 37.5; 25 MG/1; MG/1
1 CAPSULE ORAL DAILY
Qty: 90 CAPSULE | Refills: 1 | Status: SHIPPED | OUTPATIENT
Start: 2021-10-25

## 2021-12-01 DIAGNOSIS — B96.89 ACUTE BACTERIAL RHINOSINUSITIS: ICD-10-CM

## 2021-12-01 DIAGNOSIS — J01.90 ACUTE SINUSITIS, RECURRENCE NOT SPECIFIED, UNSPECIFIED LOCATION: ICD-10-CM

## 2021-12-01 DIAGNOSIS — J01.90 ACUTE BACTERIAL RHINOSINUSITIS: ICD-10-CM

## 2021-12-03 ENCOUNTER — HOSPITAL ENCOUNTER (OUTPATIENT)
Age: 56
Discharge: HOME | End: 2021-12-03
Payer: COMMERCIAL

## 2021-12-03 VITALS
OXYGEN SATURATION: 97 % | RESPIRATION RATE: 18 BRPM | SYSTOLIC BLOOD PRESSURE: 182 MMHG | HEART RATE: 66 BPM | DIASTOLIC BLOOD PRESSURE: 96 MMHG | TEMPERATURE: 97 F

## 2021-12-03 DIAGNOSIS — J01.90 ACUTE SINUSITIS, RECURRENCE NOT SPECIFIED, UNSPECIFIED LOCATION: ICD-10-CM

## 2021-12-03 DIAGNOSIS — Z20.822 LAB TEST NEGATIVE FOR COVID-19 VIRUS: ICD-10-CM

## 2021-12-03 DIAGNOSIS — R03.0 ELEVATED BLOOD PRESSURE READING: ICD-10-CM

## 2021-12-03 DIAGNOSIS — Z20.822 ENCOUNTER FOR LABORATORY TESTING FOR COVID-19 VIRUS: Primary | ICD-10-CM

## 2021-12-03 PROCEDURE — 87880 STREP A ASSAY W/OPTIC: CPT | Performed by: NURSE PRACTITIONER

## 2021-12-03 PROCEDURE — 99213 OFFICE O/P EST LOW 20 MIN: CPT | Performed by: NURSE PRACTITIONER

## 2021-12-03 PROCEDURE — U0002 COVID-19 LAB TEST NON-CDC: HCPCS | Performed by: NURSE PRACTITIONER

## 2021-12-03 RX ORDER — AZITHROMYCIN 250 MG/1
TABLET, FILM COATED ORAL
Qty: 6 TABLET | Refills: 0 | Status: SHIPPED | OUTPATIENT
Start: 2021-12-03 | End: 2021-12-08

## 2021-12-03 NOTE — ED INITIAL ASSESSMENT (HPI)
Pt here with c/o sinus pressure, pain and congestion with yellow blood tinged nasal drainage for the last 7 days.

## 2021-12-03 NOTE — ED PROVIDER NOTES
Patient Seen in: [facility]      History   Patient presents with:  Sinus Problem  Nasal Congestion    Stated Complaint: Sinus issues    Subjective:   Well-appearing 26-year-old female presents with complaints of sinus pressure, nasal congest All other systems reviewed and negative except as noted above.     Physical Exam     ED Triage Vitals [12/03/21 1014]   BP (!) 182/96   Pulse 66   Resp 18   Temp 97.3 °F (36.3 °C)   Temp src Temporal   SpO2 97 %   O2 Device None (Room air)       Current Labs Reviewed   POCT RAPID STREP - Normal   RAPID SARS-COV-2 BY PCR - Normal         MDM   Patient is well-appearing. No acute distress. Rapid COVID-19 PCR not detected. Rapid strep negative.   I prescribed azithromycin x5 days for sinusitis/nasal co

## 2021-12-09 ENCOUNTER — PATIENT MESSAGE (OUTPATIENT)
Dept: INTERNAL MEDICINE CLINIC | Facility: CLINIC | Age: 56
End: 2021-12-09

## 2021-12-09 DIAGNOSIS — J32.9 SINUSITIS, UNSPECIFIED CHRONICITY, UNSPECIFIED LOCATION: Primary | ICD-10-CM

## 2021-12-10 RX ORDER — AZITHROMYCIN 250 MG/1
TABLET, FILM COATED ORAL
Qty: 6 TABLET | Refills: 0 | OUTPATIENT
Start: 2021-12-10

## 2021-12-11 ENCOUNTER — OFFICE VISIT (OUTPATIENT)
Dept: OTOLARYNGOLOGY | Facility: CLINIC | Age: 56
End: 2021-12-11

## 2021-12-11 DIAGNOSIS — R42 DIZZINESS: Primary | ICD-10-CM

## 2021-12-11 PROCEDURE — 99213 OFFICE O/P EST LOW 20 MIN: CPT | Performed by: OTOLARYNGOLOGY

## 2021-12-11 RX ORDER — PREDNISONE 20 MG/1
TABLET ORAL
Qty: 7 TABLET | Refills: 0 | Status: SHIPPED | OUTPATIENT
Start: 2021-12-11

## 2021-12-11 NOTE — PROGRESS NOTES
Tish Valenzuela is a 64year old female. Patient presents with:  Sinus Problem: Pt experience sinus infection and nose bleeds. Finished a Z-pack .  Dizziness      HISTORY OF PRESENT ILLNESS  6/17/2020  Patient  presents with popping and pressure in her ears Not on file      Highest education level: Not on file    Occupational History      Not on file    Tobacco Use      Smoking status: Never Smoker      Smokeless tobacco: Never Used    Vaping Use      Vaping Use: Never used    Substance and Sexual Activity Details   Constitutional Negative fever, weight loss. ENMT Negative Headache neck discomfort   Eyes Negative Blurred vision and vision changes. Respiratory Negative Dyspnea and wheezing.    Cardio Negative Chest pain, irregular heartbeat   GI Negative A 1/2po for 2 d, Disp: 7 tablet, Rfl: 0  •  TRIAMTERENE-HYDROCHLOROTHIAZIDE 37.5-25 MG Oral Cap, TAKE 1 CAPSULE BY MOUTH DAILY, Disp: 90 capsule, Rfl: 1  •  fluticasone-salmeterol (ADVAIR DISKUS) 100-50 MCG/DOSE Inhalation Aerosol Powder, Breath Activated, I pressure?   Would like to see if a short course of steroids and then daily use of Flonase for the next month improves this if not I asked her to call and we will work this up further     Mouna Starr MD

## 2022-01-16 ENCOUNTER — IMMUNIZATION (OUTPATIENT)
Dept: LAB | Facility: HOSPITAL | Age: 57
End: 2022-01-16
Attending: EMERGENCY MEDICINE
Payer: COMMERCIAL

## 2022-01-16 DIAGNOSIS — Z23 NEED FOR VACCINATION: Primary | ICD-10-CM

## 2022-01-16 PROCEDURE — 0054A SARSCOV2 VAC 30MCG/0.3ML IM: CPT

## 2022-01-16 PROCEDURE — 0004A SARSCOV2 VAC 30MCG/0.3ML IM: CPT

## 2022-02-10 ENCOUNTER — PATIENT MESSAGE (OUTPATIENT)
Dept: INTERNAL MEDICINE CLINIC | Facility: CLINIC | Age: 57
End: 2022-02-10

## 2022-02-11 NOTE — TELEPHONE ENCOUNTER
Terrence Villanueva [place] 2/11/2022 8:59 AM CST      ----- Message -----  From: Ramses Karimi  Sent: 2/10/2022 6:19 PM CST  To: Jade Varela Clinical Staff  Subject: Pulmonary Referral     Hi Dr Jolie Davila    I'm having shortness if breath and I want to talk to my pulmonolist about it. May I have a referral to Ebonie Urrutia in Dr José Miguel Suh office? My appointment is Tues 2/15.     Thanks

## 2022-02-15 ENCOUNTER — OFFICE VISIT (OUTPATIENT)
Dept: PULMONOLOGY | Facility: CLINIC | Age: 57
End: 2022-02-15
Payer: COMMERCIAL

## 2022-02-15 ENCOUNTER — PATIENT MESSAGE (OUTPATIENT)
Dept: PULMONOLOGY | Facility: CLINIC | Age: 57
End: 2022-02-15

## 2022-02-15 VITALS
RESPIRATION RATE: 18 BRPM | OXYGEN SATURATION: 96 % | BODY MASS INDEX: 47.09 KG/M2 | HEIGHT: 66 IN | SYSTOLIC BLOOD PRESSURE: 128 MMHG | WEIGHT: 293 LBS | HEART RATE: 69 BPM | DIASTOLIC BLOOD PRESSURE: 80 MMHG

## 2022-02-15 DIAGNOSIS — J45.30 MILD PERSISTENT ASTHMA WITHOUT COMPLICATION: Primary | ICD-10-CM

## 2022-02-15 DIAGNOSIS — F51.04 PSYCHOPHYSIOLOGICAL INSOMNIA: ICD-10-CM

## 2022-02-15 DIAGNOSIS — G47.10 HYPERSOMNIA: ICD-10-CM

## 2022-02-15 DIAGNOSIS — R06.83 SNORING: ICD-10-CM

## 2022-02-15 PROCEDURE — 3074F SYST BP LT 130 MM HG: CPT | Performed by: PHYSICIAN ASSISTANT

## 2022-02-15 PROCEDURE — 3008F BODY MASS INDEX DOCD: CPT | Performed by: PHYSICIAN ASSISTANT

## 2022-02-15 PROCEDURE — 99214 OFFICE O/P EST MOD 30 MIN: CPT | Performed by: PHYSICIAN ASSISTANT

## 2022-02-15 PROCEDURE — 3079F DIAST BP 80-89 MM HG: CPT | Performed by: PHYSICIAN ASSISTANT

## 2022-02-15 RX ORDER — MONTELUKAST SODIUM 10 MG/1
10 TABLET ORAL NIGHTLY
Qty: 30 TABLET | Refills: 2 | Status: SHIPPED | OUTPATIENT
Start: 2022-02-15

## 2022-02-16 ENCOUNTER — PATIENT MESSAGE (OUTPATIENT)
Dept: PULMONOLOGY | Facility: CLINIC | Age: 57
End: 2022-02-16

## 2022-02-16 NOTE — TELEPHONE ENCOUNTER
From: Nicki Argueta  To: Georgette Freire PA-C  Sent: 2/15/2022 6:42 PM CST  Subject: Montelukast    Ede Alvarenga    After reading the side effects of this drug, I am going to pass on taking it as it has been shown to cause severe mental issues even after taking the drug. I will however restart the Breo and take 4 puffs of Flonase in my nose at night. Also, my voice has been clear all day which is very rare since taking Advir. I will give you an update in 3weeks as discussed. Hope you understand my reasons.      Thank you  Krysten

## 2022-02-17 RX ORDER — ALBUTEROL SULFATE 90 UG/1
2 AEROSOL, METERED RESPIRATORY (INHALATION) EVERY 6 HOURS PRN
Qty: 18 G | Refills: 2 | Status: SHIPPED | OUTPATIENT
Start: 2022-02-17

## 2022-02-17 NOTE — TELEPHONE ENCOUNTER
From: Yessica Fuchs  Sent: 2/16/2022 6:58 PM CST  To: Lesa Sandoval Clinical Staff  Subject: Montelukast    May I have a refill on my Ventolin? That's the rescue inhaler I use.

## 2022-02-23 ENCOUNTER — TELEPHONE (OUTPATIENT)
Dept: PULMONOLOGY | Facility: CLINIC | Age: 57
End: 2022-02-23

## 2022-02-23 NOTE — TELEPHONE ENCOUNTER
Rx request for Albuterol 108 mcg/act filled and addressed on 2/17/22. No further action required at this time.

## 2022-03-21 ENCOUNTER — TELEPHONE (OUTPATIENT)
Dept: PHYSICAL THERAPY | Facility: HOSPITAL | Age: 57
End: 2022-03-21

## 2022-03-21 NOTE — TELEPHONE ENCOUNTER
Returned call to Dylan from [employer] she sent asking about getting more compression. Explained to Dylan that she would need specific codes to be placed on a referral that needs to be generated by her PCP. Pt states that she is re-ordering additional leg garments but would like to be fitted for B gloves. Will give codes that are needed in a reply to Krysten's inbasket message to me.

## 2022-03-23 ENCOUNTER — PATIENT MESSAGE (OUTPATIENT)
Dept: INTERNAL MEDICINE CLINIC | Facility: CLINIC | Age: 57
End: 2022-03-23

## 2022-03-24 NOTE — TELEPHONE ENCOUNTER
PCP needs to sign the form. That's all they really need. Once you sign, it'll be faxed back. Neida Lagos will give it to me once signed.

## 2022-03-31 ENCOUNTER — TELEPHONE (OUTPATIENT)
Dept: INTERNAL MEDICINE CLINIC | Facility: CLINIC | Age: 57
End: 2022-03-31

## 2022-03-31 NOTE — TELEPHONE ENCOUNTER
David from [employer] calling and would like the status of the referral to be changed from open to pending in order to start processing.  Referral number is [id number]

## 2022-04-01 ENCOUNTER — PATIENT MESSAGE (OUTPATIENT)
Dept: INTERNAL MEDICINE CLINIC | Facility: CLINIC | Age: 57
End: 2022-04-01

## 2022-04-01 RX ORDER — FLUTICASONE FUROATE AND VILANTEROL TRIFENATATE 100; 25 UG/1; UG/1
1 POWDER RESPIRATORY (INHALATION) DAILY
Qty: 3 EACH | Refills: 0 | Status: SHIPPED | OUTPATIENT
Start: 2022-04-01

## 2022-04-01 RX ORDER — FLUTICASONE FUROATE AND VILANTEROL TRIFENATATE 100; 25 UG/1; UG/1
POWDER RESPIRATORY (INHALATION)
Qty: 180 EACH | Refills: 0 | OUTPATIENT
Start: 2022-04-01

## 2022-04-12 ENCOUNTER — PATIENT MESSAGE (OUTPATIENT)
Dept: INTERNAL MEDICINE CLINIC | Facility: CLINIC | Age: 57
End: 2022-04-12

## 2022-04-13 NOTE — TELEPHONE ENCOUNTER
Amador Garcia 4/12/2022 10:07 AM CDT      ----- Message -----  From: Kirk Mack  Sent: 4/12/2022 9:14 AM CDT  To: Jade Varela Clinical Staff  Subject: Blood Work     HI Dr Majo Kent     I have an appointment to see you on 4/19. Denisse had some fatigue and I want to discuss my blood pressure meds and some other things I want to discuss with you. Can I take the blood work prior to the appointment so that you can discuss the results with me?  Thank you

## 2022-04-15 ENCOUNTER — LAB ENCOUNTER (OUTPATIENT)
Dept: LAB | Facility: REFERENCE LAB | Age: 57
End: 2022-04-15
Attending: INTERNAL MEDICINE
Payer: COMMERCIAL

## 2022-04-15 DIAGNOSIS — I10 PRIMARY HYPERTENSION: ICD-10-CM

## 2022-04-15 DIAGNOSIS — R53.83 OTHER FATIGUE: ICD-10-CM

## 2022-04-15 DIAGNOSIS — R73.03 PREDIABETES: ICD-10-CM

## 2022-04-15 LAB
ALBUMIN SERPL-MCNC: 3.7 G/DL (ref 3.4–5)
ALBUMIN/GLOB SERPL: 1 {RATIO} (ref 1–2)
ALP LIVER SERPL-CCNC: 114 U/L
ALT SERPL-CCNC: 18 U/L
ANION GAP SERPL CALC-SCNC: 3 MMOL/L (ref 0–18)
AST SERPL-CCNC: 12 U/L (ref 15–37)
BASOPHILS # BLD AUTO: 0.03 X10(3) UL (ref 0–0.2)
BASOPHILS NFR BLD AUTO: 0.7 %
BILIRUB SERPL-MCNC: 0.4 MG/DL (ref 0.1–2)
BUN BLD-MCNC: 17 MG/DL (ref 7–18)
BUN/CREAT SERPL: 18.3 (ref 10–20)
CALCIUM BLD-MCNC: 9.2 MG/DL (ref 8.5–10.1)
CHLORIDE SERPL-SCNC: 105 MMOL/L (ref 98–112)
CHOLEST SERPL-MCNC: 246 MG/DL (ref ?–200)
CO2 SERPL-SCNC: 34 MMOL/L (ref 21–32)
CREAT BLD-MCNC: 0.93 MG/DL
DEPRECATED RDW RBC AUTO: 44.2 FL (ref 35.1–46.3)
EOSINOPHIL # BLD AUTO: 0.07 X10(3) UL (ref 0–0.7)
EOSINOPHIL NFR BLD AUTO: 1.6 %
ERYTHROCYTE [DISTWIDTH] IN BLOOD BY AUTOMATED COUNT: 12.7 % (ref 11–15)
EST. AVERAGE GLUCOSE BLD GHB EST-MCNC: 117 MG/DL (ref 68–126)
FASTING PATIENT LIPID ANSWER: YES
FASTING STATUS PATIENT QL REPORTED: YES
GLOBULIN PLAS-MCNC: 3.6 G/DL (ref 2.8–4.4)
GLUCOSE BLD-MCNC: 89 MG/DL (ref 70–99)
HBA1C MFR BLD: 5.7 % (ref ?–5.7)
HCT VFR BLD AUTO: 41.3 %
HDLC SERPL-MCNC: 57 MG/DL (ref 40–59)
HGB BLD-MCNC: 13.4 G/DL
IMM GRANULOCYTES # BLD AUTO: 0.01 X10(3) UL (ref 0–1)
IMM GRANULOCYTES NFR BLD: 0.2 %
IRON SATN MFR SERPL: 17 %
IRON SERPL-MCNC: 63 UG/DL
LDLC SERPL CALC-MCNC: 176 MG/DL (ref ?–100)
LYMPHOCYTES # BLD AUTO: 1.78 X10(3) UL (ref 1–4)
LYMPHOCYTES NFR BLD AUTO: 41.1 %
MCH RBC QN AUTO: 30.4 PG (ref 26–34)
MCHC RBC AUTO-ENTMCNC: 32.4 G/DL (ref 31–37)
MCV RBC AUTO: 93.7 FL
MONOCYTES # BLD AUTO: 0.41 X10(3) UL (ref 0.1–1)
MONOCYTES NFR BLD AUTO: 9.5 %
NEUTROPHILS # BLD AUTO: 2.03 X10 (3) UL (ref 1.5–7.7)
NEUTROPHILS # BLD AUTO: 2.03 X10(3) UL (ref 1.5–7.7)
NEUTROPHILS NFR BLD AUTO: 46.9 %
NONHDLC SERPL-MCNC: 189 MG/DL (ref ?–130)
OSMOLALITY SERPL CALC.SUM OF ELEC: 295 MOSM/KG (ref 275–295)
PLATELET # BLD AUTO: 349 10(3)UL (ref 150–450)
POTASSIUM SERPL-SCNC: 4.1 MMOL/L (ref 3.5–5.1)
PROT SERPL-MCNC: 7.3 G/DL (ref 6.4–8.2)
RBC # BLD AUTO: 4.41 X10(6)UL
SODIUM SERPL-SCNC: 142 MMOL/L (ref 136–145)
TIBC SERPL-MCNC: 374 UG/DL (ref 240–450)
TRANSFERRIN SERPL-MCNC: 251 MG/DL (ref 200–360)
TRIGL SERPL-MCNC: 79 MG/DL (ref 30–149)
TSI SER-ACNC: 1.61 MIU/ML (ref 0.36–3.74)
VIT B12 SERPL-MCNC: 519 PG/ML (ref 193–986)
VIT D+METAB SERPL-MCNC: 60.3 NG/ML (ref 30–100)
VLDLC SERPL CALC-MCNC: 16 MG/DL (ref 0–30)
WBC # BLD AUTO: 4.3 X10(3) UL (ref 4–11)

## 2022-04-15 PROCEDURE — 36415 COLL VENOUS BLD VENIPUNCTURE: CPT

## 2022-04-15 PROCEDURE — 83540 ASSAY OF IRON: CPT

## 2022-04-15 PROCEDURE — 82607 VITAMIN B-12: CPT

## 2022-04-15 PROCEDURE — 80053 COMPREHEN METABOLIC PANEL: CPT

## 2022-04-15 PROCEDURE — 85025 COMPLETE CBC W/AUTO DIFF WBC: CPT

## 2022-04-15 PROCEDURE — 83036 HEMOGLOBIN GLYCOSYLATED A1C: CPT

## 2022-04-15 PROCEDURE — 80061 LIPID PANEL: CPT

## 2022-04-15 PROCEDURE — 84466 ASSAY OF TRANSFERRIN: CPT

## 2022-04-15 PROCEDURE — 84443 ASSAY THYROID STIM HORMONE: CPT

## 2022-04-15 PROCEDURE — 82306 VITAMIN D 25 HYDROXY: CPT

## 2022-04-19 ENCOUNTER — OFFICE VISIT (OUTPATIENT)
Dept: INTERNAL MEDICINE CLINIC | Facility: CLINIC | Age: 57
End: 2022-04-19
Payer: COMMERCIAL

## 2022-04-19 VITALS
OXYGEN SATURATION: 99 % | BODY MASS INDEX: 47.09 KG/M2 | HEART RATE: 78 BPM | HEIGHT: 66 IN | DIASTOLIC BLOOD PRESSURE: 82 MMHG | WEIGHT: 293 LBS | SYSTOLIC BLOOD PRESSURE: 120 MMHG

## 2022-04-19 DIAGNOSIS — J45.20 MILD INTERMITTENT ASTHMA WITHOUT COMPLICATION: ICD-10-CM

## 2022-04-19 DIAGNOSIS — Z12.31 ENCOUNTER FOR SCREENING MAMMOGRAM FOR MALIGNANT NEOPLASM OF BREAST: ICD-10-CM

## 2022-04-19 DIAGNOSIS — E78.5 HYPERLIPIDEMIA, UNSPECIFIED HYPERLIPIDEMIA TYPE: ICD-10-CM

## 2022-04-19 DIAGNOSIS — K82.4 GALLBLADDER POLYP: ICD-10-CM

## 2022-04-19 DIAGNOSIS — I10 ESSENTIAL HYPERTENSION: Primary | ICD-10-CM

## 2022-04-19 DIAGNOSIS — Z13.6 SCREENING FOR HEART DISEASE: ICD-10-CM

## 2022-04-19 PROCEDURE — 99214 OFFICE O/P EST MOD 30 MIN: CPT | Performed by: INTERNAL MEDICINE

## 2022-04-19 PROCEDURE — 3008F BODY MASS INDEX DOCD: CPT | Performed by: INTERNAL MEDICINE

## 2022-04-19 PROCEDURE — 3079F DIAST BP 80-89 MM HG: CPT | Performed by: INTERNAL MEDICINE

## 2022-04-19 PROCEDURE — 3074F SYST BP LT 130 MM HG: CPT | Performed by: INTERNAL MEDICINE

## 2022-04-19 RX ORDER — LOSARTAN POTASSIUM 50 MG/1
50 TABLET ORAL DAILY
Qty: 90 TABLET | Refills: 0 | Status: SHIPPED | OUTPATIENT
Start: 2022-04-19 | End: 2022-07-18

## 2022-05-27 ENCOUNTER — ORDER TRANSCRIPTION (OUTPATIENT)
Dept: ADMINISTRATIVE | Facility: HOSPITAL | Age: 57
End: 2022-05-27

## 2022-05-27 DIAGNOSIS — J45.30 MILD PERSISTENT ASTHMA: Primary | ICD-10-CM

## 2022-05-30 ENCOUNTER — PATIENT MESSAGE (OUTPATIENT)
Dept: INTERNAL MEDICINE CLINIC | Facility: CLINIC | Age: 57
End: 2022-05-30

## 2022-05-30 DIAGNOSIS — M25.559 HIP PAIN: Primary | ICD-10-CM

## 2022-06-01 NOTE — TELEPHONE ENCOUNTER
Rosi Roach, [address] 6/1/2022 8:10 AM CDT      ----- Message -----  From: Isaac Sánchez  Sent: 5/31/2022 7:25 PM CDT  To: Jade Varela Clinical Staff  Subject: SEVERE HIP PAIN     Its the left hip. I put ice on it last night and taking 800mg of Ibuprofen every 12 hrs as well as took a hot bath in the mornings. It seems to be calming down but still hurts. I know you dont like me taking Ibuprofen but I dont have any naproxen, but I can get some if you want me to take something else. I think its inflammation or it feels like it.

## 2022-06-02 ENCOUNTER — HOSPITAL ENCOUNTER (OUTPATIENT)
Dept: CT IMAGING | Facility: HOSPITAL | Age: 57
Discharge: HOME | End: 2022-06-02
Attending: INTERNAL MEDICINE

## 2022-06-02 DIAGNOSIS — E78.5 HYPERLIPIDEMIA, UNSPECIFIED HYPERLIPIDEMIA TYPE: ICD-10-CM

## 2022-06-02 DIAGNOSIS — I10 ESSENTIAL HYPERTENSION: ICD-10-CM

## 2022-06-02 DIAGNOSIS — Z12.31 ENCOUNTER FOR SCREENING MAMMOGRAM FOR MALIGNANT NEOPLASM OF BREAST: ICD-10-CM

## 2022-06-02 DIAGNOSIS — Z13.6 SCREENING FOR HEART DISEASE: ICD-10-CM

## 2022-06-03 ENCOUNTER — APPOINTMENT (OUTPATIENT)
Dept: LAB | Age: 57
End: 2022-06-03
Attending: PHYSICIAN ASSISTANT
Payer: COMMERCIAL

## 2022-06-03 ENCOUNTER — LAB ENCOUNTER (OUTPATIENT)
Dept: LAB | Age: 57
End: 2022-06-03
Attending: INTERNAL MEDICINE
Payer: COMMERCIAL

## 2022-06-03 ENCOUNTER — HOSPITAL ENCOUNTER (OUTPATIENT)
Dept: GENERAL RADIOLOGY | Age: 57
Discharge: HOME | End: 2022-06-03
Attending: INTERNAL MEDICINE
Payer: COMMERCIAL

## 2022-06-03 DIAGNOSIS — J45.30 MILD PERSISTENT ASTHMA WITHOUT COMPLICATION: ICD-10-CM

## 2022-06-03 DIAGNOSIS — M25.559 HIP PAIN: ICD-10-CM

## 2022-06-03 LAB — SARS-COV-2 RNA RESP QL NAA+PROBE: NOT DETECTED

## 2022-06-03 PROCEDURE — 73502 X-RAY EXAM HIP UNI 2-3 VIEWS: CPT | Performed by: INTERNAL MEDICINE

## 2022-06-05 ENCOUNTER — PATIENT MESSAGE (OUTPATIENT)
Dept: INTERNAL MEDICINE CLINIC | Facility: CLINIC | Age: 57
End: 2022-06-05

## 2022-06-06 ENCOUNTER — HOSPITAL ENCOUNTER (OUTPATIENT)
Dept: RESPIRATORY THERAPY | Facility: HOSPITAL | Age: 57
End: 2022-06-06
Attending: PHYSICIAN ASSISTANT
Payer: COMMERCIAL

## 2022-06-06 RX ORDER — VALACYCLOVIR HYDROCHLORIDE 1 G/1
TABLET, FILM COATED ORAL
Qty: 240 TABLET | Refills: 0 | Status: SHIPPED | OUTPATIENT
Start: 2022-06-06

## 2022-06-06 RX ORDER — VALACYCLOVIR HYDROCHLORIDE 1 G/1
1000 TABLET, FILM COATED ORAL DAILY
Qty: 240 TABLET | Refills: 0 | OUTPATIENT
Start: 2022-06-06

## 2022-06-06 NOTE — TELEPHONE ENCOUNTER
From: Ramses Read  To: Getachew Liz MD  Sent: 6/5/2022 4:02 PM CDT  Subject: REFILL NEEDED     I need a refill on my valacyclovir please

## 2022-07-07 ENCOUNTER — ORDER TRANSCRIPTION (OUTPATIENT)
Dept: ADMINISTRATIVE | Facility: HOSPITAL | Age: 57
End: 2022-07-07

## 2022-07-07 DIAGNOSIS — Z01.818 PREOP EXAMINATION: Primary | ICD-10-CM

## 2022-07-09 ENCOUNTER — HOSPITAL ENCOUNTER (OUTPATIENT)
Dept: MRI IMAGING | Age: 57
Discharge: HOME | End: 2022-07-09
Attending: INTERNAL MEDICINE
Payer: COMMERCIAL

## 2022-07-09 ENCOUNTER — APPOINTMENT (OUTPATIENT)
Dept: MRI IMAGING | Age: 57
End: 2022-07-09
Attending: INTERNAL MEDICINE
Payer: COMMERCIAL

## 2022-07-09 DIAGNOSIS — R52 PAIN: ICD-10-CM

## 2022-07-09 PROCEDURE — 73721 MRI JNT OF LWR EXTRE W/O DYE: CPT | Performed by: INTERNAL MEDICINE

## 2022-07-11 ENCOUNTER — PATIENT MESSAGE (OUTPATIENT)
Dept: PULMONOLOGY | Facility: CLINIC | Age: 57
End: 2022-07-11

## 2022-07-11 DIAGNOSIS — J45.30 MILD PERSISTENT ASTHMA WITHOUT COMPLICATION: Primary | ICD-10-CM

## 2022-07-11 NOTE — TELEPHONE ENCOUNTER
Tamiko Parsons- patient states she was sick and went to urgent care in [place] for breathing treatment and steroid, currently on prednisone 40mg x5 days. She is requesting order for nebulizer. Please review and sign pending DME order for nebulizer if agreeable.

## 2022-07-12 RX ORDER — ALBUTEROL SULFATE 2.5 MG/3ML
2.5 SOLUTION RESPIRATORY (INHALATION) EVERY 6 HOURS PRN
Qty: 60 ML | Refills: 0 | Status: SHIPPED | OUTPATIENT
Start: 2022-07-12

## 2022-07-12 NOTE — TELEPHONE ENCOUNTER
Sandy Ovalle, please see patient respond below. Isela Pal PA-C 11 hours ago (10:04 PM)    Also the nebulizer works for a few hours. Lyle Moran PA-C 11 hours ago (10:04 PM)   Yes I take the [place] daily. I only use the Albuterol via the nebulizer machine in an emergency situation when Breo and ventolin alone does not work. I am using the nebulizer machine every 3 to 4 hrs as needed. The Urgent care gave Me 40mg daily for 5 days of Prednisone. I take 20 mgs every 8 to 12hrs due to headaches it causes.

## 2022-07-13 ENCOUNTER — PATIENT MESSAGE (OUTPATIENT)
Dept: PULMONOLOGY | Facility: CLINIC | Age: 57
End: 2022-07-13

## 2022-07-13 DIAGNOSIS — M67.959 TENDINOPATHY INVOLVING HIP: Primary | ICD-10-CM

## 2022-07-13 RX ORDER — FLUTICASONE FUROATE AND VILANTEROL 100; 25 UG/1; UG/1
1 POWDER RESPIRATORY (INHALATION) DAILY
Qty: 3 EACH | Refills: 1 | Status: SHIPPED | OUTPATIENT
Start: 2022-07-13

## 2022-07-13 NOTE — TELEPHONE ENCOUNTER
Signed DME order for nebulizer machine faxed to E with LOV notes and demographics attached. Confirmation received. Informed patient.

## 2022-07-13 NOTE — TELEPHONE ENCOUNTER
Patient is requesting refill for [facility] with additional refills so that she does not have to keep requesting. LOV: 2/15/22  NOV: not yet scheduled, PFT is scheduled 8/2/22  Last refill 4/1/22 qty 3, 0 refills      Lyle- please review and sign pending prescription if agreeable.

## 2022-07-21 RX ORDER — TRIAMTERENE AND HYDROCHLOROTHIAZIDE 37.5; 25 MG/1; MG/1
1 CAPSULE ORAL DAILY
Qty: 90 CAPSULE | Refills: 0 | Status: SHIPPED | OUTPATIENT
Start: 2022-07-21

## 2022-07-30 ENCOUNTER — HOSPITAL ENCOUNTER (OUTPATIENT)
Dept: MAMMOGRAPHY | Facility: HOSPITAL | Age: 57
Discharge: HOME | End: 2022-07-30
Attending: INTERNAL MEDICINE
Payer: COMMERCIAL

## 2022-07-30 ENCOUNTER — LAB ENCOUNTER (OUTPATIENT)
Dept: LAB | Facility: HOSPITAL | Age: 57
End: 2022-07-30
Attending: PHYSICIAN ASSISTANT
Payer: COMMERCIAL

## 2022-07-30 ENCOUNTER — HOSPITAL ENCOUNTER (OUTPATIENT)
Dept: ULTRASOUND IMAGING | Facility: HOSPITAL | Age: 57
Discharge: HOME | End: 2022-07-30
Attending: INTERNAL MEDICINE
Payer: COMMERCIAL

## 2022-07-30 DIAGNOSIS — K82.4 GALLBLADDER POLYP: ICD-10-CM

## 2022-07-30 DIAGNOSIS — E78.5 HYPERLIPIDEMIA, UNSPECIFIED HYPERLIPIDEMIA TYPE: ICD-10-CM

## 2022-07-30 DIAGNOSIS — Z13.6 SCREENING FOR HEART DISEASE: ICD-10-CM

## 2022-07-30 DIAGNOSIS — I10 ESSENTIAL HYPERTENSION: ICD-10-CM

## 2022-07-30 DIAGNOSIS — Z12.31 ENCOUNTER FOR SCREENING MAMMOGRAM FOR MALIGNANT NEOPLASM OF BREAST: ICD-10-CM

## 2022-07-30 DIAGNOSIS — Z01.818 PREOP EXAMINATION: ICD-10-CM

## 2022-07-30 PROCEDURE — 76705 ECHO EXAM OF ABDOMEN: CPT | Performed by: INTERNAL MEDICINE

## 2022-07-30 PROCEDURE — 77063 BREAST TOMOSYNTHESIS BI: CPT | Performed by: INTERNAL MEDICINE

## 2022-07-30 PROCEDURE — 77067 SCR MAMMO BI INCL CAD: CPT | Performed by: INTERNAL MEDICINE

## 2022-07-31 LAB — SARS-COV-2 RNA RESP QL NAA+PROBE: NOT DETECTED

## 2022-08-01 ENCOUNTER — TELEPHONE (OUTPATIENT)
Dept: INTERNAL MEDICINE CLINIC | Facility: CLINIC | Age: 57
End: 2022-08-01

## 2022-08-01 NOTE — TELEPHONE ENCOUNTER
Patient called back with information that the nurse requested. [facility]    [phone], Phone #    [phone], Fax #    They need the approval letter & the prescription order.

## 2022-08-01 NOTE — TELEPHONE ENCOUNTER
10/28/2019       RE: Rubina Desouza  [address]  [facility] [address]     Dear Colleague,    Thank you for referring your patient, Rubina Desouza, to the [facility] AND INST FOR PROSTATE AND UROLOGIC CANCERS at [facility]. Please see a copy of my visit note below.    PREPROCEDURE DIAGNOSES:    1. Atrophic left kidney, right pelviectasis and hydroureter, and schistosomiasis    POSTPROCEDURE DIAGNOSES:  -Normal bladder capacity (690 mL) with normal filling sensations.  -Good bladder compliance without DO/DOI.  -No BREANNE.  -Good detrusor contraction during voiding to max Pdet 28 cm H2O.  -Very brisk flow rate (Qmax 50 mL/s) with a bell-shaped flow curve and complete bladder emptying (final PVR 0 mL).  -EMG quiet during voiding.  -BOOI does not suggest bladder outlet obstruction.  -Fluoroscopy reveals a mildly-trabeculated bladder wall without diverticulae or cellules. No vesicoureteral reflux was observed. The bladder neck was closed during filling. As she was unable to void on the [employer] chair, fluoroscopy was removed, and voiding images are therefore unavailable.    PROCEDURE:    1. Uroflowmetry.  2. Sterile urethral catheterization for measurement of postvoid residual urine volume.  3. Complex filling cystometrogram with measurement of bladder and rectal pressures.  4. Complex voiding cystometrogram with measurement of bladder and rectal pressures.  5. Electromyography of the pelvic floor during urodynamics.  6. Fluoroscopic imaging of the bladder during urodynamics, at least 3 views.    7. Interpretation of urodynamics and flouroscopic imaging.      INDICATIONS FOR PROCEDURE:  Ms. Rubina Desouza is a pleasant 33 year old female with atrophic left kidney, right pelviectasis and hydroureter, and schistosomiasis. Baseline video urodynamic assessment is requested today by Dr. Carbajal to better characterize Ms. Rubina Desouza's voiding dysfunction.      VOIDING DIARY:  Not  calling to see if patient received the referral authorization letter sent in June for the compression sleeves. Patient unable to discuss t the moment. Will call office back. completed.    DESCRIPTION OF PROCEDURE:  Risks, benefits, and alternatives to urodynamics were discussed with the patient and she wished to proceed.  Urodynamics are planned to better assess the primary etiology for Ms. Desouza's urologic dysfunction. After informed consent was obtained, the patient was taken to the procedure room where uroflowmetry was performed. Findings below.     PRE-STUDY UROFLOWMETRY:  Voided volume: 629 mL.  Maximum flow rate: 33.2 mL/sec.  Average flow rate: 14.3 mL/sec.  Character of the curve: Bell-shaped.  Postvoid residual by catheter: 125 mL.  Pretest urine dipstick was negative for leukocytes and nitrites.    Next a 7F double-lumen urodynamics catheter was inserted into the bladder under sterile technique via the urethra.  A 7F abdominal manometry catheter was placed in the rectum.  EMG pads were placed on both sides of the anal verge.  The bladder was filled with 200 mL of Omnipaque at 30 mL/minute and serial pressures were recorded.  With coughing there was an appropriate rise in vesical and abdominal pressures with no change in detrusor pressure, confirming good study catheter placement.    DURING THE FILLING PHASE:  First sensation: 195 mL.  First Desire: 274 mL.  Strong Desire: 348 mL.  Maximum Capacity: 690 mL.    Uninhibited detrusor contractions: None.  Compliance: Good. PDet=0 cmH20 at capacity.  Continence: No DOI or BREANNE  EMG: Concordant during filling.    DURING THE VOIDING PHASE:  The patient made an initial attempt to void on the [place] chair, but was unable. She was then transferred down to a commode, which yielded the following data:   Maximum detrusor contraction with void: 28 cm of H2O pressure.  Voided volume: 690 mL.  Maximum flow rate: 50 mL/sec.  Average flow rate: 16 mL/sec.  Postvoid Residual: 0 mL.  EMG activity: Quiet.  Character of voiding curve: Bell-shaped.  BOOI: -78.3 (suggesting no obstruction - see key below)  [obstructed (HINSON index [BOOI] ? 40); equivocal  (no definite   obstruction; BOOI 20-40); and no obstruction (BOOI ? 20)]    FLUOROSCOPIC IMAGING OF THE BLADDER DURING URODYNAMICS:  Please note, image numbers on UDS tracings correlate with iSite series numbers on PACS images. Fluoroscopy during today's procedure demonstrated a mildly-trabeculated bladder wall without diverticulae or cellules.  No vesicoureteral reflux was observed.  The bladder neck was closed during filling. As she was unable to void on the [employer] chair, fluoroscopy was removed, and voiding images are therefore unavailable.  After voiding to completion, all catheters were removed and the patient was brought back into the consultation room to further discuss today's study results.      ASSESSMENT/PLAN:  Ms. Rubina Desouza is a pleasant 33 year old female with atrophic left kidney, right pelviectasis and hydroureter, and schistosomiasis who demonstrated the following findings today on urodynamic evaluation:    -Normal bladder capacity (690 mL) with normal filling sensations.  -Good bladder compliance without DO/DOI.  -No BREANNE.  -Good detrusor contraction during voiding to max Pdet 28 cm H2O.  -Very brisk flow rate (Qmax 50 mL/s) with a bell-shaped flow curve and complete bladder emptying (final PVR 0 mL).  -EMG quiet during voiding.  -BOOI does not suggest bladder outlet obstruction.  -Fluoroscopy reveals a mildly-trabeculated bladder wall without diverticulae or cellules. No vesicoureteral reflux was observed. The bladder neck was closed during filling. As she was unable to void on the [place] chair, fluoroscopy was removed, and voiding images are therefore unavailable.    The patient will follow up as scheduled with Dr. Rollins to further discuss today's study results and make plans for how best to proceed.      - A single Keflex was provided for UTI prophylaxis following completion of today's study per department protocol.  The risk of UTI with VUDS is low at ~2.5-3%.      Thank you for allowing me  to participate in the care of Ms. Rubina Desouza and please don't hesitate to contact me with any questions or concerns.      This procedure was performed under a collaborative agreement with Dr Viral Hampton, Professor and  of Urology, [facility].    MARCIN De, CNP  Department of Urology       Again, thank you for allowing me to participate in the care of your patient.      Sincerely,    Ruby Shah, CNP

## 2022-08-02 ENCOUNTER — HOSPITAL ENCOUNTER (OUTPATIENT)
Dept: RESPIRATORY THERAPY | Facility: HOSPITAL | Age: 57
Discharge: HOME | End: 2022-08-02
Attending: PHYSICIAN ASSISTANT
Payer: COMMERCIAL

## 2022-08-02 DIAGNOSIS — J45.30 MILD PERSISTENT ASTHMA: ICD-10-CM

## 2022-08-02 PROCEDURE — 94060 EVALUATION OF WHEEZING: CPT

## 2022-08-02 PROCEDURE — 94726 PLETHYSMOGRAPHY LUNG VOLUMES: CPT

## 2022-08-02 PROCEDURE — 94729 DIFFUSING CAPACITY: CPT

## 2022-08-12 ENCOUNTER — TELEPHONE (OUTPATIENT)
Dept: INTERNAL MEDICINE CLINIC | Facility: CLINIC | Age: 57
End: 2022-08-12

## 2022-08-22 ENCOUNTER — TELEPHONE (OUTPATIENT)
Dept: INTERNAL MEDICINE CLINIC | Facility: CLINIC | Age: 57
End: 2022-08-22

## 2022-08-22 NOTE — TELEPHONE ENCOUNTER
Left message for patient regarding billing inquiry. I do not see a balance for date of service 4/19/2022.  Original message stated the claim was processed as a specialist for date of service 4/19/2022 and there was a balance owed on the account for that date of service

## 2022-10-28 ENCOUNTER — HOSPITAL ENCOUNTER (OUTPATIENT)
Age: 57
Discharge: HOME | End: 2022-10-28
Payer: COMMERCIAL

## 2022-10-28 VITALS
OXYGEN SATURATION: 96 % | TEMPERATURE: 98 F | SYSTOLIC BLOOD PRESSURE: 152 MMHG | HEART RATE: 76 BPM | DIASTOLIC BLOOD PRESSURE: 81 MMHG | RESPIRATION RATE: 18 BRPM

## 2022-10-28 DIAGNOSIS — J01.40 ACUTE NON-RECURRENT PANSINUSITIS: Primary | ICD-10-CM

## 2022-10-28 RX ORDER — AZITHROMYCIN 250 MG/1
TABLET, FILM COATED ORAL
Qty: 6 TABLET | Refills: 0 | Status: SHIPPED | OUTPATIENT
Start: 2022-10-28 | End: 2022-11-02

## 2022-10-28 RX ORDER — ONDANSETRON 4 MG/1
4 TABLET, ORALLY DISINTEGRATING ORAL EVERY 4 HOURS PRN
Qty: 10 TABLET | Refills: 0 | Status: SHIPPED | OUTPATIENT
Start: 2022-10-28 | End: 2022-11-04

## 2022-10-28 NOTE — ED INITIAL ASSESSMENT (HPI)
Pt states having pain pressure and sinus congestion for about 2 weeks now. Pt states has been using sudafed. Pt states has hx of sinus infections. Pt denies fevers. Pt states has been nauseated. Pt denies ear pain.

## 2022-10-31 ENCOUNTER — TELEPHONE (OUTPATIENT)
Dept: INTERNAL MEDICINE CLINIC | Facility: CLINIC | Age: 57
End: 2022-10-31

## 2022-11-01 RX ORDER — BENZONATATE 200 MG/1
200 CAPSULE ORAL 3 TIMES DAILY PRN
Qty: 30 CAPSULE | Refills: 0 | Status: SHIPPED | OUTPATIENT
Start: 2022-11-01

## 2022-11-01 RX ORDER — NIRMATRELVIR AND RITONAVIR 300-100 MG
KIT ORAL
Qty: 30 TABLET | Refills: 0 | Status: SHIPPED | OUTPATIENT
Start: 2022-11-01 | End: 2022-11-06

## 2022-11-01 NOTE — TELEPHONE ENCOUNTER
Discussed with the patient advised to start paxlovid   And benzonatate   If chest pain or sob to go to the ER

## 2022-11-07 ENCOUNTER — PATIENT MESSAGE (OUTPATIENT)
Dept: INTERNAL MEDICINE CLINIC | Facility: CLINIC | Age: 57
End: 2022-11-07

## 2022-11-07 DIAGNOSIS — L60.8 CHANGE IN NAIL APPEARANCE: Primary | ICD-10-CM

## 2022-11-08 NOTE — TELEPHONE ENCOUNTER
Kerry Frye, [facility] [place] 11/7/2022 4:09 PM CST      ----- Message -----  From: Greg Rubi  Sent: 11/7/2022 4:05 PM CST  To: Jade Varela Clinical Staff  Subject: Inez Alba    May I have a referral to a podiatrist? I have something wrong with my nail on both feet. I want to rule out a fungus. I prefer [facility] if possible as it is closer. Please no DR. JOHANA GAO. He did my foot surgery and completely botched my surgery. Anyone else is fine.  Thank you

## 2022-11-08 NOTE — TELEPHONE ENCOUNTER
[facility], [facility] [place] 11/7/2022 4:17 PM CST      ----- Message -----  From: Germán Ross  Sent: 11/7/2022 4:16 PM CST  To: Jade Varela Clinical Staff  Subject: Merlene russ. May I have another referral to [facility]? I have seen her before.  Thanks

## 2022-11-15 ENCOUNTER — MED REC SCAN ONLY (OUTPATIENT)
Dept: INTERNAL MEDICINE CLINIC | Facility: CLINIC | Age: 57
End: 2022-11-15

## 2022-12-05 ENCOUNTER — TELEPHONE (OUTPATIENT)
Dept: INTERNAL MEDICINE CLINIC | Facility: CLINIC | Age: 57
End: 2022-12-05

## 2022-12-05 NOTE — TELEPHONE ENCOUNTER
Ginette from [employer] called. On 11/29 a prescription fax was sent to Dr. Ramandeep Mccollum for her signature for compression garments. The company did get the referral but they also need this prescription form signed, too. Ginette is faxing the form again.

## 2022-12-06 ENCOUNTER — OFFICE VISIT (OUTPATIENT)
Dept: INTERNAL MEDICINE CLINIC | Facility: CLINIC | Age: 57
End: 2022-12-06
Payer: COMMERCIAL

## 2022-12-06 VITALS
DIASTOLIC BLOOD PRESSURE: 86 MMHG | BODY MASS INDEX: 47.09 KG/M2 | WEIGHT: 293 LBS | HEART RATE: 88 BPM | OXYGEN SATURATION: 100 % | SYSTOLIC BLOOD PRESSURE: 120 MMHG | HEIGHT: 66 IN

## 2022-12-06 DIAGNOSIS — K82.4 GALL BLADDER POLYP: ICD-10-CM

## 2022-12-06 DIAGNOSIS — R73.03 PREDIABETES: ICD-10-CM

## 2022-12-06 DIAGNOSIS — J45.20 MILD INTERMITTENT ASTHMA WITHOUT COMPLICATION: ICD-10-CM

## 2022-12-06 DIAGNOSIS — E78.5 HYPERLIPIDEMIA, UNSPECIFIED HYPERLIPIDEMIA TYPE: ICD-10-CM

## 2022-12-06 DIAGNOSIS — Z00.00 ANNUAL PHYSICAL EXAM: Primary | ICD-10-CM

## 2022-12-06 DIAGNOSIS — I89.0 LYMPHEDEMA OF BOTH LOWER EXTREMITIES: ICD-10-CM

## 2022-12-06 DIAGNOSIS — R91.1 LUNG NODULE: ICD-10-CM

## 2022-12-06 DIAGNOSIS — J32.9 SINUSITIS, UNSPECIFIED CHRONICITY, UNSPECIFIED LOCATION: ICD-10-CM

## 2022-12-06 LAB
ALBUMIN SERPL-MCNC: 3.8 G/DL (ref 3.4–5)
ALBUMIN/GLOB SERPL: 0.9 {RATIO} (ref 1–2)
ALP LIVER SERPL-CCNC: 122 U/L
ALT SERPL-CCNC: 20 U/L
ANION GAP SERPL CALC-SCNC: 7 MMOL/L (ref 0–18)
AST SERPL-CCNC: 12 U/L (ref 15–37)
BILIRUB SERPL-MCNC: 0.5 MG/DL (ref 0.1–2)
BILIRUB UR QL: NEGATIVE
BUN BLD-MCNC: 21 MG/DL (ref 7–18)
BUN/CREAT SERPL: 20.6 (ref 10–20)
CALCIUM BLD-MCNC: 9.5 MG/DL (ref 8.5–10.1)
CHLORIDE SERPL-SCNC: 102 MMOL/L (ref 98–112)
CHOLEST SERPL-MCNC: 281 MG/DL (ref ?–200)
CLARITY UR: CLEAR
CO2 SERPL-SCNC: 28 MMOL/L (ref 21–32)
COLOR UR: YELLOW
CREAT BLD-MCNC: 1.02 MG/DL
EST. AVERAGE GLUCOSE BLD GHB EST-MCNC: 111 MG/DL (ref 68–126)
FASTING PATIENT LIPID ANSWER: YES
FASTING STATUS PATIENT QL REPORTED: YES
GFR SERPLBLD BASED ON 1.73 SQ M-ARVRAT: 64 ML/MIN/1.73M2 (ref 60–?)
GLOBULIN PLAS-MCNC: 4.3 G/DL (ref 2.8–4.4)
GLUCOSE BLD-MCNC: 95 MG/DL (ref 70–99)
GLUCOSE UR-MCNC: NEGATIVE MG/DL
HBA1C MFR BLD: 5.5 % (ref ?–5.7)
HDLC SERPL-MCNC: 64 MG/DL (ref 40–59)
HGB UR QL STRIP.AUTO: NEGATIVE
KETONES UR-MCNC: NEGATIVE MG/DL
LDLC SERPL CALC-MCNC: 200 MG/DL (ref ?–100)
LEUKOCYTE ESTERASE UR QL STRIP.AUTO: NEGATIVE
NITRITE UR QL STRIP.AUTO: NEGATIVE
NONHDLC SERPL-MCNC: 217 MG/DL (ref ?–130)
OSMOLALITY SERPL CALC.SUM OF ELEC: 287 MOSM/KG (ref 275–295)
PH UR: 6 [PH] (ref 5–8)
POTASSIUM SERPL-SCNC: 3.8 MMOL/L (ref 3.5–5.1)
PROT SERPL-MCNC: 8.1 G/DL (ref 6.4–8.2)
PROT UR-MCNC: NEGATIVE MG/DL
SODIUM SERPL-SCNC: 137 MMOL/L (ref 136–145)
SP GR UR STRIP: 1.01 (ref 1–1.03)
TRIGL SERPL-MCNC: 100 MG/DL (ref 30–149)
TSI SER-ACNC: 1.44 MIU/ML (ref 0.36–3.74)
UROBILINOGEN UR STRIP-ACNC: <2
VIT C UR-MCNC: NEGATIVE MG/DL
VLDLC SERPL CALC-MCNC: 21 MG/DL (ref 0–30)

## 2022-12-06 PROCEDURE — 3079F DIAST BP 80-89 MM HG: CPT | Performed by: INTERNAL MEDICINE

## 2022-12-06 PROCEDURE — 99396 PREV VISIT EST AGE 40-64: CPT | Performed by: INTERNAL MEDICINE

## 2022-12-06 PROCEDURE — 84443 ASSAY THYROID STIM HORMONE: CPT | Performed by: INTERNAL MEDICINE

## 2022-12-06 PROCEDURE — 81003 URINALYSIS AUTO W/O SCOPE: CPT | Performed by: INTERNAL MEDICINE

## 2022-12-06 PROCEDURE — 3008F BODY MASS INDEX DOCD: CPT | Performed by: INTERNAL MEDICINE

## 2022-12-06 PROCEDURE — 3074F SYST BP LT 130 MM HG: CPT | Performed by: INTERNAL MEDICINE

## 2022-12-06 PROCEDURE — 80053 COMPREHEN METABOLIC PANEL: CPT | Performed by: INTERNAL MEDICINE

## 2022-12-06 PROCEDURE — 80061 LIPID PANEL: CPT | Performed by: INTERNAL MEDICINE

## 2022-12-06 PROCEDURE — 83036 HEMOGLOBIN GLYCOSYLATED A1C: CPT | Performed by: INTERNAL MEDICINE

## 2022-12-06 RX ORDER — AMOXICILLIN AND CLAVULANATE POTASSIUM 875; 125 MG/1; MG/1
1 TABLET, FILM COATED ORAL 2 TIMES DAILY
Qty: 20 TABLET | Refills: 0 | Status: SHIPPED | OUTPATIENT
Start: 2022-12-06 | End: 2022-12-16

## 2022-12-06 RX ORDER — CYCLOBENZAPRINE HCL 10 MG
10 TABLET ORAL 3 TIMES DAILY PRN
Qty: 30 TABLET | Refills: 1 | Status: SHIPPED | OUTPATIENT
Start: 2022-12-06

## 2022-12-06 RX ORDER — PREDNISONE 20 MG/1
TABLET ORAL
COMMUNITY
Start: 2022-07-05

## 2022-12-06 RX ORDER — TRIAMTERENE AND HYDROCHLOROTHIAZIDE 37.5; 25 MG/1; MG/1
1 CAPSULE ORAL DAILY
Qty: 90 CAPSULE | Refills: 3 | Status: SHIPPED | OUTPATIENT
Start: 2022-12-06

## 2022-12-06 RX ORDER — FLUTICASONE PROPIONATE 50 MCG
2 SPRAY, SUSPENSION (ML) NASAL DAILY
Qty: 15.8 ML | Refills: 0 | Status: SHIPPED | OUTPATIENT
Start: 2022-12-06

## 2022-12-06 RX ORDER — ALBUTEROL SULFATE 90 UG/1
1 AEROSOL, METERED RESPIRATORY (INHALATION) EVERY 6 HOURS PRN
Qty: 18 G | Refills: 0 | Status: SHIPPED | OUTPATIENT
Start: 2022-12-06

## 2022-12-07 DIAGNOSIS — R74.8 ELEVATED ALKALINE PHOSPHATASE LEVEL: Primary | ICD-10-CM

## 2022-12-07 RX ORDER — ROSUVASTATIN CALCIUM 10 MG/1
10 TABLET, COATED ORAL NIGHTLY
Qty: 90 TABLET | Refills: 1 | Status: SHIPPED | OUTPATIENT
Start: 2022-12-07 | End: 2023-03-07

## 2022-12-08 ENCOUNTER — PATIENT MESSAGE (OUTPATIENT)
Dept: INTERNAL MEDICINE CLINIC | Facility: CLINIC | Age: 57
End: 2022-12-08

## 2022-12-09 NOTE — TELEPHONE ENCOUNTER
Pt call because her employer still hasn't received the physical form.   Please fax again to:  [phone]

## 2023-01-06 ENCOUNTER — PATIENT MESSAGE (OUTPATIENT)
Dept: INTERNAL MEDICINE CLINIC | Facility: CLINIC | Age: 58
End: 2023-01-06

## 2023-01-06 DIAGNOSIS — R09.81 SINUS CONGESTION: Primary | ICD-10-CM

## 2023-01-08 NOTE — TELEPHONE ENCOUNTER
Oliverio Springer, [facility] 1/6/2023 1:20 PM CST      ----- Message -----  From: Timo Reina  Sent: 1/6/2023 1:02 PM CST  To: Jade Varela Clinical Staff  Subject: ENT Referral     Hello may I have a referral to Jai Abreu MD? My sinus issues are not getting better.  Thank you

## 2023-01-10 RX ORDER — FLUTICASONE FUROATE AND VILANTEROL 100; 25 UG/1; UG/1
1 POWDER RESPIRATORY (INHALATION) DAILY
Qty: 1 EACH | Refills: 1 | Status: SHIPPED | OUTPATIENT
Start: 2023-01-10

## 2023-01-30 NOTE — TELEPHONE ENCOUNTER
Pt states she has been taking Rivaroxaban for two weeks and every time she takes it she feels dizzy, lightheaded and some tingling in her right side. Pt has not taken the medication this morning and would like to speak to someone before she does. Undermining Type: Entire Wound

## 2023-02-07 DIAGNOSIS — Z00.00 ANNUAL PHYSICAL EXAM: ICD-10-CM

## 2023-02-07 DIAGNOSIS — I89.0 LYMPHEDEMA OF BOTH LOWER EXTREMITIES: ICD-10-CM

## 2023-02-07 DIAGNOSIS — R91.1 LUNG NODULE: ICD-10-CM

## 2023-02-07 DIAGNOSIS — J45.20 MILD INTERMITTENT ASTHMA WITHOUT COMPLICATION: ICD-10-CM

## 2023-02-07 DIAGNOSIS — J32.9 SINUSITIS, UNSPECIFIED CHRONICITY, UNSPECIFIED LOCATION: ICD-10-CM

## 2023-02-07 DIAGNOSIS — K82.4 GALL BLADDER POLYP: ICD-10-CM

## 2023-02-07 DIAGNOSIS — E78.5 HYPERLIPIDEMIA, UNSPECIFIED HYPERLIPIDEMIA TYPE: ICD-10-CM

## 2023-02-07 DIAGNOSIS — R73.03 PREDIABETES: ICD-10-CM

## 2023-02-10 ENCOUNTER — OFFICE VISIT (OUTPATIENT)
Dept: OTOLARYNGOLOGY | Facility: CLINIC | Age: 58
End: 2023-02-10

## 2023-02-10 DIAGNOSIS — J34.3 NASAL TURBINATE HYPERTROPHY: Primary | ICD-10-CM

## 2023-02-10 DIAGNOSIS — R06.83 SNORING: ICD-10-CM

## 2023-02-10 PROCEDURE — 99213 OFFICE O/P EST LOW 20 MIN: CPT | Performed by: SPECIALIST

## 2023-02-10 RX ORDER — MONTELUKAST SODIUM 10 MG/1
10 TABLET ORAL NIGHTLY
Qty: 30 TABLET | Refills: 3 | Status: SHIPPED | OUTPATIENT
Start: 2023-02-10

## 2023-02-10 NOTE — PATIENT INSTRUCTIONS
I added Singulair to your Zyrtec and Flonase. Please follow-up in 3 to 4 months time to recheck your airway. No evidence of middle ear fluid on the day to your visit.

## 2023-02-14 ENCOUNTER — HOSPITAL ENCOUNTER (OUTPATIENT)
Dept: CT IMAGING | Facility: HOSPITAL | Age: 58
Discharge: HOME | End: 2023-02-14
Attending: INTERNAL MEDICINE
Payer: COMMERCIAL

## 2023-02-14 DIAGNOSIS — R91.1 LUNG NODULE: ICD-10-CM

## 2023-02-14 DIAGNOSIS — J32.9 SINUSITIS, UNSPECIFIED CHRONICITY, UNSPECIFIED LOCATION: ICD-10-CM

## 2023-02-14 DIAGNOSIS — R73.03 PREDIABETES: ICD-10-CM

## 2023-02-14 DIAGNOSIS — J45.20 MILD INTERMITTENT ASTHMA WITHOUT COMPLICATION: ICD-10-CM

## 2023-02-14 DIAGNOSIS — E78.5 HYPERLIPIDEMIA, UNSPECIFIED HYPERLIPIDEMIA TYPE: ICD-10-CM

## 2023-02-14 DIAGNOSIS — I89.0 LYMPHEDEMA OF BOTH LOWER EXTREMITIES: ICD-10-CM

## 2023-02-14 DIAGNOSIS — Z00.00 ANNUAL PHYSICAL EXAM: ICD-10-CM

## 2023-02-14 DIAGNOSIS — K82.4 GALL BLADDER POLYP: ICD-10-CM

## 2023-02-14 PROCEDURE — 71250 CT THORAX DX C-: CPT | Performed by: INTERNAL MEDICINE

## 2023-04-10 ENCOUNTER — TELEPHONE (OUTPATIENT)
Dept: PULMONOLOGY | Facility: CLINIC | Age: 58
End: 2023-04-10

## 2023-04-10 RX ORDER — FLUTICASONE FUROATE AND VILANTEROL 100; 25 UG/1; UG/1
1 POWDER RESPIRATORY (INHALATION) DAILY
Qty: 1 EACH | Refills: 1 | Status: CANCELLED | OUTPATIENT
Start: 2023-04-10

## 2023-04-12 ENCOUNTER — PATIENT MESSAGE (OUTPATIENT)
Dept: INTERNAL MEDICINE CLINIC | Facility: CLINIC | Age: 58
End: 2023-04-12

## 2023-04-13 RX ORDER — FLUTICASONE FUROATE AND VILANTEROL 100; 25 UG/1; UG/1
1 POWDER RESPIRATORY (INHALATION) DAILY
Qty: 1 EACH | Refills: 1 | Status: SHIPPED | OUTPATIENT
Start: 2023-04-13

## 2023-04-13 NOTE — TELEPHONE ENCOUNTER
From: Ingrid Scherer  To: Gillermina Litten, MD  Sent: 4/12/2023 4:51 PM CDT  Subject: REFILL - Merea Lemus. I hope you are well. I need a refill on my Breo. Can you call it into my [facility]? I reached out to the wrong Dr for a refill as I don't see Gurinder Ventura unless its a special issue with my breathing. I see you for all my health care.      Thank you

## 2023-05-15 RX ORDER — FLUTICASONE FUROATE AND VILANTEROL TRIFENATATE 100; 25 UG/1; UG/1
POWDER RESPIRATORY (INHALATION)
Qty: 60 EACH | Refills: 2 | Status: SHIPPED | OUTPATIENT
Start: 2023-05-15

## 2023-05-15 NOTE — TELEPHONE ENCOUNTER
Virtual visit scheduled with Jose Singh on 7/14/23 at 11am.  Confirmed date & time, patient verbalized understanding.

## 2023-05-15 NOTE — TELEPHONE ENCOUNTER
Pt [place] city and [place]'t come in to office for an appt. Pt is asking if she is able to do a virtual visit. Please call.

## 2023-05-18 ENCOUNTER — TELEPHONE (OUTPATIENT)
Dept: PULMONOLOGY | Facility: CLINIC | Age: 58
End: 2023-05-18

## 2023-05-19 NOTE — TELEPHONE ENCOUNTER
LOV 2/15/22  LR 5/15/23    Per pharmacy tech this notice was sent to us in error & pt picked up Breo on 5/17.

## 2023-06-14 DIAGNOSIS — E78.5 HYPERLIPIDEMIA, UNSPECIFIED HYPERLIPIDEMIA TYPE: ICD-10-CM

## 2023-06-14 DIAGNOSIS — R73.03 PREDIABETES: ICD-10-CM

## 2023-06-14 DIAGNOSIS — Z00.00 ANNUAL PHYSICAL EXAM: ICD-10-CM

## 2023-06-14 DIAGNOSIS — J45.20 MILD INTERMITTENT ASTHMA WITHOUT COMPLICATION: ICD-10-CM

## 2023-06-14 DIAGNOSIS — R91.1 LUNG NODULE: ICD-10-CM

## 2023-06-14 DIAGNOSIS — K82.4 GALL BLADDER POLYP: ICD-10-CM

## 2023-06-14 DIAGNOSIS — I89.0 LYMPHEDEMA OF BOTH LOWER EXTREMITIES: ICD-10-CM

## 2023-06-14 DIAGNOSIS — J32.9 SINUSITIS, UNSPECIFIED CHRONICITY, UNSPECIFIED LOCATION: ICD-10-CM

## 2023-06-15 DIAGNOSIS — I89.0 LYMPHEDEMA OF BOTH LOWER EXTREMITIES: ICD-10-CM

## 2023-06-15 DIAGNOSIS — Z00.00 ANNUAL PHYSICAL EXAM: ICD-10-CM

## 2023-06-15 DIAGNOSIS — K82.4 GALL BLADDER POLYP: ICD-10-CM

## 2023-06-15 DIAGNOSIS — R91.1 LUNG NODULE: ICD-10-CM

## 2023-06-15 DIAGNOSIS — J32.9 SINUSITIS, UNSPECIFIED CHRONICITY, UNSPECIFIED LOCATION: ICD-10-CM

## 2023-06-15 DIAGNOSIS — J45.20 MILD INTERMITTENT ASTHMA WITHOUT COMPLICATION: ICD-10-CM

## 2023-06-15 DIAGNOSIS — R73.03 PREDIABETES: ICD-10-CM

## 2023-06-15 DIAGNOSIS — E78.5 HYPERLIPIDEMIA, UNSPECIFIED HYPERLIPIDEMIA TYPE: ICD-10-CM

## 2023-06-15 RX ORDER — CYCLOBENZAPRINE HCL 10 MG
10 TABLET ORAL 3 TIMES DAILY PRN
Qty: 30 TABLET | Refills: 1 | Status: SHIPPED | OUTPATIENT
Start: 2023-06-15

## 2023-06-15 RX ORDER — ALBUTEROL SULFATE 90 UG/1
1 AEROSOL, METERED RESPIRATORY (INHALATION) EVERY 6 HOURS PRN
Qty: 18 G | Refills: 0 | Status: SHIPPED | OUTPATIENT
Start: 2023-06-15 | End: 2023-06-16

## 2023-06-15 RX ORDER — ALBUTEROL SULFATE 90 UG/1
AEROSOL, METERED RESPIRATORY (INHALATION)
Qty: 18 G | Refills: 0 | Status: SHIPPED | OUTPATIENT
Start: 2023-06-15

## 2023-06-16 RX ORDER — ALBUTEROL SULFATE 2.5 MG/3ML
SOLUTION RESPIRATORY (INHALATION)
Qty: 100 ML | Refills: 0 | Status: SHIPPED | OUTPATIENT
Start: 2023-06-16

## 2023-06-16 NOTE — TELEPHONE ENCOUNTER
LOV: 2/15/2022 (upcoming 7/14/2023)  Last refill: 7/12/2022    Mary PISANO-please review and sign pended prescription if agreeable.

## 2023-07-14 ENCOUNTER — TELEMEDICINE (OUTPATIENT)
Dept: PULMONOLOGY | Facility: CLINIC | Age: 58
End: 2023-07-14

## 2023-07-14 DIAGNOSIS — R06.83 SNORING: ICD-10-CM

## 2023-07-14 DIAGNOSIS — R91.1 PULMONARY NODULE: ICD-10-CM

## 2023-07-14 DIAGNOSIS — J45.30 MILD PERSISTENT ASTHMA WITHOUT COMPLICATION: Primary | ICD-10-CM

## 2023-07-14 RX ORDER — FLUTICASONE FUROATE AND VILANTEROL 200; 25 UG/1; UG/1
1 POWDER RESPIRATORY (INHALATION) DAILY
Qty: 1 EACH | Refills: 0 | Status: SHIPPED | OUTPATIENT
Start: 2023-07-14

## 2023-07-14 NOTE — PROGRESS NOTES
Virtual Video Progress Note    Lynn Rodriguez verbally consents to to a Video Visit on 7/14/2023. Patient understands and accepts financial responsibility for any deductible, co-insurance and/or co-pays associated with this service. Please note that the following visit was completed using two-way, real-time interactive audio and/or video communication. This has been done in good michelle to provide continuity of care in the best interest of the provider-patient relationship, due to the ongoing public health crisis/national emergency and because of restrictions of visitation. There are limitations of this visit as the physical exam was observed through audio/video only. Every conscious effort was taken to allow for sufficient and adequate time. This billing was spent on reviewing labs, medications, radiology tests and decision making. Appropriate medical decision-making and tests are ordered as detailed in the plan of care above. History of Present Illness:  Jenae Pérez is a 62year old female presenting for virtual video visit today for follow-up. Patient has history of asthma. She is on Breo 100/25. She uses albuterol more than 2 times weekly which helps. She noticed her breathing was much better while on vacation in [place].  She has intermittent chest tightness and wheezing particularly at night. She awakens with wheezing 4-5 times per week. She has been prescribed Singulair in the past but did not try it due to potential side effects. She denies shortness of breath and cough. She has required prednisone once in the last year. No hospitalizations in the last year. She had CT scan of her chest in February 2023 for pulmonary nodule follow-up, which demonstrated stability of this micronodule. The patient notes her sleep has improved significantly since recovering from [facility]. She no longer struggles with insomnia. Polysomnography has been ordered in the past but not completed.   She does admit to snoring but no gasping for air or witnessed apneic events. Her sleep is refreshing and she denies significant daytime fatigue. She does awaken throughout the night but she attributes this to her wheezing. She wants to check with her insurance regarding coverage for home sleep study first.    Social History: Lifelong nonsmoker, no alcohol     Medications: Valacyclovir, triamterene hydrochlorothiazide, Flonase, Breo, cyclobenzaprine, betamethasone cream, albuterol    Physical Exam: Limited as this is a video visit. Patient is alert and oriented x3, pleasant and cooperative, speaking in full sentences with no labored breathing. Results:  -CT chest 2/2023: Right lower lobe pleural-based micronodule stable from 6/2022. -PFTs 8/2022: Normal.  FEV1 2.17 L (78%), FEV1/FVC 79%. No significant bronchodilator response. Normal lung volumes. Diffusion capacity 78%. Assessment/Plan:  Mild persistent asthma  On Breo 100/25 with near daily use of albuterol and nocturnal symptoms. Worse with recent air quality issues. No hospitalizations in the last year and required 1 course prednisone for asthma exacerbation. Plan:  -Increase Breo to 200/25  -Albuterol MDI or neb as needed  -Annual flu vaccine  -Trigger avoidance  -F/u in 1 year or sooner if needed    Snoring  Possible LACEY  Concern for LACEY in past. Sleep study ordered but not completed. Although symptoms of unrefreshing sleep improved with improvement in insomnia, she does snore and have nocturnal awakenings (which may be related to asthma). Discussed home sleep study but she wants to hold off at this time. Plan:  -Recommend home sleep study - patient declines  -Weight loss    Pulmonary nodule  Stable micronodule on CT chest 2/2023. Nonsmoker and overall low risk for lung cancer. No routine f/u imaging needed according to [facility] guidelines.   Plan:  -Expectant management    Shaista Marrufo PA-C  [facility], [facility] Pulmonary Medicine  7/14/2023

## 2023-07-15 ENCOUNTER — TELEPHONE (OUTPATIENT)
Dept: PULMONOLOGY | Facility: CLINIC | Age: 58
End: 2023-07-15

## 2023-07-15 NOTE — TELEPHONE ENCOUNTER
Current Outpatient Medications   Medication Sig Dispense Refill    fluticasone furoate-vilanterol (BREO ELLIPTA) 200-25 MCG/ACT Inhalation Aerosol Powder, Breath Activated Inhale 1 puff into the lungs daily. Rinse your mouth with water without swallowing after using BREO to help reduce your chance of getting thrush.  1 each 0     Not covered- pl send alternate

## 2023-07-17 NOTE — TELEPHONE ENCOUNTER
RN attempted to contact Liss via telephone, unable to get a hold of pharmacy staff. RN spoke with patient, confirmed they picked up medication and will begin using it on 7/18/2023. Informed patient of instructions on use. Patient sates medication was covered and no additional cost was charged. Patient states they will send Et3arraf message with update on results after starting medication and will reach out to pulmonary clinic for any refill requests. Nothing further needed from pulmonary clinic at this time.

## 2023-07-25 ENCOUNTER — TELEMEDICINE (OUTPATIENT)
Dept: INTERNAL MEDICINE CLINIC | Facility: CLINIC | Age: 58
End: 2023-07-25
Payer: COMMERCIAL

## 2023-07-25 DIAGNOSIS — M25.569 KNEE PAIN, UNSPECIFIED CHRONICITY, UNSPECIFIED LATERALITY: Primary | ICD-10-CM

## 2023-07-25 RX ORDER — MELOXICAM 15 MG/1
15 TABLET ORAL DAILY
Qty: 10 TABLET | Refills: 0 | Status: SHIPPED | OUTPATIENT
Start: 2023-07-25 | End: 2023-08-04

## 2023-07-25 NOTE — PROGRESS NOTES
I was 30 minutes behind on schedule   Patient cannnot complete the visit any more   Knee pain : advised to see ortho , mobic   Not to use with any other nsaids and only to take for a short term

## 2023-08-11 ENCOUNTER — PATIENT MESSAGE (OUTPATIENT)
Dept: PULMONOLOGY | Facility: CLINIC | Age: 58
End: 2023-08-11

## 2023-08-11 NOTE — TELEPHONE ENCOUNTER
From: Nicki Argueta  To: Georgette Freire PA-C  Sent: 8/11/2023 12:46 PM CDT  Subject: Video Appt Follow Up -Breo 200     Ede Alvarenga     You asked me to give you an update on the use of Breo 200.  1. It seems to last longer during the day  2. I stopped wheezing at night (only on an occasion)    Cons  1. It makes me slightly hoarse  2. I seem to have a little congestion in the back of the throat. The hoarseness has good and bad days but ultimately my voice is not clear unless i use nothing. I am out of the [facility] as you only auth'd the 1 month to try. I will need refills of either the 100 or 200.  Thank you

## 2023-08-14 RX ORDER — FLUTICASONE PROPIONATE AND SALMETEROL XINAFOATE 230; 21 UG/1; UG/1
2 AEROSOL, METERED RESPIRATORY (INHALATION) 2 TIMES DAILY
Qty: 1 EACH | Refills: 0 | Status: SHIPPED | OUTPATIENT
Start: 2023-08-14

## 2023-08-18 ENCOUNTER — TELEPHONE (OUTPATIENT)
Dept: PULMONOLOGY | Facility: CLINIC | Age: 58
End: 2023-08-18

## 2023-08-18 NOTE — TELEPHONE ENCOUNTER
Regarding: Video Appt Follow Up -Breo 200   Contact: [phone]  Ede Alvarenga, my Rx portion of my insurance is not approving the [id number]. They want to approve the generic and I dont want the generic. I have been taking 2 puffs of the Breo 100 for the last 3 days and I'm fine. Can you send in a prescription for the Breo 200? I've never had insurance issues with Breo.

## 2023-08-18 NOTE — TELEPHONE ENCOUNTER
Grace Hackett wants Breo prescription since her insurance is not approving advair hfa. She is not willing to take advair hfa generic. Would you like to prescribe Breo 200?

## 2023-08-21 RX ORDER — FLUTICASONE FUROATE AND VILANTEROL 200; 25 UG/1; UG/1
1 POWDER RESPIRATORY (INHALATION) DAILY
Qty: 60 EACH | Refills: 5 | Status: SHIPPED | OUTPATIENT
Start: 2023-08-21

## 2023-08-21 RX ORDER — FLUTICASONE FUROATE AND VILANTEROL TRIFENATATE 200; 25 UG/1; UG/1
1 POWDER RESPIRATORY (INHALATION) DAILY
Qty: 60 EACH | Refills: 5 | Status: SHIPPED | OUTPATIENT
Start: 2023-08-21

## 2023-08-22 RX ORDER — FLUTICASONE FUROATE AND VILANTEROL TRIFENATATE 200; 25 UG/1; UG/1
1 POWDER RESPIRATORY (INHALATION) DAILY
Qty: 60 EACH | Refills: 0 | OUTPATIENT
Start: 2023-08-22

## 2023-09-28 ENCOUNTER — OFFICE VISIT (OUTPATIENT)
Dept: INTERNAL MEDICINE CLINIC | Facility: CLINIC | Age: 58
End: 2023-09-28
Payer: COMMERCIAL

## 2023-09-28 VITALS
OXYGEN SATURATION: 96 % | HEART RATE: 77 BPM | HEIGHT: 66 IN | DIASTOLIC BLOOD PRESSURE: 78 MMHG | WEIGHT: 293 LBS | BODY MASS INDEX: 47.09 KG/M2 | SYSTOLIC BLOOD PRESSURE: 140 MMHG

## 2023-09-28 DIAGNOSIS — E78.5 HYPERLIPIDEMIA, UNSPECIFIED HYPERLIPIDEMIA TYPE: ICD-10-CM

## 2023-09-28 DIAGNOSIS — I10 ESSENTIAL HYPERTENSION: Primary | ICD-10-CM

## 2023-09-28 DIAGNOSIS — R59.0 CERVICAL LYMPHADENOPATHY: ICD-10-CM

## 2023-09-28 DIAGNOSIS — K82.4 GALL BLADDER POLYP: ICD-10-CM

## 2023-09-28 LAB
ALBUMIN SERPL-MCNC: 4 G/DL (ref 3.4–5)
ALBUMIN/GLOB SERPL: 0.9 {RATIO} (ref 1–2)
ALP LIVER SERPL-CCNC: 119 U/L
ALT SERPL-CCNC: 23 U/L
ANION GAP SERPL CALC-SCNC: 9 MMOL/L (ref 0–18)
AST SERPL-CCNC: 16 U/L (ref 15–37)
BASOPHILS # BLD AUTO: 0.04 X10(3) UL (ref 0–0.2)
BASOPHILS NFR BLD AUTO: 0.8 %
BILIRUB SERPL-MCNC: 0.5 MG/DL (ref 0.1–2)
BUN BLD-MCNC: 14 MG/DL (ref 7–18)
BUN/CREAT SERPL: 13.6 (ref 10–20)
CALCIUM BLD-MCNC: 10.1 MG/DL (ref 8.5–10.1)
CHLORIDE SERPL-SCNC: 104 MMOL/L (ref 98–112)
CHOLEST SERPL-MCNC: 252 MG/DL (ref ?–200)
CO2 SERPL-SCNC: 28 MMOL/L (ref 21–32)
CREAT BLD-MCNC: 1.03 MG/DL
DEPRECATED RDW RBC AUTO: 43.2 FL (ref 35.1–46.3)
EGFRCR SERPLBLD CKD-EPI 2021: 63 ML/MIN/1.73M2 (ref 60–?)
EOSINOPHIL # BLD AUTO: 0.06 X10(3) UL (ref 0–0.7)
EOSINOPHIL NFR BLD AUTO: 1.3 %
ERYTHROCYTE [DISTWIDTH] IN BLOOD BY AUTOMATED COUNT: 13.2 % (ref 11–15)
EST. AVERAGE GLUCOSE BLD GHB EST-MCNC: 114 MG/DL (ref 68–126)
FASTING PATIENT LIPID ANSWER: YES
FASTING STATUS PATIENT QL REPORTED: YES
GLOBULIN PLAS-MCNC: 4.6 G/DL (ref 2.8–4.4)
GLUCOSE BLD-MCNC: 101 MG/DL (ref 70–99)
HBA1C MFR BLD: 5.6 % (ref ?–5.7)
HCT VFR BLD AUTO: 41.3 %
HDLC SERPL-MCNC: 69 MG/DL (ref 40–59)
HGB BLD-MCNC: 13.5 G/DL
IMM GRANULOCYTES # BLD AUTO: 0.01 X10(3) UL (ref 0–1)
IMM GRANULOCYTES NFR BLD: 0.2 %
LDLC SERPL CALC-MCNC: 173 MG/DL (ref ?–100)
LYMPHOCYTES # BLD AUTO: 1.66 X10(3) UL (ref 1–4)
LYMPHOCYTES NFR BLD AUTO: 34.6 %
MCH RBC QN AUTO: 29 PG (ref 26–34)
MCHC RBC AUTO-ENTMCNC: 32.7 G/DL (ref 31–37)
MCV RBC AUTO: 88.6 FL
MONOCYTES # BLD AUTO: 0.4 X10(3) UL (ref 0.1–1)
MONOCYTES NFR BLD AUTO: 8.3 %
NEUTROPHILS # BLD AUTO: 2.63 X10 (3) UL (ref 1.5–7.7)
NEUTROPHILS # BLD AUTO: 2.63 X10(3) UL (ref 1.5–7.7)
NEUTROPHILS NFR BLD AUTO: 54.8 %
NONHDLC SERPL-MCNC: 183 MG/DL (ref ?–130)
OSMOLALITY SERPL CALC.SUM OF ELEC: 293 MOSM/KG (ref 275–295)
PLATELET # BLD AUTO: 371 10(3)UL (ref 150–450)
POTASSIUM SERPL-SCNC: 3.9 MMOL/L (ref 3.5–5.1)
PROT SERPL-MCNC: 8.6 G/DL (ref 6.4–8.2)
RBC # BLD AUTO: 4.66 X10(6)UL
SODIUM SERPL-SCNC: 141 MMOL/L (ref 136–145)
T3FREE SERPL-MCNC: 3.71 PG/ML (ref 2.4–4.2)
T4 FREE SERPL-MCNC: 1.4 NG/DL (ref 0.8–1.7)
TRIGL SERPL-MCNC: 63 MG/DL (ref 30–149)
TSI SER-ACNC: 0.05 MIU/ML (ref 0.36–3.74)
VLDLC SERPL CALC-MCNC: 13 MG/DL (ref 0–30)
WBC # BLD AUTO: 4.8 X10(3) UL (ref 4–11)

## 2023-09-28 PROCEDURE — 99214 OFFICE O/P EST MOD 30 MIN: CPT | Performed by: INTERNAL MEDICINE

## 2023-09-28 PROCEDURE — 3078F DIAST BP <80 MM HG: CPT | Performed by: INTERNAL MEDICINE

## 2023-09-28 PROCEDURE — 84481 FREE ASSAY (FT-3): CPT | Performed by: INTERNAL MEDICINE

## 2023-09-28 PROCEDURE — 3008F BODY MASS INDEX DOCD: CPT | Performed by: INTERNAL MEDICINE

## 2023-09-28 PROCEDURE — 83036 HEMOGLOBIN GLYCOSYLATED A1C: CPT | Performed by: INTERNAL MEDICINE

## 2023-09-28 PROCEDURE — 80050 GENERAL HEALTH PANEL: CPT | Performed by: INTERNAL MEDICINE

## 2023-09-28 PROCEDURE — 84439 ASSAY OF FREE THYROXINE: CPT | Performed by: INTERNAL MEDICINE

## 2023-09-28 PROCEDURE — 80061 LIPID PANEL: CPT | Performed by: INTERNAL MEDICINE

## 2023-09-28 PROCEDURE — 3077F SYST BP >= 140 MM HG: CPT | Performed by: INTERNAL MEDICINE

## 2023-09-28 RX ORDER — TRIAMTERENE AND HYDROCHLOROTHIAZIDE 37.5; 25 MG/1; MG/1
1 CAPSULE ORAL DAILY
Qty: 90 CAPSULE | Refills: 3 | Status: SHIPPED | OUTPATIENT
Start: 2023-09-28

## 2023-09-28 RX ORDER — LOSARTAN POTASSIUM 50 MG/1
50 TABLET ORAL DAILY
Qty: 90 TABLET | Refills: 1 | Status: SHIPPED | OUTPATIENT
Start: 2023-09-28 | End: 2023-12-27

## 2023-09-28 RX ORDER — AZITHROMYCIN 250 MG/1
TABLET, FILM COATED ORAL
COMMUNITY
Start: 2023-06-22

## 2023-10-03 DIAGNOSIS — E05.90 HYPERTHYROIDISM: Primary | ICD-10-CM

## 2023-10-03 RX ORDER — ROSUVASTATIN CALCIUM 10 MG/1
10 TABLET, COATED ORAL NIGHTLY
Qty: 90 TABLET | Refills: 1 | Status: SHIPPED | OUTPATIENT
Start: 2023-10-03 | End: 2024-01-01

## 2023-11-09 DIAGNOSIS — R73.03 PREDIABETES: ICD-10-CM

## 2023-11-09 DIAGNOSIS — J45.20 MILD INTERMITTENT ASTHMA WITHOUT COMPLICATION: ICD-10-CM

## 2023-11-09 DIAGNOSIS — Z00.00 ANNUAL PHYSICAL EXAM: ICD-10-CM

## 2023-11-09 DIAGNOSIS — I89.0 LYMPHEDEMA OF BOTH LOWER EXTREMITIES: ICD-10-CM

## 2023-11-09 DIAGNOSIS — K82.4 GALL BLADDER POLYP: ICD-10-CM

## 2023-11-09 DIAGNOSIS — R91.1 LUNG NODULE: ICD-10-CM

## 2023-11-09 DIAGNOSIS — E78.5 HYPERLIPIDEMIA, UNSPECIFIED HYPERLIPIDEMIA TYPE: ICD-10-CM

## 2023-11-09 DIAGNOSIS — J32.9 SINUSITIS, UNSPECIFIED CHRONICITY, UNSPECIFIED LOCATION: ICD-10-CM

## 2023-11-09 RX ORDER — FLUTICASONE PROPIONATE 50 MCG
2 SPRAY, SUSPENSION (ML) NASAL DAILY
Qty: 15.8 ML | Refills: 0 | Status: SHIPPED | OUTPATIENT
Start: 2023-11-09

## 2023-11-29 ENCOUNTER — PATIENT MESSAGE (OUTPATIENT)
Dept: INTERNAL MEDICINE CLINIC | Facility: CLINIC | Age: 58
End: 2023-11-29

## 2023-11-29 RX ORDER — AZITHROMYCIN 250 MG/1
TABLET, FILM COATED ORAL
Qty: 6 TABLET | Refills: 0 | Status: SHIPPED | OUTPATIENT
Start: 2023-11-29 | End: 2023-12-03

## 2023-11-30 NOTE — TELEPHONE ENCOUNTER
Malachi Walker, [facility] [place] 11/29/2023 8:50 AM CST      ----- Message -----  From: Ashanti Lozano  Sent: 11/29/2023 7:36 AM CST  To: Jade Varela Clinical Staff  Subject: Sinus Infection     Hello. I have a sinus infection that I get every year. Can I get a Phani August called in please?  Liss Rosa you

## 2023-12-06 ENCOUNTER — PATIENT MESSAGE (OUTPATIENT)
Dept: PULMONOLOGY | Facility: CLINIC | Age: 58
End: 2023-12-06

## 2023-12-06 ENCOUNTER — TELEPHONE (OUTPATIENT)
Dept: PULMONOLOGY | Facility: CLINIC | Age: 58
End: 2023-12-06

## 2023-12-06 RX ORDER — PREDNISONE 20 MG/1
TABLET ORAL
Qty: 10 TABLET | Refills: 0 | Status: SHIPPED | OUTPATIENT
Start: 2023-12-06

## 2023-12-06 NOTE — TELEPHONE ENCOUNTER
----- Message from Donna Mcguire sent at 12/6/2023  2:27 PM CST -----  Regarding: Prednisone  Contact: [phone]  Ede Alvarenga,     Sometimes, not often, I need a low dose steroid when the asthmas meds aren't working and I'm a little inflamed. I am experiencing that now. Can I get a dose of prednisone? I've had it several time before and of course it works. The Breo, rescue inhaler or the nebulizer is not working for the last week. No wheezing must a little tightness and cough when I breathe in deeply.  Thank you

## 2024-01-22 ENCOUNTER — PATIENT MESSAGE (OUTPATIENT)
Dept: INTERNAL MEDICINE CLINIC | Facility: CLINIC | Age: 59
End: 2024-01-22

## 2024-01-22 RX ORDER — FLUCONAZOLE 150 MG/1
150 TABLET ORAL ONCE
Qty: 1 TABLET | Refills: 0 | Status: SHIPPED | OUTPATIENT
Start: 2024-01-22 | End: 2024-01-22

## 2024-01-22 NOTE — TELEPHONE ENCOUNTER
From: Krysten Mcguire  To: Surinder Smyth  Sent: 1/22/2024 7:52 AM CST  Subject: Yeast Infection    Hello. I had a tooth pulled and was prescribed 500mg penicillin for 5 days and I now have a yeast infection. Can I get a prescription for Diflucan? It's less expensive than the OTC brand    Thank you

## 2024-02-02 RX ORDER — FLUCONAZOLE 150 MG/1
150 TABLET ORAL ONCE
Qty: 1 TABLET | Refills: 0 | OUTPATIENT
Start: 2024-02-02 | End: 2024-02-02

## 2024-02-19 ENCOUNTER — PATIENT MESSAGE (OUTPATIENT)
Dept: PULMONOLOGY | Facility: CLINIC | Age: 59
End: 2024-02-19

## 2024-02-19 NOTE — TELEPHONE ENCOUNTER
From: Krysten Mcguire  To: Lyle Sanchez  Sent: 2/19/2024 2:29 PM CST  Subject: Asthma Meds    Lyle can you call me asap. I am out of breo and my insurance is now charging 250.00 for my meds. I need to speak with you please.  [phone]   Thanks

## 2024-02-19 NOTE — TELEPHONE ENCOUNTER
I spoke with the patient. She has Wixela 100/50 at home which she can start 1 puff BID. Advised to follow up via phone/MyChart in a few weeks to let me know how she is doing as dose is lower. She will call her insurance company to inquire about covered alternatives.

## 2024-03-02 DIAGNOSIS — I89.0 LYMPHEDEMA OF BOTH LOWER EXTREMITIES: ICD-10-CM

## 2024-03-02 DIAGNOSIS — R73.03 PREDIABETES: ICD-10-CM

## 2024-03-02 DIAGNOSIS — Z00.00 ANNUAL PHYSICAL EXAM: ICD-10-CM

## 2024-03-02 DIAGNOSIS — K82.4 GALL BLADDER POLYP: ICD-10-CM

## 2024-03-02 DIAGNOSIS — R91.1 LUNG NODULE: ICD-10-CM

## 2024-03-02 DIAGNOSIS — E78.5 HYPERLIPIDEMIA, UNSPECIFIED HYPERLIPIDEMIA TYPE: ICD-10-CM

## 2024-03-02 DIAGNOSIS — J32.9 SINUSITIS, UNSPECIFIED CHRONICITY, UNSPECIFIED LOCATION: ICD-10-CM

## 2024-03-02 DIAGNOSIS — J45.20 MILD INTERMITTENT ASTHMA WITHOUT COMPLICATION (HCC): ICD-10-CM

## 2024-03-02 RX ORDER — ALBUTEROL SULFATE 90 UG/1
1 AEROSOL, METERED RESPIRATORY (INHALATION) EVERY 6 HOURS PRN
Qty: 18 G | Refills: 0 | Status: SHIPPED | OUTPATIENT
Start: 2024-03-02 | End: 2024-03-02

## 2024-03-02 RX ORDER — ALBUTEROL SULFATE 90 UG/1
1 AEROSOL, METERED RESPIRATORY (INHALATION) EVERY 6 HOURS PRN
Qty: 8.5 G | Refills: 0 | Status: SHIPPED | OUTPATIENT
Start: 2024-03-02

## 2024-03-09 ENCOUNTER — PATIENT MESSAGE (OUTPATIENT)
Dept: PULMONOLOGY | Facility: CLINIC | Age: 59
End: 2024-03-09

## 2024-03-11 NOTE — TELEPHONE ENCOUNTER
From: Krysten Mcguire  To: Lyle Sanchez  Sent: 3/9/2024 9:58 AM CST  Subject: Advair Side Effects     Hi Lyle    I am having side effects from the Advair again. I called the [facility] on [facility] in [place] and they stated there is a generic Breo (fluticasone vilanterol) however they could not give me a price until a prescription is called in. Can you or someone call [facility] on [facility] [phone] to see what they need in order for me to get a price quote? Breo 200/25 worked best for me. Thank you

## 2024-03-12 RX ORDER — FLUTICASONE FUROATE AND VILANTEROL 200; 25 UG/1; UG/1
1 POWDER RESPIRATORY (INHALATION) DAILY
Qty: 1 EACH | Refills: 2 | Status: SHIPPED | OUTPATIENT
Start: 2024-03-12

## 2024-03-20 RX ORDER — BUDESONIDE AND FORMOTEROL FUMARATE DIHYDRATE 160; 4.5 UG/1; UG/1
2 AEROSOL RESPIRATORY (INHALATION) 2 TIMES DAILY
Qty: 1 EACH | Refills: 5 | Status: SHIPPED | OUTPATIENT
Start: 2024-03-20

## 2024-03-20 RX ORDER — ALBUTEROL SULFATE 2.5 MG/3ML
2.5 SOLUTION RESPIRATORY (INHALATION) EVERY 6 HOURS PRN
Qty: 100 ML | Refills: 2 | Status: SHIPPED | OUTPATIENT
Start: 2024-03-20

## 2024-03-20 NOTE — TELEPHONE ENCOUNTER
[facility] generic Symbicort or generic Advair.    Lyle PISANO - Patient had reaction to generic Advair, do you want to order generic Symbicort for patient?  She is currently paying for Breo.  See [employer] message 3/9/24.

## 2024-03-20 NOTE — TELEPHONE ENCOUNTER
Spoke with patient, informed her of Lyle PISANO's order, patient verbalized understanding.    Lyle PISANO - Patient also requesting refill of albuterol neb solution, pended if agreeable

## 2024-03-21 NOTE — PROCEDURES
DISCHARGE SUMMARY    Nj Capellan was seen 3 times for evaluation and treatment.  Patient did not return for further treatment and current status is unknown.  Due to short treatment duration, no objective or functional changes were made.  Please see goal flow sheet from episode noted date below and initial evaluation for further information.  Patient is discharged from therapy and therapy episode is resolved as of 03/21/24.      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

## 2024-04-05 ENCOUNTER — HOSPITAL ENCOUNTER (OUTPATIENT)
Dept: MAMMOGRAPHY | Age: 59
Discharge: HOME | End: 2024-04-05
Attending: INTERNAL MEDICINE
Payer: COMMERCIAL

## 2024-04-05 DIAGNOSIS — Z12.39 BREAST CANCER SCREENING: ICD-10-CM

## 2024-04-05 DIAGNOSIS — Z12.31 ENCOUNTER FOR SCREENING MAMMOGRAM FOR MALIGNANT NEOPLASM OF BREAST: ICD-10-CM

## 2024-04-05 PROCEDURE — 77067 SCR MAMMO BI INCL CAD: CPT | Performed by: INTERNAL MEDICINE

## 2024-04-05 PROCEDURE — 77063 BREAST TOMOSYNTHESIS BI: CPT | Performed by: INTERNAL MEDICINE

## 2024-06-03 ENCOUNTER — PATIENT MESSAGE (OUTPATIENT)
Dept: PULMONOLOGY | Facility: CLINIC | Age: 59
End: 2024-06-03

## 2024-06-03 RX ORDER — PREDNISONE 20 MG/1
TABLET ORAL
Qty: 10 TABLET | Refills: 0 | Status: SHIPPED | OUTPATIENT
Start: 2024-06-03

## 2024-06-03 NOTE — TELEPHONE ENCOUNTER
Spoke to patient. She is asking for a prescription for short course of prednisone to help with her breathing. Patient taking Breo inhaler and using albuterol inhaler 1-2 times a day max. Patient tried to wait and see if shortness of breath went away on it's own but it hasn't and patient is leaving to go on vacation June 19th.

## 2024-06-03 NOTE — TELEPHONE ENCOUNTER
From: Krysten Mcguire  To: Lyle Sanchez  Sent: 6/3/2024 12:25 PM CDT  Subject: Prednisone    Hi Michelle    Can you give me a call when time permits? Thanks

## 2024-07-08 ENCOUNTER — OFFICE VISIT (OUTPATIENT)
Dept: INTERNAL MEDICINE CLINIC | Facility: CLINIC | Age: 59
End: 2024-07-08
Payer: COMMERCIAL

## 2024-07-08 DIAGNOSIS — I89.0 LYMPHEDEMA OF BOTH LOWER EXTREMITIES: ICD-10-CM

## 2024-07-08 DIAGNOSIS — E78.5 HYPERLIPIDEMIA, UNSPECIFIED HYPERLIPIDEMIA TYPE: ICD-10-CM

## 2024-07-08 DIAGNOSIS — Z13.29 THYROID DISORDER SCREEN: ICD-10-CM

## 2024-07-08 DIAGNOSIS — R73.03 PREDIABETES: ICD-10-CM

## 2024-07-08 DIAGNOSIS — I10 ESSENTIAL HYPERTENSION: ICD-10-CM

## 2024-07-08 DIAGNOSIS — Z00.00 WELLNESS EXAMINATION: Primary | ICD-10-CM

## 2024-07-08 DIAGNOSIS — J32.9 SINUSITIS, UNSPECIFIED CHRONICITY, UNSPECIFIED LOCATION: ICD-10-CM

## 2024-07-08 DIAGNOSIS — J45.20 MILD INTERMITTENT ASTHMA WITHOUT COMPLICATION (HCC): ICD-10-CM

## 2024-07-08 DIAGNOSIS — Z13.220 LIPID SCREENING: ICD-10-CM

## 2024-07-08 DIAGNOSIS — Z13.0 SCREENING FOR DEFICIENCY ANEMIA: ICD-10-CM

## 2024-07-08 DIAGNOSIS — Z13.21 ENCOUNTER FOR VITAMIN DEFICIENCY SCREENING: ICD-10-CM

## 2024-07-08 DIAGNOSIS — Z12.11 SCREENING FOR COLON CANCER: ICD-10-CM

## 2024-07-08 LAB
ALBUMIN SERPL-MCNC: 4.2 G/DL (ref 3.2–4.8)
ALBUMIN/GLOB SERPL: 1.2 {RATIO} (ref 1–2)
ALP LIVER SERPL-CCNC: 112 U/L
ALT SERPL-CCNC: 16 U/L
ANION GAP SERPL CALC-SCNC: 5 MMOL/L (ref 0–18)
AST SERPL-CCNC: 19 U/L (ref ?–34)
BASOPHILS # BLD AUTO: 0.03 X10(3) UL (ref 0–0.2)
BASOPHILS NFR BLD AUTO: 0.6 %
BILIRUB SERPL-MCNC: 0.4 MG/DL (ref 0.3–1.2)
BUN BLD-MCNC: 14 MG/DL (ref 9–23)
BUN/CREAT SERPL: 15.1 (ref 10–20)
CALCIUM BLD-MCNC: 9.7 MG/DL (ref 8.7–10.4)
CHLORIDE SERPL-SCNC: 106 MMOL/L (ref 98–112)
CHOLEST SERPL-MCNC: 256 MG/DL (ref ?–200)
CO2 SERPL-SCNC: 29 MMOL/L (ref 21–32)
CREAT BLD-MCNC: 0.93 MG/DL
DEPRECATED RDW RBC AUTO: 46 FL (ref 35.1–46.3)
EGFRCR SERPLBLD CKD-EPI 2021: 71 ML/MIN/1.73M2 (ref 60–?)
EOSINOPHIL # BLD AUTO: 0.08 X10(3) UL (ref 0–0.7)
EOSINOPHIL NFR BLD AUTO: 1.5 %
ERYTHROCYTE [DISTWIDTH] IN BLOOD BY AUTOMATED COUNT: 13.7 % (ref 11–15)
FASTING PATIENT LIPID ANSWER: YES
FASTING STATUS PATIENT QL REPORTED: YES
GLOBULIN PLAS-MCNC: 3.5 G/DL (ref 2–3.5)
GLUCOSE BLD-MCNC: 80 MG/DL (ref 70–99)
HCT VFR BLD AUTO: 39.3 %
HDLC SERPL-MCNC: 63 MG/DL (ref 40–59)
HGB BLD-MCNC: 12.9 G/DL
IMM GRANULOCYTES # BLD AUTO: 0.01 X10(3) UL (ref 0–1)
IMM GRANULOCYTES NFR BLD: 0.2 %
LDLC SERPL CALC-MCNC: 180 MG/DL (ref ?–100)
LYMPHOCYTES # BLD AUTO: 2.03 X10(3) UL (ref 1–4)
LYMPHOCYTES NFR BLD AUTO: 39.2 %
MCH RBC QN AUTO: 29.7 PG (ref 26–34)
MCHC RBC AUTO-ENTMCNC: 32.8 G/DL (ref 31–37)
MCV RBC AUTO: 90.6 FL
MONOCYTES # BLD AUTO: 0.42 X10(3) UL (ref 0.1–1)
MONOCYTES NFR BLD AUTO: 8.1 %
NEUTROPHILS # BLD AUTO: 2.61 X10 (3) UL (ref 1.5–7.7)
NEUTROPHILS # BLD AUTO: 2.61 X10(3) UL (ref 1.5–7.7)
NEUTROPHILS NFR BLD AUTO: 50.4 %
NONHDLC SERPL-MCNC: 193 MG/DL (ref ?–130)
OSMOLALITY SERPL CALC.SUM OF ELEC: 289 MOSM/KG (ref 275–295)
PLATELET # BLD AUTO: 402 10(3)UL (ref 150–450)
POTASSIUM SERPL-SCNC: 4.1 MMOL/L (ref 3.5–5.1)
PROT SERPL-MCNC: 7.7 G/DL (ref 5.7–8.2)
RBC # BLD AUTO: 4.34 X10(6)UL
SODIUM SERPL-SCNC: 140 MMOL/L (ref 136–145)
TRIGL SERPL-MCNC: 76 MG/DL (ref 30–149)
TSI SER-ACNC: 1.88 MIU/ML (ref 0.55–4.78)
VIT D+METAB SERPL-MCNC: 29.1 NG/ML (ref 30–100)
VLDLC SERPL CALC-MCNC: 15 MG/DL (ref 0–30)
WBC # BLD AUTO: 5.2 X10(3) UL (ref 4–11)

## 2024-07-08 PROCEDURE — 3075F SYST BP GE 130 - 139MM HG: CPT

## 2024-07-08 PROCEDURE — 80061 LIPID PANEL: CPT

## 2024-07-08 PROCEDURE — 82306 VITAMIN D 25 HYDROXY: CPT

## 2024-07-08 PROCEDURE — 99396 PREV VISIT EST AGE 40-64: CPT

## 2024-07-08 PROCEDURE — 80050 GENERAL HEALTH PANEL: CPT

## 2024-07-08 PROCEDURE — 3008F BODY MASS INDEX DOCD: CPT

## 2024-07-08 PROCEDURE — 3079F DIAST BP 80-89 MM HG: CPT

## 2024-07-08 RX ORDER — FLUCONAZOLE 150 MG/1
TABLET ORAL
COMMUNITY
Start: 2024-02-02 | End: 2024-07-08 | Stop reason: ALTCHOICE

## 2024-07-08 RX ORDER — FLUTICASONE PROPIONATE 50 MCG
2 SPRAY, SUSPENSION (ML) NASAL DAILY
Qty: 15.8 ML | Refills: 2 | Status: SHIPPED | OUTPATIENT
Start: 2024-07-08

## 2024-07-08 RX ORDER — METOCLOPRAMIDE 10 MG/1
TABLET ORAL
COMMUNITY
Start: 2024-07-05 | End: 2024-07-08 | Stop reason: ALTCHOICE

## 2024-07-08 RX ORDER — AMLODIPINE BESYLATE 5 MG/1
5 TABLET ORAL DAILY
Qty: 90 TABLET | Refills: 0 | Status: SHIPPED | OUTPATIENT
Start: 2024-07-08 | End: 2024-07-11 | Stop reason: ALTCHOICE

## 2024-07-08 RX ORDER — CHLORHEXIDINE GLUCONATE ORAL RINSE 1.2 MG/ML
SOLUTION DENTAL
COMMUNITY
Start: 2024-01-15 | End: 2024-07-08 | Stop reason: ALTCHOICE

## 2024-07-08 NOTE — PROGRESS NOTES
HPI:   Krysten Mcguire is a 59 year old female who presents for a complete physical exam.  Last pap: 7/2021  Menses: Postmenopausal  Exercise: Trying to exercise more regularly, has access to exercise path    Needs some medication refills: Flonase.  Wants to wean down on Diuretic     Wt Readings from Last 6 Encounters:   07/08/24 (!) 322 lb 12.8 oz (146.4 kg)   09/28/23 (!) 320 lb 3.2 oz (145.2 kg)   12/06/22 (!) 318 lb 9.6 oz (144.5 kg)   04/19/22 (!) 319 lb 3.2 oz (144.8 kg)   02/15/22 (!) 317 lb (143.8 kg)   07/19/21 (!) 317 lb 6.4 oz (144 kg)     Body mass index is 52.1 kg/m².     Cholesterol, Total (mg/dL)   Date Value   09/28/2023 252 (H)   12/06/2022 281 (H)   04/15/2022 246 (H)     HDL Cholesterol (mg/dL)   Date Value   09/28/2023 69 (H)   12/06/2022 64 (H)   04/15/2022 57     LDL Cholesterol (mg/dL)   Date Value   09/28/2023 173 (H)   12/06/2022 200 (H)   04/15/2022 176 (H)        Current Outpatient Medications   Medication Sig Dispense Refill    fluconazole 150 MG Oral Tab       chlorhexidine gluconate 0.12 % Mouth/Throat Solution       metoclopramide 10 MG Oral Tab       fluticasone furoate-vilanterol (BREO ELLIPTA) 200-25 MCG/ACT Inhalation Aerosol Powder, Breath Activated Inhale 1 puff into the lungs daily. Rinse your mouth with water without swallowing after using BREO to help reduce your chance of getting thrush. 3 each 1    predniSONE 20 MG Oral Tab 2 tabs daily for 3 days then one tab daily until completed 10 tablet 0    albuterol (2.5 MG/3ML) 0.083% Inhalation Nebu Soln Take 3 mL (2.5 mg total) by nebulization every 6 (six) hours as needed for Wheezing or Shortness of Breath. 100 mL 2    albuterol 108 (90 Base) MCG/ACT Inhalation Aero Soln Inhale 1 puff into the lungs every 6 (six) hours as needed for Wheezing. 8.5 g 0    predniSONE 20 MG Oral Tab 2 tabs daily for 3 days then one tab daily until completed 10 tablet 0    fluticasone propionate 50 MCG/ACT Nasal Suspension 2 sprays by Each Nare route  daily. 15.8 mL 0    azithromycin 250 MG Oral Tab       triamterene-hydroCHLOROthiazide 37.5-25 MG Oral Cap Take 1 capsule by mouth daily. 90 capsule 3    betamethasone 0.1 % External Cream Apply 1 Application  topically 2 (two) times daily. 45 g 0    cyclobenzaprine 10 MG Oral Tab Take 1 tablet (10 mg total) by mouth 3 (three) times daily as needed for Muscle spasms. 30 tablet 1    VALACYCLOVIR 1 G Oral Tab TAKE 1 TABLET BY MOUTH EVERY  tablet 0    acetaminophen 500 MG Oral Tab Take 2 tablets (1,000 mg total) by mouth every 6 (six) hours as needed for Pain.        Past Medical History:    Arthritis    Asthma (HCC)    Colon adenomas    Colon polyp    Constipation    Had it since childhood    Disorder of thyroid    Diverticular disease    Esophageal reflux    Essential hypertension    Herpes    High blood pressure    High cholesterol    Hyperlipidemia    Hyperthyroidism    Morbid obesity (HCC)      Past Surgical History:   Procedure Laterality Date    Colonoscopy  2014    Colonoscopy N/A 12/2/2019    Procedure: COLONOSCOPY;  Surgeon: Ephraim Barros MD;  Location: [facility] ENDOSCOPY    Endoscopy, bowel pouch, biopsy  10/03/2018    Knee surgery  2012    [facility] localization wire 1 site right (cpt=19281)        Family History   Problem Relation Age of Onset    Colon Polyps Father     Diabetes Father     Thyroid Disorder Father     Heart Disease Mother     Thyroid Disorder Mother     Thyroid Disorder Sister     Thyroid Disorder Brother     Thyroid Disorder Niece     Breast Cancer Niece 33      Social History:   Social History     Socioeconomic History    Marital status: Single   Tobacco Use    Smoking status: Never    Smokeless tobacco: Never   Vaping Use    Vaping status: Never Used   Substance and Sexual Activity    Alcohol use: No    Drug use: No     Social Determinants of Health      [facility]    Social Connections    Received from [facility]  [facility], [facility]    Housing Stability          REVIEW OF SYSTEMS:   GENERAL: feels well otherwise  SKIN: denies any unusual skin lesions or rashes  EYES:no report of vision change.   HEENT: no ear and throat pain. no hearing changes   LUNGS: denies cough nor Shortness of breath with exertion  CARDIOVASCULAR: denies chest pain on exertion or palpitations.  GI: denies abdominal pain, had a recent bout of diarrhea prior to travel to [place] and then a flareup at the end of travel.  This has resolved  : denies incontinence, nocturia or changes in stream  NEURO: No report headaches or lightheadedness  PSYCHE: No report of depression or anxiety    EXAM:   /80   Pulse 68   Temp 98.7 °F (37.1 °C)   Ht 5' 6\" (1.676 m)   Wt (!) 322 lb 12.8 oz (146.4 kg)   SpO2 95%   BMI 52.10 kg/m²   Body mass index is 52.1 kg/m². B/P 138/80  Repeat   GENERAL: well developed, well nourished,in no apparent distress  SKIN: no rashes,no suspicious lesions  HEENT: atraumatic, normocephalic, O/P normal  EYES: nl conjunctivae,  EOMI  NECK: supple,no adenopathy, no thyroid tenderness  LUNGS: clear to auscultation  CARDIO: RRR without murmur.  Carotids without bruits  GI: good BS's,no masses, soft, habitus commensurate with BMI- limited exam, no HSM or tenderness  : Several attempts to insert medium and small size speculum.  Not able to visualize or prod cervix into speculum for ThinPrep testing (referred to gynecology for extra long speculum).  Has some vaginal wall dryness and slight irritation.  EXTREMITIES: no cyanosis or edema  NEURO: Oriented times three, II-XII cranial nerves are intact,motor and sensory are grossly intact. Steady gait    ASSESSMENT AND PLAN:   Krysten Mcguire is a 59 year old female who presents for a complete physical exam.  Encounter Diagnoses   Name Primary?    Wellness examination Yes    Essential hypertension     Lymphedema of both lower extremities     Mild intermittent asthma  without complication (HCC)     Hyperlipidemia, unspecified hyperlipidemia type     Prediabetes     Sinusitis, unspecified chronicity, unspecified location     Encounter for vitamin deficiency screening     Lipid screening     Screening for deficiency anemia     Thyroid disorder screen     Screening for colon cancer      Recommend wellness woman's exam: Pelvic and breast with gynecologist    Health maintenance labs, recommend yearly: Lipids, CMP, and CBC.  Discussed importance of screening for colon cancer with colonoscopies starting at age 45, or sooner if high risk  Recommended low fat diet, low carb diet and regular aerobic exercise.    The patient indicates understanding of these issues and agrees to the plan.  The patient is asked to return for CPX in 1 yr.    Orders Placed This Encounter   Procedures    Comp Metabolic Panel (14)    CBC With Differential With Platelet    Lipid Panel    TSH W Reflex To Free T4    Vitamin D, 25-Hydroxy    Scan slide   Screening colonoscopy/Dr. Barros requested    Meds & Refills for this Visit:  Requested Prescriptions     Signed Prescriptions Disp Refills    amLODIPine 5 MG Oral Tab 90 tablet 0     Sig: Take 1 tablet (5 mg total) by mouth daily.    fluticasone propionate 50 MCG/ACT Nasal Suspension 15.8 mL 2     Si sprays by Each Nare route daily.   Declines vaccines at this time: Pneumococcal, Tdap, Zoster    Radha Davila, LUIS

## 2024-07-09 VITALS
DIASTOLIC BLOOD PRESSURE: 80 MMHG | SYSTOLIC BLOOD PRESSURE: 138 MMHG | HEIGHT: 66 IN | OXYGEN SATURATION: 95 % | BODY MASS INDEX: 47.09 KG/M2 | HEART RATE: 68 BPM | WEIGHT: 293 LBS | TEMPERATURE: 99 F

## 2024-07-11 ENCOUNTER — OFFICE VISIT (OUTPATIENT)
Dept: INTERNAL MEDICINE CLINIC | Facility: CLINIC | Age: 59
End: 2024-07-11
Payer: COMMERCIAL

## 2024-07-11 VITALS
WEIGHT: 293 LBS | SYSTOLIC BLOOD PRESSURE: 130 MMHG | HEART RATE: 86 BPM | DIASTOLIC BLOOD PRESSURE: 88 MMHG | HEIGHT: 66 IN | BODY MASS INDEX: 47.09 KG/M2 | OXYGEN SATURATION: 100 %

## 2024-07-11 DIAGNOSIS — E78.5 HYPERLIPIDEMIA, UNSPECIFIED HYPERLIPIDEMIA TYPE: ICD-10-CM

## 2024-07-11 DIAGNOSIS — E66.01 MORBID OBESITY (HCC): ICD-10-CM

## 2024-07-11 DIAGNOSIS — I10 ESSENTIAL HYPERTENSION: Primary | ICD-10-CM

## 2024-07-11 PROCEDURE — 3079F DIAST BP 80-89 MM HG: CPT | Performed by: INTERNAL MEDICINE

## 2024-07-11 PROCEDURE — 99417 PROLNG OP E/M EACH 15 MIN: CPT | Performed by: INTERNAL MEDICINE

## 2024-07-11 PROCEDURE — 3075F SYST BP GE 130 - 139MM HG: CPT | Performed by: INTERNAL MEDICINE

## 2024-07-11 PROCEDURE — 3008F BODY MASS INDEX DOCD: CPT | Performed by: INTERNAL MEDICINE

## 2024-07-11 PROCEDURE — 99215 OFFICE O/P EST HI 40 MIN: CPT | Performed by: INTERNAL MEDICINE

## 2024-07-11 RX ORDER — TIRZEPATIDE 5 MG/.5ML
5 INJECTION, SOLUTION SUBCUTANEOUS WEEKLY
Qty: 2 ML | Refills: 0 | Status: SHIPPED | OUTPATIENT
Start: 2024-07-11 | End: 2024-08-02

## 2024-07-11 RX ORDER — ROSUVASTATIN CALCIUM 10 MG/1
10 TABLET, COATED ORAL NIGHTLY
Qty: 30 TABLET | Refills: 1 | Status: SHIPPED | OUTPATIENT
Start: 2024-07-11 | End: 2024-08-10

## 2024-07-11 RX ORDER — TIRZEPATIDE 2.5 MG/.5ML
2.5 INJECTION, SOLUTION SUBCUTANEOUS WEEKLY
Qty: 2 ML | Refills: 0 | Status: SHIPPED | OUTPATIENT
Start: 2024-07-11 | End: 2024-08-02
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
## 2024-07-11 NOTE — PATIENT INSTRUCTIONS
SOURAV Reza to [facility]. We are excited that you are committed to improving your health and have invited our practice to be part of your journey. Our approach to the medical management of weight loss is similar to that of other chronic diseases, like asthma or high blood pressure. Treatment is tailored to your needs and may look different than someone else in our program. Weight-loss success is dependent on many factors, including your motivation and commitment to better health.      We are committed to your medical safety, particularly when prescribing weight-loss medications. Because we are physicians, we measure your success not only by “pounds lost” - we will also track improvements in your laboratory work, vital signs and quality of life.      Weight loss and maintenance can be a challenging endeavor. We want to celebrate your successes and support you if you encounter difficult times. Please don’t hesitate to ask us questions and share with us any struggles you may have. For some patients, there may be many attempts at weight loss before lasting success is found, but we can help you find your success!      Sincerely,     Surinder Smyth MD  American Board of Obesity Medicine Diplomate  American Board of Internal Medicine Diplomate                                                                  Weight Loss Tips    Cut down on sugar and starches.    Ok to eat healthy fat ( avocado, nuts,eggs, olive oil and coconut oil)    Eat protein with each meal target 15-30 G of protein with each meal: Protein reduces appetite, cravings and hunger. It increases muscle mass and subsequently metabolism and fat burning.     If not able to meet your protein need, you can get a protein shake. Choose one with around 25 g of protein and low carb less than 5 g ( e.g: premier protein, orgain Clean Protein, whey protein muscle milk)    Limit carbohydrates between 50g-100g / day    Limit processed food, eat  unprocessed food whenever you can.    Read nutrition labels    Drink a lot of water  at least 65 oz of water per day: Water is a natural appetite suppressant, increases calorie burning and help you to loose weight.    Eat slowly.    Do not drink your calories ( avoid soft drinks, juice, high calorie coffee drinks, limit alcohol) Also stay away from artificially sweetened beverages too it can cause weight gain.    Think about challenges and write it down to address it next visit.     Do not eat late at night.      Exercise goal for weight loss is 150 minutes per week.    Take a probiotic everyday ( e.g: gooden colon health brand, culturelle, VSL3, Lactobacillus Gasseri )     Use smart phone applications to track your progress/ carb intake e.g:( my fitnesspal, loose it, Carb Manager )    Have a good night sleep aim for 7-8 hours    Reduce your stress level.    Helpful websites: www.dietEasyProve.One World Virtual( search visual low-carb guides dietdoctor), or www.[employer].One World Virtual.    Helpful exercise apps( [employer])   Helpful fasting apps :( Zero)         Foods to avoid :  Sugar: sweets, ice cream, fruit juices, candy and food that is high in added sugar.  Highly processed food  Refined grains: Rice, wheat, bread, cereal and pasta.  Starchy vegetables: Potatoes and corn     Low Carb food :  Meat: lamb, steak, chicken, turkey  Fish and sea food   Eggs  Plain yogurt   Cheese   Fat and oils   Fruits: berries are the best           How I Plan to Lose Weight      Goal setting is the “how” of weight loss. Motivators are the “why.” When setting goals, utilize the SMART technique:    SMART Technique Example   Specific Who, what, where, when, how… “I want to lose 10 pounds in two months.”   Measureable How will you track? 10 pounds in 8 weeks = 1.25 pounds/week   Attainable Resources you have available, previous experience “I have been able to do this before, and now I have new tools from my doctor!”   Relevant Why this goal is important  Review your motivators above   Timely Set benchmarks and deadlines “Focusing for two month intervals works for me.”         Even if your lose 0.5 pound per week You will still lose 26                       pounds by this time next year!

## 2024-07-12 ENCOUNTER — TELEPHONE (OUTPATIENT)
Dept: INTERNAL MEDICINE CLINIC | Facility: CLINIC | Age: 59
End: 2024-07-12

## 2024-07-12 ENCOUNTER — TELEPHONE (OUTPATIENT)
Facility: CLINIC | Age: 59
End: 2024-07-12

## 2024-07-12 DIAGNOSIS — Z86.010 HISTORY OF ADENOMATOUS POLYP OF COLON: ICD-10-CM

## 2024-07-12 DIAGNOSIS — Z12.11 SPECIAL SCREENING FOR MALIGNANT NEOPLASMS, COLON: Primary | ICD-10-CM

## 2024-07-12 NOTE — TELEPHONE ENCOUNTER
Called and spoke to patient.     Last Procedure, Date, MD:  Colonoscopy   Last Diagnosis:  history of adenomatous colon polyps  Recalled (mth/yrs): 5 years   Sedation Used Previously:  MAC  Last Prep Used (if known):  Trilyte  Quality Of Prep (if known): good  Anticoagulants: No  Diuretics: HCTZ  Diabetic Med's (PO/Injectables):   Weight loss Med's: Zepbound weekly  Iron/Herbal/Multivitamin Supplements (RX/OTC): Iron, b6, vitamin D  Marijuana/Vaping/CBD:No  Height & Weight: 5'6\"/324lb 12.8oz  BMI:52.45  Hx of Cardiac/CVA Issues/(MI/Stroke): No  Devices Pacemaker/Defibrillator/Stents: No  Respiratory Issues/Oxygen Use/LACEY/COPD:Asthma  Issues w/ Anesthesia:No    Symptoms (Y/N):Y    Symptoms Details: intermittent constipation and bloating, but has discussed wit provider in the past. Takes miralax daily    Special Comments/Notes:    PCP:Dr. Smyth Last seen   Verified allergies, medications, and Pharmacy      Please advise on orders and prep.     Thank you!       Operative Report signed by Ephraim Barros MD at 2019  9:33 AM  Version 1 of 1  Author: Ephraim Barros MD Service: Gastroenterology Author Type: Physician   Filed: 2019  9:33 AM Date of Service: 2019  9:30 AM Status: Signed   : Ephraim Barros MD (Physician)   Colonoscopy Report           Krysten Mcguire      1965 Age 54 year old   PCP Surinder Smyth MD Endoscopist Ephraim Barros MD      Date of procedure: 19     Procedure: Colonoscopy w/ biopsy     Pre-operative diagnosis: history of adenomatous colon polyps     Post-operative diagnosis: colon and rectal polyps, diverticulosis, hemorrhoids     Sedation: monitored anesthesia care (MAC)     Consent: We discussed the risks/benefits and alternatives to this procedure, as well as the planned sedation. Informed consent was obtained from the patient after the risks of the procedure were discussed, including but not limited to bleeding, perforation, aspiration,  infection, or possibility of a missed lesion as well as the risks of anesthesia including but not limited to cardiopulmonary complications. The patient signed informed consent and elected to proceed with Colonoscopy with intervention [i.e. Biopsy, control of bleeding, dilatation, polypectomy, endoscopic mucosal resection, etc.] as indicated.     Colonoscopy procedure: Once an adequate level of sedation was obtained a digital rectal exam was completed revealing normal tone and no masses palpated. Then the lubricated tip of the [id number]-[id number] diagnostic video colonoscope was carefully inserted and advanced without difficulty to the cecum using the air insufflation technique (only Co2 was used for insufflation). The cecum was identified by localizing the trifold, the appendix and the ileocecal valve. Withdrawal was begun with thorough washing and careful examination of the colonic walls and folds. Retroflexion was performed in the ascending colon and viewed twice in the forward and retroflexed views. Photodocumentation was obtained. The bowel prep was good. Views of the colon were good with washing. Withdrawal time was 20 minutes.     Air was then withdrawn and the endoscope was removed. The patient tolerated the procedure well. There were no immediate postoperative complications. The patient’s vital signs were monitored throughout the procedure and remained stable.     Estimated blood loss: insignificant     Specimens collected:  Colon and rectal polyps     Complications: none      Colonoscopy findings:     Cecum: normal mucosa and vascular pattern     Ascending colon: there were a few scattered diverticula. Otherwise, normal mucosa and vascular pattern     Transverse colon: there were two polyps measuring 3 mm and 3-4 mm removed by cold biopsy forceps. Otherwise, normal mucosa and vascular pattern     Descending colon: normal mucosa and vascular pattern     Sigmoid colon: normal mucosa and vascular pattern      Rectum: retroflexed view showed small non-bleeding hemorrhoids. There were 4 small (2-3 mm) distal rectal polyps removed by cold biopsy forceps. Otherwise, normal mucosa and vascular pattern.     Impression:  1. 6 small colon and rectal polyps removed  2. Mild ascending colon diverticulosis  3. Small non-bleeding hemorrhoids  4. Otherwise, unremarkable colonoscopy     Recommend:  1. Await pathology.   2. Repeat colonoscopy in 10 years if ALL descending and/or sigmoid colon polyps are hyperplastic, however if any single polyp (<10mm) anywhere in the colon is a tubular adenoma or a hyperplastic polyp in the ascending colon or cecum, then repeat colonoscopy in 5 years. If you have 3-10 tubular adenomas OR a single adenoma >10mm, repeat colonoscopy in 3 years. If new signs or symptoms develop, procedure may need to be repeated sooner.   3. Continue your current medications  4. Follow up in GI clinic regarding your elevated liver enzymes  5. Follow up with your primary care physician on a routine basis        Final Diagnosis:      A. Transverse colon polyp x2:  Fragments of tubular adenoma.     B. Rectum polyp x2:  Fragments of hyperplastic polyps.

## 2024-07-15 RX ORDER — SODIUM, POTASSIUM,MAG SULFATES 17.5-3.13G
SOLUTION, RECONSTITUTED, ORAL ORAL
Qty: 354 ML | Refills: 0 | Status: SHIPPED | OUTPATIENT
Start: 2024-07-15

## 2024-07-15 RX ORDER — ONDANSETRON 4 MG/1
4 TABLET, FILM COATED ORAL EVERY 8 HOURS PRN
Qty: 30 TABLET | Refills: 0 | Status: SHIPPED | OUTPATIENT
Start: 2024-07-15

## 2024-07-15 NOTE — TELEPHONE ENCOUNTER
Scheduled for:  Colonoscopy 24779  Provider Name:  Dr. Barros   Date:  10/7/2024  Location:  [facility]  Sedation:  MAC  Time:  10:30am, pt is aware emh will call with arrival time  Prep:  Suprep  Meds/Allergies Reconciled?:  Physician reviewed     Diagnosis with codes:  colorectal cancer screening Z12.11 and history of adenomatous colon polyps  Z86.010  Was patient informed to call insurance with codes (Y/N):  Yes, I confirmed [facility] insurance with this patient.      Referral sent?:  Referral was sent at the time of electronic surgical scheduling.   EM or [facility] notified?:  I sent an electronic request to Endo Scheduling and received a confirmation today.      Medication Orders:  Hold Zepbound for 1 week prior to procedure  Misc Orders:  n/a     Further instructions given by staff:   I discussed the prep instructions with the patient which she verbally understood and is aware that I will mail the instructions today.

## 2024-07-15 NOTE — TELEPHONE ENCOUNTER
Ok to schedule colonoscopy with MAC with split suprep for colorectal cancer screening and history of adenomatous colon polyps at the hospital on a Friday (or a Monday in October or later)    Hold zepbound 1 week prior    Thanks    Ephraim Barros MD  [facility] - Gastroenterology

## 2024-07-18 ENCOUNTER — TELEPHONE (OUTPATIENT)
Dept: INTERNAL MEDICINE CLINIC | Facility: CLINIC | Age: 59
End: 2024-07-18

## 2024-07-18 NOTE — TELEPHONE ENCOUNTER
Pt called and was asking if her 8/15 weight management follow up could be switched to a virtual visit instead of in office.

## 2024-07-22 DIAGNOSIS — I10 ESSENTIAL HYPERTENSION: ICD-10-CM

## 2024-07-22 DIAGNOSIS — E78.5 HYPERLIPIDEMIA, UNSPECIFIED HYPERLIPIDEMIA TYPE: ICD-10-CM

## 2024-07-22 DIAGNOSIS — E66.01 MORBID OBESITY (HCC): ICD-10-CM

## 2024-07-22 RX ORDER — TIRZEPATIDE 5 MG/.5ML
INJECTION, SOLUTION SUBCUTANEOUS
Qty: 2 ML | Refills: 0 | OUTPATIENT
Start: 2024-07-22

## 2024-07-23 ENCOUNTER — MED REC SCAN ONLY (OUTPATIENT)
Dept: INTERNAL MEDICINE CLINIC | Facility: CLINIC | Age: 59
End: 2024-07-23

## 2024-08-15 ENCOUNTER — TELEMEDICINE (OUTPATIENT)
Dept: INTERNAL MEDICINE CLINIC | Facility: CLINIC | Age: 59
End: 2024-08-15
Payer: COMMERCIAL

## 2024-08-15 DIAGNOSIS — E66.01 MORBID OBESITY (HCC): ICD-10-CM

## 2024-08-15 DIAGNOSIS — Z51.81 THERAPEUTIC DRUG MONITORING: Primary | ICD-10-CM

## 2024-08-15 DIAGNOSIS — K21.9 GASTROESOPHAGEAL REFLUX DISEASE, UNSPECIFIED WHETHER ESOPHAGITIS PRESENT: ICD-10-CM

## 2024-08-15 DIAGNOSIS — I10 ESSENTIAL HYPERTENSION: ICD-10-CM

## 2024-08-15 DIAGNOSIS — K59.00 CONSTIPATION, UNSPECIFIED CONSTIPATION TYPE: ICD-10-CM

## 2024-08-15 PROCEDURE — 99214 OFFICE O/P EST MOD 30 MIN: CPT | Performed by: INTERNAL MEDICINE

## 2024-08-15 RX ORDER — TIRZEPATIDE 5 MG/.5ML
5 INJECTION, SOLUTION SUBCUTANEOUS WEEKLY
Qty: 6 ML | Refills: 0 | Status: SHIPPED | OUTPATIENT
Start: 2024-08-15

## 2024-08-15 RX ORDER — OMEPRAZOLE 20 MG/1
20 CAPSULE, DELAYED RELEASE ORAL
Qty: 90 CAPSULE | Refills: 0 | Status: SHIPPED | OUTPATIENT
Start: 2024-08-15 | End: 2024-11-13
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
## 2024-09-04 ENCOUNTER — TELEPHONE (OUTPATIENT)
Dept: INTERNAL MEDICINE CLINIC | Facility: CLINIC | Age: 59
End: 2024-09-04

## 2024-09-04 ENCOUNTER — PATIENT MESSAGE (OUTPATIENT)
Dept: INTERNAL MEDICINE CLINIC | Facility: CLINIC | Age: 59
End: 2024-09-04

## 2024-09-04 RX ORDER — FLUTICASONE FUROATE AND VILANTEROL 200; 25 UG/1; UG/1
1 POWDER RESPIRATORY (INHALATION) DAILY
Qty: 3 EACH | Refills: 1 | Status: SHIPPED | OUTPATIENT
Start: 2024-09-04

## 2024-09-04 RX ORDER — FLUTICASONE FUROATE AND VILANTEROL 100; 25 UG/1; UG/1
POWDER RESPIRATORY (INHALATION)
Qty: 60 EACH | Refills: 2 | OUTPATIENT
Start: 2024-09-04

## 2024-09-04 RX ORDER — FLUTICASONE FUROATE AND VILANTEROL 200; 25 UG/1; UG/1
1 POWDER RESPIRATORY (INHALATION) DAILY
Qty: 3 EACH | Refills: 1 | Status: CANCELLED | OUTPATIENT
Start: 2024-09-04

## 2024-09-04 NOTE — TELEPHONE ENCOUNTER
Last office visit 7/14/2023    Sent patient MyChart message, a follow up is needed for further refills.

## 2024-09-04 NOTE — TELEPHONE ENCOUNTER
Patient called asking if Dr. Smyth could please refill, Brio for her.  She said normally Dr. Jolley prescribes the medication but they refused to fill the medication, as she hasn't been to see the doctor with in a year.    Until she sees Dr. Jolley she is asking if Dr. Smyth can please prescribe the medication, as she has seen her.  Patient said she will scheduled an appointment with Dr. Jolley.

## 2024-09-10 ENCOUNTER — TELEPHONE (OUTPATIENT)
Facility: CLINIC | Age: 59
End: 2024-09-10

## 2024-09-10 DIAGNOSIS — Z12.11 SPECIAL SCREENING FOR MALIGNANT NEOPLASMS, COLON: Primary | ICD-10-CM

## 2024-09-10 DIAGNOSIS — Z86.010 HX OF ADENOMATOUS COLONIC POLYPS: ICD-10-CM

## 2024-09-10 RX ORDER — BUDESONIDE AND FORMOTEROL FUMARATE DIHYDRATE 160; 4.5 UG/1; UG/1
2 AEROSOL RESPIRATORY (INHALATION) 2 TIMES DAILY
Qty: 1 EACH | Refills: 1 | Status: SHIPPED | OUTPATIENT
Start: 2024-09-10

## 2024-09-10 NOTE — TELEPHONE ENCOUNTER
Patient called to ask what time she needs to be at the hospital for her procedure. Patient was made aware that she will receive a call the day before the procedure, however patient states she needs to know now due to her ride needing to plan their day around her procedure. Please call patient.

## 2024-09-20 ENCOUNTER — PATIENT MESSAGE (OUTPATIENT)
Dept: PULMONOLOGY | Facility: CLINIC | Age: 59
End: 2024-09-20

## 2024-09-23 DIAGNOSIS — E78.5 HYPERLIPIDEMIA, UNSPECIFIED HYPERLIPIDEMIA TYPE: ICD-10-CM

## 2024-09-23 DIAGNOSIS — R91.1 LUNG NODULE: ICD-10-CM

## 2024-09-23 DIAGNOSIS — I89.0 LYMPHEDEMA OF BOTH LOWER EXTREMITIES: ICD-10-CM

## 2024-09-23 DIAGNOSIS — J45.20 MILD INTERMITTENT ASTHMA WITHOUT COMPLICATION (HCC): ICD-10-CM

## 2024-09-23 DIAGNOSIS — J32.9 SINUSITIS, UNSPECIFIED CHRONICITY, UNSPECIFIED LOCATION: ICD-10-CM

## 2024-09-23 DIAGNOSIS — Z00.00 ANNUAL PHYSICAL EXAM: ICD-10-CM

## 2024-09-23 DIAGNOSIS — K82.4 GALL BLADDER POLYP: ICD-10-CM

## 2024-09-23 DIAGNOSIS — R73.03 PREDIABETES: ICD-10-CM

## 2024-09-23 NOTE — TELEPHONE ENCOUNTER
From: Krysten Mcguire  To: Lyle Sanchez  Sent: 9/20/2024 11:12 AM CDT  Subject: Refill on Breo    Ede Alvarenga     I was told I needed an appt with you to get my refill on Breo. I can only do virtual visits at this time. Can I set a virtual visit with you? Thanks

## 2024-09-23 NOTE — TELEPHONE ENCOUNTER
RN: Okay to for video visit. Please assist patient with this. If she needs refills on Breo in the meantime, please let me know so I can send. I do not want her to be without.

## 2024-09-23 NOTE — TELEPHONE ENCOUNTER
Telemedicine appointment with Lyle Sanchez PA-C 7/14/23. Patient had Breo Ellipta refilled by PCP on 9/4/24 x 6 months.

## 2024-09-23 NOTE — TELEPHONE ENCOUNTER
Discussed Lyle's response below with Krysten. Per patient she will make an appointment with Lyle Sanchez PA-C in the future after she meets her deductible as she has already had physical exam with PCP. Reassurance provided. Instructed her to contact pulmonary office when she is ready to schedule and we will assist her with video visit. She voiced appreciation.

## 2024-09-24 RX ORDER — VALACYCLOVIR HYDROCHLORIDE 1 G/1
1000 TABLET, FILM COATED ORAL DAILY
Qty: 240 TABLET | Refills: 0 | Status: SHIPPED | OUTPATIENT
Start: 2024-09-24

## 2024-09-24 RX ORDER — ALBUTEROL SULFATE 90 UG/1
1 INHALANT RESPIRATORY (INHALATION) EVERY 6 HOURS PRN
Qty: 8.5 G | Refills: 0 | Status: SHIPPED | OUTPATIENT
Start: 2024-09-24

## 2024-09-27 ENCOUNTER — PATIENT MESSAGE (OUTPATIENT)
Dept: INTERNAL MEDICINE CLINIC | Facility: CLINIC | Age: 59
End: 2024-09-27

## 2024-09-27 ENCOUNTER — OFFICE VISIT (OUTPATIENT)
Dept: OTOLARYNGOLOGY | Facility: CLINIC | Age: 59
End: 2024-09-27
Payer: COMMERCIAL

## 2024-09-27 VITALS
BODY MASS INDEX: 45.99 KG/M2 | TEMPERATURE: 98 F | OXYGEN SATURATION: 98 % | WEIGHT: 293 LBS | RESPIRATION RATE: 17 BRPM | DIASTOLIC BLOOD PRESSURE: 92 MMHG | SYSTOLIC BLOOD PRESSURE: 138 MMHG | HEIGHT: 67 IN | HEART RATE: 67 BPM

## 2024-09-27 DIAGNOSIS — R04.0 EPISTAXIS: Primary | ICD-10-CM

## 2024-09-27 PROCEDURE — 3008F BODY MASS INDEX DOCD: CPT | Performed by: SPECIALIST

## 2024-09-27 PROCEDURE — 3075F SYST BP GE 130 - 139MM HG: CPT | Performed by: SPECIALIST

## 2024-09-27 PROCEDURE — 30901 CONTROL OF NOSEBLEED: CPT | Performed by: SPECIALIST

## 2024-09-27 PROCEDURE — 99212 OFFICE O/P EST SF 10 MIN: CPT | Performed by: SPECIALIST

## 2024-09-27 PROCEDURE — 3080F DIAST BP >= 90 MM HG: CPT | Performed by: SPECIALIST

## 2024-09-28 NOTE — PROGRESS NOTES
Krysten Mcguire is a 59 year old female.   Chief Complaint   Patient presents with    Epistaxis     Patient has been having nose bleeds since last week     HPI:   Patient here has been having recurrent epistaxis on the right for the past 1 week    Current Outpatient Medications   Medication Sig Dispense Refill    valACYclovir 1 G Oral Tab Take 1 tablet (1,000 mg total) by mouth daily. 240 tablet 0    albuterol 108 (90 Base) MCG/ACT Inhalation Aero Soln Inhale 1 puff into the lungs every 6 (six) hours as needed for Wheezing. 8.5 g 0    Budesonide-Formoterol Fumarate (SYMBICORT) 160-4.5 MCG/ACT Inhalation Aerosol Inhale 2 puffs into the lungs 2 (two) times daily. 1 each 1    fluticasone furoate-vilanterol (BREO ELLIPTA) 200-25 MCG/ACT Inhalation Aerosol Powder, Breath Activated Inhale 1 puff into the lungs daily. Rinse your mouth with water without swallowing after using BREO to help reduce your chance of getting thrush. 3 each 1    omeprazole 20 MG Oral Capsule Delayed Release Take 1 capsule (20 mg total) by mouth every morning before breakfast. 90 capsule 0    Tirzepatide-Weight Management (ZEPBOUND) 5 MG/0.5ML Subcutaneous Solution Auto-injector Inject 5 mg into the skin once a week. 6 mL 0    ondansetron (ZOFRAN) 4 mg tablet Take 1 tablet (4 mg total) by mouth every 8 (eight) hours as needed for Nausea. 30 tablet 0    Na Sulfate-K Sulfate-Mg Sulf (SUPREP BOWEL PREP KIT) 17.5-3.13-1.6 GM/177ML Oral Solution Take as MD office directs 354 mL 0    fluticasone propionate 50 MCG/ACT Nasal Suspension 2 sprays by Each Nare route daily. 15.8 mL 2    albuterol (2.5 MG/3ML) 0.083% Inhalation Nebu Soln Take 3 mL (2.5 mg total) by nebulization every 6 (six) hours as needed for Wheezing or Shortness of Breath. 100 mL 2    triamterene-hydroCHLOROthiazide 37.5-25 MG Oral Cap Take 1 capsule by mouth daily. 90 capsule 3    betamethasone 0.1 % External Cream Apply 1 Application  topically 2 (two) times daily. 45 g 0     cyclobenzaprine 10 MG Oral Tab Take 1 tablet (10 mg total) by mouth 3 (three) times daily as needed for Muscle spasms. 30 tablet 1      Past Medical History:    Arthritis    Asthma (HCC)    Colon adenomas    Colon polyp    Constipation    Had it since childhood    Disorder of thyroid    Diverticular disease    Esophageal reflux    Essential hypertension    Herpes    High blood pressure    High cholesterol    Hyperlipidemia    Hyperthyroidism    Morbid obesity (HCC)      Social History:  Social History     Socioeconomic History    Marital status: Single   Tobacco Use    Smoking status: Never    Smokeless tobacco: Never   Vaping Use    Vaping status: Never Used   Substance and Sexual Activity    Alcohol use: No    Drug use: No     Social Determinants of Health      Received from [facility], [facility]    Social Connections    Received from [facility], [facility]    Housing Stability        REVIEW OF SYSTEMS:   GENERAL HEALTH: feels well otherwise  GENERAL : denies fever, chills, sweats, weight loss, weight gain  SKIN: denies any unusual skin lesions or rashes  RESPIRATORY: denies shortness of breath with exertion  NEURO: denies headaches    EXAM:   BP (!) 138/92 (BP Location: Right arm, Patient Position: Sitting, Cuff Size: large)   Pulse 67   Temp 98.4 °F (36.9 °C) (Oral)   Resp 17   Ht 5' 7\" (1.702 m)   Wt (!) 315 lb (142.9 kg)   SpO2 98%   BMI 49.34 kg/m²   System Details   Skin Inspection - Normal.   Constitutional Overall appearance - Normal.   Head/Face Facial features - Normal. Eyebrows - Normal. Skull - Normal.   Eyes Conjunctiva - Right: Normal, Left: Normal. Pupil - Right: Normal, Left: Normal.    Ears Inspection - Right: Normal, Left: Normal.   Canal - Right: Normal, Left: Normal.   TM - Right: Normal, Left: Normal.   Nasal External nose - Normal.   Nasal septum -dried clot identified on the right superior nasal septum.   Consent was obtained.  Area was anesthetized with Hussain-Synephrine and lidocaine.  A silver nitrate stick was used to cauterize the area.  Triple antibiotic ointment was placed.  No complications.  Turbinates - Normal.   Oral/Oropharynx Lips - Normal, Tonsils - Normal, Tongue - Normal    Neck Exam Inspection - Normal. Palpation - Normal. Parotid gland - Normal. Thyroid gland - Normal.   Lymph Detail Submental. Submandibular. Anterior cervical. Posterior cervical. Supraclavicular all without enlargement   Psychiatric Orientation - Oriented to time, place, person & situation. Appropriate mood and affect.   Neurological Memory - Normal. Cranial nerves - Cranial nerves II through XII grossly intact.     ASSESSMENT AND PLAN:   1. Epistaxis  Cauterized as above.  No nose blowing for 1 week's time.  Call or follow-up with any additional questions or problems.        The patient indicates understanding of these issues and agrees to the plan.      Anitra Pat MD  9/27/2024  9:47 PM

## 2024-09-28 NOTE — PATIENT INSTRUCTIONS
A dried clot was removed and your right superior nasal septum was cauterized with silver nitrate.  No nose blowing for 1 week's time.  Follow-up with any additional questions or problems.

## 2024-10-01 RX ORDER — TIRZEPATIDE 5 MG/.5ML
5 INJECTION, SOLUTION SUBCUTANEOUS WEEKLY
Qty: 6 ML | Refills: 0 | Status: SHIPPED | OUTPATIENT
Start: 2024-10-01

## 2024-10-01 NOTE — TELEPHONE ENCOUNTER
Denisha Castañeda, [facility] 9/28/2024 7:50 AM CDT    Requesting zepbound 90 day supply  ----- Message -----  From: Krysten Mcguire  Sent: 9/27/2024 6:30 PM CDT  To: Jade Varela Clinical Staff  Subject: Zepbound Refill     Liss Can W Izaiah jessica

## 2024-10-04 NOTE — PAT NURSING NOTE
Patient instructed to hold zepbound 7 days prior to procedure per the Pre-surgical testing policy.      Confirmed 0920 time of arrival at [facility].

## 2024-10-07 ENCOUNTER — ANESTHESIA (OUTPATIENT)
Dept: ENDOSCOPY | Facility: HOSPITAL | Age: 59
End: 2024-10-07
Payer: COMMERCIAL

## 2024-10-07 ENCOUNTER — HOSPITAL ENCOUNTER (OUTPATIENT)
Facility: HOSPITAL | Age: 59
Setting detail: HOSPITAL OUTPATIENT SURGERY
Discharge: HOME | End: 2024-10-07
Attending: INTERNAL MEDICINE | Admitting: INTERNAL MEDICINE
Payer: COMMERCIAL

## 2024-10-07 ENCOUNTER — ANESTHESIA EVENT (OUTPATIENT)
Dept: ENDOSCOPY | Facility: HOSPITAL | Age: 59
End: 2024-10-07
Payer: COMMERCIAL

## 2024-10-07 VITALS
HEART RATE: 60 BPM | OXYGEN SATURATION: 98 % | SYSTOLIC BLOOD PRESSURE: 132 MMHG | WEIGHT: 293 LBS | HEIGHT: 67 IN | RESPIRATION RATE: 19 BRPM | DIASTOLIC BLOOD PRESSURE: 61 MMHG | BODY MASS INDEX: 45.99 KG/M2

## 2024-10-07 DIAGNOSIS — Z12.11 SPECIAL SCREENING FOR MALIGNANT NEOPLASMS, COLON: ICD-10-CM

## 2024-10-07 DIAGNOSIS — Z86.0101 HISTORY OF ADENOMATOUS POLYP OF COLON: ICD-10-CM

## 2024-10-07 PROCEDURE — 0DBN8ZX EXCISION OF SIGMOID COLON, VIA NATURAL OR ARTIFICIAL OPENING ENDOSCOPIC, DIAGNOSTIC: ICD-10-PCS | Performed by: INTERNAL MEDICINE

## 2024-10-07 PROCEDURE — 45380 COLONOSCOPY AND BIOPSY: CPT | Performed by: INTERNAL MEDICINE

## 2024-10-07 RX ORDER — SODIUM CHLORIDE, SODIUM LACTATE, POTASSIUM CHLORIDE, CALCIUM CHLORIDE 600; 310; 30; 20 MG/100ML; MG/100ML; MG/100ML; MG/100ML
INJECTION, SOLUTION INTRAVENOUS CONTINUOUS
Status: DISCONTINUED | OUTPATIENT
Start: 2024-10-07 | End: 2024-10-07

## 2024-10-07 RX ORDER — NALOXONE HYDROCHLORIDE 0.4 MG/ML
0.08 INJECTION, SOLUTION INTRAMUSCULAR; INTRAVENOUS; SUBCUTANEOUS ONCE AS NEEDED
Status: DISCONTINUED | OUTPATIENT
Start: 2024-10-07 | End: 2024-10-07

## 2024-10-07 RX ORDER — LIDOCAINE HYDROCHLORIDE 10 MG/ML
INJECTION, SOLUTION EPIDURAL; INFILTRATION; INTRACAUDAL; PERINEURAL AS NEEDED
Status: DISCONTINUED | OUTPATIENT
Start: 2024-10-07 | End: 2024-10-07 | Stop reason: SURG

## 2024-10-07 RX ADMIN — SODIUM CHLORIDE, SODIUM LACTATE, POTASSIUM CHLORIDE, CALCIUM CHLORIDE: 600; 310; 30; 20 INJECTION, SOLUTION INTRAVENOUS at 10:16:00

## 2024-10-07 RX ADMIN — LIDOCAINE HYDROCHLORIDE 40 MG: 10 INJECTION, SOLUTION EPIDURAL; INFILTRATION; INTRACAUDAL; PERINEURAL at 10:17:00

## 2024-10-07 NOTE — OPERATIVE REPORT
Colonoscopy Report    Krysten Mcguire     1965 Age 59 year old   PCP Surinder Smyth MD Endoscopist Ephraim Barros MD     Date of procedure: 10/07/24    Procedure: Colonoscopy w/biopsy    Pre-operative diagnosis: colorectal cancer screening, history of adenomatous colon polyps     Last underwent colonoscopy in 2019 found to have 6 subcentimeter colon polyps which were tubular adenomatous.  Here today for surveillance.      Post-operative diagnosis: see impression    Sedation: monitored anesthesia care (MAC)    Consent: We discussed the risks/benefits and alternatives to this procedure, as well as the planned sedation. Informed consent was obtained from the patient after the risks of the procedure were discussed, including but not limited to bleeding, perforation, aspiration, infection, or possibility of a missed lesion as well as the risks of anesthesia including but not limited to cardiopulmonary complications. The patient signed informed consent and elected to proceed with Colonoscopy with intervention [i.e. Biopsy, control of bleeding, dilatation, polypectomy, endoscopic mucosal resection, etc.] as indicated.    Colonoscopy procedure: Once an adequate level of sedation was obtained a digital rectal exam was completed revealing normal tone and no masses palpated. Then the lubricated tip of the [id number] diagnostic video colonoscope was carefully inserted and advanced without difficulty to the cecum using the air insufflation technique (only Co2 was used for insufflation). The cecum was identified by localizing the trifold, the appendix and the ileocecal valve. Withdrawal was begun with thorough washing and careful examination of the colonic walls and folds. The ascending colon was viewed twice in the forward view. Photodocumentation was obtained. The bowel prep was adequate with extensive washing and suctioning. Withdrawal time was 16 minutes.    Air was then withdrawn and the endoscope  was removed. The patient tolerated the procedure well. There were no immediate postoperative complications. The patient’s vital signs were monitored throughout the procedure and remained stable.    Estimated blood loss: insignificant    Specimens collected:  colon polyp    Complications: none     Colonoscopy findings:  There were scattered diverticulosis throughout the colon. Otherwise, as below.     Cecum: normal mucosa and vascular pattern    Ascending colon: normal mucosa and vascular pattern    Transverse colon: normal mucosa and vascular pattern    Descending colon: normal mucosa and vascular pattern    Sigmoid colon: there was a 2-3 mm polyp removed by cold biopsy forceps. Otherwise, normal mucosa and vascular pattern    Rectum: retroflexed view showed small non-bleeding hemorrhoids. Otherwise, normal mucosa and vascular pattern.    Impression:  1. A diminutive colon polyp removed  2. Colon diverticulosis  3. Small hemorrhoids  4. Otherwise, unremarkable colonoscopy    Recommend:  1. Await pathology.   2. Repeat colonoscopy interval pending final pathology results. If new signs or symptoms develop, procedure may need to be repeated sooner.   3. Continue your current medications  4. Increase fiber in the diet  5. Follow up with your primary care physician on a routine basis    >>>If biopsies were performed and you have not received your pathology results either by phone or letter within 2 weeks, please call our office at [phone].    MD Khanh [facility]  10/7/2024
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
## 2024-10-07 NOTE — DISCHARGE INSTRUCTIONS
Home Care Instructions for Colonoscopy  with Sedation    Diet:  - Resume your regular diet as tolerated unless otherwise instructed.  - Start with light meals to minimize bloating.  - Do not drink alcohol today.    Medication:  - If you have questions about resuming your normal medications, please contact your Primary Care Physician.    Activities:  - Take it easy today. Do not return to work today.  - Do not drive today.  - Do not operate any machinery today (including kitchen equipment).    Colonoscopy:  - You may notice some rectal \"spotting\" (a little blood on the toilet tissue) for a day or two after the exam. This is normal.  - If you experience any rectal bleeding (not spotting), persistent tenderness or sharp severe abdominal pains, oral temperature over 100 degrees Fahrenheit, light-headedness or dizziness, or any other problems, contact your doctor.  **If unable to reach your doctor, please go to the [facility] Emergency Room**    - Your referring physician will receive a full report of your examination.  - If you do not hear from your doctor's office within two weeks of your biopsy, please call them for your results.    You may be able to see your laboratory results in [employer] between 4 and 7 business days.  In some cases, your physician may not have viewed the results before they are released to [employer].  If you have questions regarding your results contact the physician who ordered the test/exam by phone or via [employer] by choosing \"Ask a Medical Question.\"

## 2024-10-07 NOTE — ANESTHESIA POSTPROCEDURE EVALUATION
Patient: Krysten Mcguire    Procedure Summary       Date: 10/07/24 Room / Location: [facility] ENDOSCOPY 01 / [facility] ENDOSCOPY    Anesthesia Start: 1014 Anesthesia Stop: 1052    Procedure: COLONOSCOPY Diagnosis:       Special screening for malignant neoplasms, colon      History of adenomatous polyp of colon      (colon polyp, hemorrhoids, diverticulosis)    Surgeons: Ephraim Barros MD Anesthesiologist: Soumya Nagy MD    Anesthesia Type: MAC ASA Status: 3            Anesthesia Type: MAC    Vitals Value Taken Time   /74 10/07/24 1050   Temp  10/07/24 1052   Pulse 70 10/07/24 1052   Resp 22 10/07/24 1052   SpO2 94 % 10/07/24 1052   Vitals shown include unfiled device data.    [facility] AN Post Evaluation:   Patient Evaluated in PACU  Patient Participation: complete - patient participated  Level of Consciousness: sleepy but conscious  Pain Score: 0  Pain Management: adequate  Airway Patency:patent  Dental exam unchanged from preop  Yes    Nausea/Vomiting: none  Cardiovascular Status: acceptable  Respiratory Status: acceptable  Postoperative Hydration acceptable      Soumya Nagy MD  10/7/2024 10:52 AM

## 2024-10-07 NOTE — H&P
History & Physical Examination    Patient Name: Krysten Mcguire  MRN: [id number]  [facility]: [id number]  YOB: 1965    Diagnosis: colorectal cancer screening, history of adenomatous colon polyps    Last underwent colonoscopy in 2019 found to have 6 subcentimeter colon polyps which were tubular adenomatous.  Here today for surveillance.  Says she is without any symptoms aside from occasional constipation which she has noted since starting Zepbound and has improved over time.  Takes magnesium citrate which she says helps those symptoms.    Medications Prior to Admission   Medication Sig Dispense Refill Last Dose    valACYclovir 1 G Oral Tab Take 1 tablet (1,000 mg total) by mouth daily. 240 tablet 0  at PRN    albuterol 108 (90 Base) MCG/ACT Inhalation Aero Soln Inhale 1 puff into the lungs every 6 (six) hours as needed for Wheezing. 8.5 g 0 10/7 at G5020ML    fluticasone furoate-vilanterol (BREO ELLIPTA) 200-25 MCG/ACT Inhalation Aerosol Powder, Breath Activated Inhale 1 puff into the lungs daily. Rinse your mouth with water without swallowing after using BREO to help reduce your chance of getting thrush. 3 each 1 10/7 at 0700    Tirzepatide-Weight Management (ZEPBOUND) 5 MG/0.5ML Subcutaneous Solution Auto-injector Inject 5 mg into the skin once a week. 6 mL 0 2024    ondansetron (ZOFRAN) 4 mg tablet Take 1 tablet (4 mg total) by mouth every 8 (eight) hours as needed for Nausea. 30 tablet 0  at PRN    [] rosuvastatin 10 MG Oral Tab Take 1 tablet (10 mg total) by mouth nightly. 30 tablet 1     fluticasone propionate 50 MCG/ACT Nasal Suspension 2 sprays by Each Nare route daily. 15.8 mL 2  at PRN    albuterol (2.5 MG/3ML) 0.083% Inhalation Nebu Soln Take 3 mL (2.5 mg total) by nebulization every 6 (six) hours as needed for Wheezing or Shortness of Breath. 100 mL 2  at PRN    triamterene-hydroCHLOROthiazide 37.5-25 MG Oral Cap Take 1 capsule by mouth daily. 90 capsule 3 10/6 at 0800     cyclobenzaprine 10 MG Oral Tab Take 1 tablet (10 mg total) by mouth 3 (three) times daily as needed for Muscle spasms. 30 tablet 1  at PRN    Tirzepatide-Weight Management (ZEPBOUND) 5 MG/0.5ML Subcutaneous Solution Auto-injector Inject 5 mg into the skin once a week. 6 mL 0     Budesonide-Formoterol Fumarate (SYMBICORT) 160-4.5 MCG/ACT Inhalation Aerosol Inhale 2 puffs into the lungs 2 (two) times daily. (Patient not taking: Reported on 10/4/2024) 1 each 1 Not Taking    omeprazole 20 MG Oral Capsule Delayed Release Take 1 capsule (20 mg total) by mouth every morning before breakfast. 90 capsule 0     Na Sulfate-K Sulfate-Mg Sulf (SUPREP BOWEL PREP KIT) 17.5-3.13-1.6 GM/177ML Oral Solution Take as MD office directs 354 mL 0     [] Tirzepatide-Weight Management (ZEPBOUND) 2.5 MG/0.5ML Subcutaneous Solution Auto-injector Inject 2.5 mg into the skin once a week for 4 doses. 2 mL 0     [] Tirzepatide-Weight Management (ZEPBOUND) 5 MG/0.5ML Subcutaneous Solution Auto-injector Inject 5 mg into the skin once a week for 4 doses. 2 mL 0     betamethasone 0.1 % External Cream Apply 1 Application  topically 2 (two) times daily. 45 g 0      Current Facility-Administered Medications   Medication Dose Route Frequency    lactated ringers infusion   Intravenous Continuous       Allergies:   Allergies   Allergen Reactions    Codeine JITTERY and OTHER (SEE COMMENTS)    Nitroglycerin OTHER (SEE COMMENTS)     mirgraine/headaches  mirgraine/headaches    Lactose OTHER (SEE COMMENTS)     Stomach cramping       Past Medical History:    Arthritis    Asthma (HCC)    Colon adenomas    Colon polyp    Constipation    Had it since childhood    Disorder of thyroid    Diverticular disease    Esophageal reflux    Essential hypertension    Herpes    High blood pressure    High cholesterol    Hyperlipidemia    Hyperthyroidism    Morbid obesity (HCC)     Past Surgical History:   Procedure Laterality Date    Colonoscopy       Colonoscopy N/A 12/2/2019    Procedure: COLONOSCOPY;  Surgeon: Ephraim Barros MD;  Location: [facility] ENDOSCOPY    Endoscopy, bowel pouch, biopsy  10/03/2018    Knee surgery  2012    [facility] localization wire 1 site right (cpt=19281)       Family History   Problem Relation Age of Onset    Colon Polyps Father     Diabetes Father     Thyroid Disorder Father     Heart Disease Mother     Thyroid Disorder Mother     Thyroid Disorder Sister     Thyroid Disorder Brother     Thyroid Disorder Niece     Breast Cancer Niece 33     Social History     Tobacco Use    Smoking status: Never    Smokeless tobacco: Never   Substance Use Topics    Alcohol use: No       SYSTEM Check if Physical Exam is Normal If not normal, please explain:   HEENT [ X]    NECK  [ X]    HEART [ X]    LUNGS [ X]    ABDOMEN [ X]    EXTREMITIES [ X]      General:awake, cooperative, no acute distress  HEENT: EOMI, no scleral icterus, MMM; oral pharnyx is without exudates or lesions  Neck: no lymphadenopathy; thyroid is not enlarged and without nodules  CV: RRR  Resp: non-labored breathing  Abd: soft, non-tender, non-distended  Ext: no lower extremity swelling  Neuro: Alert, Oriented X 3  Skin: no rashes, bruises  Psych: normal affect  I have discussed the risks and benefits and alternatives of the procedure with the patient/family.  She understands and agreed to proceed with plan of care.   Ephraim Barros MD  [facility] - Gastroenterology  10/7/2024  10:08 AM

## 2024-10-08 ENCOUNTER — TELEPHONE (OUTPATIENT)
Facility: CLINIC | Age: 59
End: 2024-10-08

## 2024-10-08 NOTE — TELEPHONE ENCOUNTER
Colonoscopy done on 10/7/24 by Dr. Barros. Colon recall entered for 7 years. Health maintenance updated and patient outreach completed.

## 2024-10-08 NOTE — PROGRESS NOTES
GI staff: please place recall for colonoscopy in 7 years     Thanks    Ephraim Barros MD  [facility] - Gastroenterology  10/8/2024  8:57 AM

## 2024-10-10 ENCOUNTER — PATIENT MESSAGE (OUTPATIENT)
Dept: PULMONOLOGY | Facility: CLINIC | Age: 59
End: 2024-10-10

## 2024-10-10 NOTE — TELEPHONE ENCOUNTER
Lyle PISANO - Please see patient's message.  When would you like to add patient to schedule? Virtual visit ok?

## 2024-10-10 NOTE — TELEPHONE ENCOUNTER
You can add her on before the end of the year. I would recommend she come in person if possible. Video visit is okay if she is unable.

## 2024-10-11 NOTE — TELEPHONE ENCOUNTER
Kreix message sent to patient with Lyle PISANO's message, offered follow up appointment on 11/1/24 at 12:30pm.

## 2024-10-11 NOTE — TELEPHONE ENCOUNTER
Spoke to patient and video visit added. Patient would have to take off several hours of work to make it to afternoon appointment.     Lyle PISANO-OK with plan?

## 2024-10-12 DIAGNOSIS — E78.5 HYPERLIPIDEMIA, UNSPECIFIED HYPERLIPIDEMIA TYPE: Primary | ICD-10-CM

## 2024-10-14 NOTE — TELEPHONE ENCOUNTER
Noted. Patient is scheduled for virtual phone appointment with Lyle Sanchez PA-C on 11/1/24 12:30 pm.

## 2024-10-21 ENCOUNTER — PATIENT MESSAGE (OUTPATIENT)
Dept: INTERNAL MEDICINE CLINIC | Facility: CLINIC | Age: 59
End: 2024-10-21

## 2024-10-21 DIAGNOSIS — E55.9 VITAMIN D DEFICIENCY: Primary | ICD-10-CM

## 2024-10-21 DIAGNOSIS — E66.01 MORBID OBESITY (HCC): ICD-10-CM

## 2024-10-21 DIAGNOSIS — E78.5 HYPERLIPIDEMIA, UNSPECIFIED HYPERLIPIDEMIA TYPE: ICD-10-CM

## 2024-10-21 DIAGNOSIS — I10 ESSENTIAL HYPERTENSION: ICD-10-CM

## 2024-10-23 ENCOUNTER — HOSPITAL ENCOUNTER (OUTPATIENT)
Age: 59
Discharge: HOME | End: 2024-10-23
Payer: COMMERCIAL

## 2024-10-23 ENCOUNTER — LAB ENCOUNTER (OUTPATIENT)
Dept: LAB | Facility: REFERENCE LAB | Age: 59
End: 2024-10-23
Attending: INTERNAL MEDICINE
Payer: COMMERCIAL

## 2024-10-23 VITALS
HEART RATE: 67 BPM | SYSTOLIC BLOOD PRESSURE: 148 MMHG | DIASTOLIC BLOOD PRESSURE: 82 MMHG | TEMPERATURE: 97 F | RESPIRATION RATE: 20 BRPM | OXYGEN SATURATION: 98 %

## 2024-10-23 DIAGNOSIS — E66.01 MORBID OBESITY (HCC): ICD-10-CM

## 2024-10-23 DIAGNOSIS — L30.9 DERMATITIS: Primary | ICD-10-CM

## 2024-10-23 DIAGNOSIS — I10 ESSENTIAL HYPERTENSION: ICD-10-CM

## 2024-10-23 DIAGNOSIS — E55.9 VITAMIN D DEFICIENCY: ICD-10-CM

## 2024-10-23 DIAGNOSIS — E78.5 HYPERLIPIDEMIA, UNSPECIFIED HYPERLIPIDEMIA TYPE: ICD-10-CM

## 2024-10-23 LAB
CHOLEST SERPL-MCNC: 233 MG/DL (ref ?–200)
CRP SERPL-MCNC: 0.8 MG/DL (ref ?–1)
EST. AVERAGE GLUCOSE BLD GHB EST-MCNC: 117 MG/DL (ref 68–126)
FASTING PATIENT LIPID ANSWER: YES
HBA1C MFR BLD: 5.7 % (ref ?–5.7)
HDLC SERPL-MCNC: 62 MG/DL (ref 40–59)
LDLC SERPL CALC-MCNC: 161 MG/DL (ref ?–100)
NONHDLC SERPL-MCNC: 171 MG/DL (ref ?–130)
TRIGL SERPL-MCNC: 58 MG/DL (ref 30–149)
VLDLC SERPL CALC-MCNC: 11 MG/DL (ref 0–30)

## 2024-10-23 PROCEDURE — 86140 C-REACTIVE PROTEIN: CPT | Performed by: INTERNAL MEDICINE

## 2024-10-23 PROCEDURE — 80061 LIPID PANEL: CPT | Performed by: INTERNAL MEDICINE

## 2024-10-23 PROCEDURE — 83036 HEMOGLOBIN GLYCOSYLATED A1C: CPT | Performed by: INTERNAL MEDICINE

## 2024-10-23 PROCEDURE — 99213 OFFICE O/P EST LOW 20 MIN: CPT | Performed by: NURSE PRACTITIONER

## 2024-10-23 RX ORDER — PREDNISONE 20 MG/1
40 TABLET ORAL DAILY
Qty: 10 TABLET | Refills: 0 | Status: SHIPPED | OUTPATIENT
Start: 2024-10-23 | End: 2024-10-28

## 2024-10-23 NOTE — ED INITIAL ASSESSMENT (HPI)
Pt here with a generalized rash to her body that developed 5 days ago , pt denies any new products to skin

## 2024-10-23 NOTE — ED PROVIDER NOTES
Patient Seen in: [facility]      History     Chief Complaint   Patient presents with    Rash Skin Problem     Stated Complaint: rash    Subjective:   HPI  Patient is a 59-year-old female with asthma and eczema.  She presents to the immediate care center with concern for rash that has been persistent for approximately a week.  This started after she was doing some deep cleaning in her house in a jennifer area.  Initially, it started on her chest wall.  She has topical betamethasone for her chronic eczema.  She is applying the cream to the chest wall and the rash entirely resolved.  She now is experiencing similar rash to the medial aspect of her upper arms bilaterally, abdomen, and entire back.  She started to apply the betamethasone to the arms and believes that is helping.  However, the rash is now diffusely distributed and applying the cream is problematic and impractical.  She denies stomach symptoms.  The rash does not itch.          Objective:     Past Medical History:    Arthritis    Asthma (HCC)    Colon adenomas    Colon polyp    Constipation    Had it since childhood    Disorder of thyroid    Diverticular disease    Esophageal reflux    Essential hypertension    Herpes    High blood pressure    High cholesterol    Hyperlipidemia    Hyperthyroidism    Morbid obesity (HCC)              Past Surgical History:   Procedure Laterality Date    Colonoscopy  2014    Colonoscopy N/A 12/2/2019    Procedure: COLONOSCOPY;  Surgeon: Ephraim Barros MD;  Location: [facility] ENDOSCOPY    Colonoscopy N/A 10/7/2024    Procedure: COLONOSCOPY;  Surgeon: Ephraim Barros MD;  Location: [facility] ENDOSCOPY    Endoscopy, bowel pouch, biopsy  10/03/2018    Knee surgery  2012    [facility] localization wire 1 site right (cpt=19281)                  Social History     Socioeconomic History    Marital status: Single   Tobacco Use    Smoking status: Never    Smokeless tobacco: Never   Vaping Use    Vaping status: Never Used    Substance and Sexual Activity    Alcohol use: No    Drug use: No     Social Drivers of Health      Received from [facility], [facility]    Social Connections    Received from [facility], [facility]    Housing Stability              Review of Systems   Constitutional:  Negative for appetite change, chills and fever.   HENT:  Negative for congestion.    Respiratory:  Negative for cough and shortness of breath.    Cardiovascular:  Negative for chest pain.   Gastrointestinal:  Negative for diarrhea, nausea and vomiting.   Skin:  Positive for rash.   Neurological:  Negative for weakness.       Positive for stated complaint: rash  Other systems are as noted in HPI.  Constitutional and vital signs reviewed.      All other systems reviewed and negative except as noted above.    Physical Exam     ED Triage Vitals [10/23/24 1132]   /82   Pulse 67   Resp 20   Temp 97.1 °F (36.2 °C)   Temp src Temporal   SpO2 98 %   O2 Device None (Room air)       Current Vitals:   Vital Signs  BP: 148/82  Pulse: 67  Resp: 20  Temp: 97.1 °F (36.2 °C)  Temp src: Temporal    Oxygen Therapy  SpO2: 98 %  O2 Device: None (Room air)        Physical Exam  Vitals and nursing note reviewed.   Constitutional:       General: She is not in acute distress.     Appearance: She is not ill-appearing.   Skin:     General: Skin is warm and dry.      Findings: Rash (Maculopapular rash narrowly distributed to the medial aspect of her arms bilaterally.  However, this is diffusely distributed throughout her abdomen and back.) present.   Neurological:      Mental Status: She is alert and oriented to person, place, and time.   Psychiatric:         Behavior: Behavior normal.             ED Course   Labs Reviewed - No data to display                MDM      Patient was informed that because the topical steroids have been shown to be effective, but this is now an large distribution  and areas were difficult for her to apply topical creams, we will start her on a burst of oral prednisone.  She was encouraged to contact her primary care provider for follow-up next week to determine effectiveness and potential need for further evaluation and treatment.  She states understanding and agrees with plan.          Medical Decision Making  Differential diagnoses considered today include, but are not exclusive of: Atopic dermatitis, viral exanthem, contact dermatitis, tinea corporis.    Problems Addressed:  Dermatitis: self-limited or minor problem    Risk  Prescription drug management.        Disposition and Plan     Clinical Impression:  1. Dermatitis         Disposition:  Discharge  10/23/2024 12:01 pm    Follow-up:  Surinder Smyth MD  [address] [place] [id number]  [phone]    Schedule an appointment as soon as possible for a visit in 1 week            Medications Prescribed:  Current Discharge Medication List        START taking these medications    Details   predniSONE 20 MG Oral Tab Take 2 tablets (40 mg total) by mouth daily for 5 days.  Qty: 10 tablet, Refills: 0                 Supplementary Documentation:

## 2024-11-01 ENCOUNTER — TELEMEDICINE (OUTPATIENT)
Dept: PULMONOLOGY | Facility: CLINIC | Age: 59
End: 2024-11-01
Payer: COMMERCIAL

## 2024-11-01 DIAGNOSIS — J45.30 MILD PERSISTENT ASTHMA WITHOUT COMPLICATION (HCC): Primary | ICD-10-CM

## 2024-11-01 PROCEDURE — 99213 OFFICE O/P EST LOW 20 MIN: CPT | Performed by: PHYSICIAN ASSISTANT

## 2024-11-01 RX ORDER — ALBUTEROL SULFATE 90 UG/1
1 INHALANT RESPIRATORY (INHALATION) EVERY 6 HOURS PRN
Qty: 8.5 G | Refills: 5 | Status: SHIPPED | OUTPATIENT
Start: 2024-11-01

## 2024-11-01 NOTE — PROGRESS NOTES
Virtual Video Progress Note    SOURAV MCGUIRE verbally consents to to a Video Visit on 11/1/2024. Patient understands and accepts financial responsibility for any deductible, co-insurance and/or co-pays associated with this service.    Please note that the following visit was completed using two-way, real-time interactive audio and/or video communication. There are limitations of this visit as the physical exam was observed through audio/video only. Every conscious effort was taken to allow for sufficient and adequate time. This billing was spent on reviewing labs, medications, radiology tests and decision making. Appropriate medical decision-making and tests are ordered as detailed in the plan of care above.    History of Present Illness:  Sourav Mcguire is a 59 year old female presenting for virtual video visit today for follow up of asthma. She doing very well on Breo 200/25 with albuterol use on average <2x/month. No nocturnal symptoms. Denies shortness of breath, cough, chest tightness, and wheezing. She has not had any exacerbations or required prednisone for asthma in the last year. She is not interested in influenza or pneumonia vaccines.    Social History: Lifelong nonsmoker, no alcohol     Physical Exam: Limited as this is a video visit. Patient is alert and oriented x3, pleasant and cooperative, speaking in full sentences with no labored breathing.    Results:  -CT chest 2/2023: Right lower lobe pleural-based micronodule stable from 6/2022.     -PFTs 8/2022: Normal.  FEV1 2.17 L (78%), FEV1/FVC 79%.  No significant bronchodilator response.  Normal lung volumes.  Diffusion capacity 78%.    Assessment/Plan:  Mild persistent asthma  Stable and well controlled on ICS/LABA (Breo 200/25). Rare albuterol and no exacerbations in the last year.  Plan:  -Continue Breo 200/25  -Albuterol MDI as needed  -Trigger avoidance  -Recommend annual influenza vaccine - declines  -Recommend PCV20 - declines  -Follow up in 1  year    Pulmonary nodule  Stable micronodule on CT chest 2/2023. Nonsmoker and overall low risk for lung cancer. No routine f/u imaging needed according to Fleischner Society guidelines.  Plan:  -Expectant management    Lyle Sanchez PA-C  Pulmonary Medicine  11/1/2024

## 2024-11-06 ENCOUNTER — PATIENT MESSAGE (OUTPATIENT)
Dept: OTOLARYNGOLOGY | Facility: CLINIC | Age: 59
End: 2024-11-06

## 2025-01-16 ENCOUNTER — PATIENT MESSAGE (OUTPATIENT)
Dept: INTERNAL MEDICINE CLINIC | Facility: CLINIC | Age: 60
End: 2025-01-16

## 2025-01-21 RX ORDER — CETIRIZINE HYDROCHLORIDE 10 MG/1
10 TABLET ORAL DAILY
Qty: 90 TABLET | Refills: 0 | Status: SHIPPED | OUTPATIENT
Start: 2025-01-21 | End: 2025-04-21

## 2025-01-21 RX ORDER — HYDROCHLOROTHIAZIDE 12.5 MG/1
1 CAPSULE ORAL EVERY 2 WEEKS
Qty: 10 EACH | Refills: 2 | Status: SHIPPED | OUTPATIENT
Start: 2025-01-21

## 2025-01-21 RX ORDER — KETOROLAC TROMETHAMINE 30 MG/ML
1 INJECTION, SOLUTION INTRAMUSCULAR; INTRAVENOUS ONCE
Qty: 2 EACH | Refills: 2 | Status: SHIPPED | OUTPATIENT
Start: 2025-01-21 | End: 2025-01-21

## 2025-01-27 RX ORDER — FLUTICASONE FUROATE AND VILANTEROL TRIFENATATE 200; 25 UG/1; UG/1
POWDER RESPIRATORY (INHALATION)
Qty: 180 EACH | Refills: 0 | Status: SHIPPED | OUTPATIENT
Start: 2025-01-27

## 2025-01-27 NOTE — TELEPHONE ENCOUNTER
Future Appt. 07/14/2025    Last Visit: 08/15/2024    Medication Requested:  Requested Prescriptions     Pending Prescriptions Disp Refills    BREO ELLIPTA 200-25 MCG/ACT Inhalation Aerosol Powder, Breath Activated [Pharmacy Med Name: BREO ELLIPTA 200-25MCG ORAL INH(30)] 180 each 0     Sig: INHALE 1 PUFF INTO THE LUNGS DAILY. RINSE YOUR MOUTH WITH WATER WITHOUT SWALLOWING AFTER USING TO HELP REDUCE YOUR CHANCE OF GETTING THRUSH      Last refill:              Disp Refills Start End     fluticasone furoate-vilanterol (BREO ELLIPTA) 200-25 MCG/ACT Inhalation Aerosol Powder, Breath Activated 3 each 1 9/4/2024 --    Sig - Route: Inhale 1 puff into the lungs daily. Rinse your mouth with water without swallowing after using BREO to help reduce your chance of getting thrush. - Inhalation        Asthma & COPD Medication Protocol [id number]/26/2025 08:06 AM   Protocol Details ACT Score greater than or equal to 20    ACT recorded in the last 12 months    Appointment in past 6 or next 3 months    Medication is active on med list

## 2025-01-28 RX ORDER — FLUTICASONE FUROATE AND VILANTEROL 200; 25 UG/1; UG/1
POWDER RESPIRATORY (INHALATION)
Qty: 180 EACH | Refills: 0 | OUTPATIENT
Start: 2025-01-28

## 2025-02-19 DIAGNOSIS — E78.5 HYPERLIPIDEMIA, UNSPECIFIED HYPERLIPIDEMIA TYPE: ICD-10-CM

## 2025-02-19 DIAGNOSIS — I10 ESSENTIAL HYPERTENSION: ICD-10-CM

## 2025-02-19 DIAGNOSIS — K82.4 GALL BLADDER POLYP: ICD-10-CM

## 2025-02-19 DIAGNOSIS — R59.0 CERVICAL LYMPHADENOPATHY: ICD-10-CM

## 2025-02-20 DIAGNOSIS — R59.0 CERVICAL LYMPHADENOPATHY: ICD-10-CM

## 2025-02-20 DIAGNOSIS — I10 ESSENTIAL HYPERTENSION: ICD-10-CM

## 2025-02-20 DIAGNOSIS — K82.4 GALL BLADDER POLYP: ICD-10-CM

## 2025-02-20 DIAGNOSIS — E78.5 HYPERLIPIDEMIA, UNSPECIFIED HYPERLIPIDEMIA TYPE: ICD-10-CM

## 2025-02-20 RX ORDER — TRIAMTERENE AND HYDROCHLOROTHIAZIDE 37.5; 25 MG/1; MG/1
1 CAPSULE ORAL DAILY
Qty: 90 CAPSULE | Refills: 3 | Status: SHIPPED | OUTPATIENT
Start: 2025-02-20 | End: 2025-02-21

## 2025-02-21 RX ORDER — TRIAMTERENE AND HYDROCHLOROTHIAZIDE 37.5; 25 MG/1; MG/1
1 CAPSULE ORAL DAILY
Qty: 90 CAPSULE | Refills: 3 | Status: SHIPPED | OUTPATIENT
Start: 2025-02-21

## 2025-03-04 ENCOUNTER — PATIENT MESSAGE (OUTPATIENT)
Dept: INTERNAL MEDICINE CLINIC | Facility: CLINIC | Age: 60
End: 2025-03-04

## 2025-03-04 DIAGNOSIS — J45.20 MILD INTERMITTENT ASTHMA WITHOUT COMPLICATION (HCC): ICD-10-CM

## 2025-03-04 DIAGNOSIS — J32.9 SINUSITIS, UNSPECIFIED CHRONICITY, UNSPECIFIED LOCATION: ICD-10-CM

## 2025-03-04 RX ORDER — FLUTICASONE PROPIONATE 50 MCG
2 SPRAY, SUSPENSION (ML) NASAL DAILY
Qty: 15.8 ML | Refills: 2 | Status: SHIPPED | OUTPATIENT
Start: 2025-03-04

## 2025-03-04 NOTE — TELEPHONE ENCOUNTER
Future Appt. 2025    Last Visit: 08/15/2024    Medication Requested:  Requested Prescriptions     Pending Prescriptions Disp Refills    fluticasone propionate 50 MCG/ACT Nasal Suspension 15.8 mL 2     Si sprays by Each Nare route daily.      Last refill:        Disp Refills Start End     fluticasone propionate 50 MCG/ACT Nasal Suspension 15.8 mL 2 2024 --    Sig - Route: 2 sprays by Each Nare route daily. - Each Nare      Allergy Medication Protocol [id number] 02:40 PM   Protocol Details In person appointment or virtual visit in the past 12 mos or appointment in next 3 mos    Medication is active on med list

## 2025-05-20 ENCOUNTER — TELEPHONE (OUTPATIENT)
Dept: INTERNAL MEDICINE | Age: 60
End: 2025-05-20

## 2025-06-02 ENCOUNTER — PATIENT MESSAGE (OUTPATIENT)
Dept: PULMONOLOGY | Facility: CLINIC | Age: 60
End: 2025-06-02

## 2025-06-03 ENCOUNTER — TELEPHONE (OUTPATIENT)
Dept: INTERNAL MEDICINE | Age: 60
End: 2025-06-03

## 2025-06-04 RX ORDER — FLUTICASONE FUROATE AND VILANTEROL 200; 25 UG/1; UG/1
1 POWDER RESPIRATORY (INHALATION) DAILY
Qty: 3 EACH | Refills: 1 | Status: SHIPPED | OUTPATIENT
Start: 2025-06-04

## 2025-06-04 NOTE — TELEPHONE ENCOUNTER
Last office visit: 11/1/2024 via telemedicine   Upcoming appointment: No upcoming appointment scheduled  Last refill: 1/27/25 by Dr. Haja Alvarenga PA-C: Please review/sign pended refill request if agreeable.

## 2025-06-05 RX ORDER — FLUTICASONE FUROATE AND VILANTEROL TRIFENATATE 200; 25 UG/1; UG/1
1 POWDER RESPIRATORY (INHALATION) DAILY
Qty: 180 EACH | Refills: 0 | OUTPATIENT
Start: 2025-06-05

## 2025-08-06 DIAGNOSIS — J32.9 SINUSITIS, UNSPECIFIED CHRONICITY, UNSPECIFIED LOCATION: ICD-10-CM

## 2025-08-06 DIAGNOSIS — I89.0 LYMPHEDEMA OF BOTH LOWER EXTREMITIES: ICD-10-CM

## 2025-08-06 DIAGNOSIS — K82.4 GALL BLADDER POLYP: ICD-10-CM

## 2025-08-06 DIAGNOSIS — E78.5 HYPERLIPIDEMIA, UNSPECIFIED HYPERLIPIDEMIA TYPE: ICD-10-CM

## 2025-08-06 DIAGNOSIS — J45.20 MILD INTERMITTENT ASTHMA WITHOUT COMPLICATION (HCC): ICD-10-CM

## 2025-08-06 DIAGNOSIS — R91.1 LUNG NODULE: ICD-10-CM

## 2025-08-06 DIAGNOSIS — R73.03 PREDIABETES: ICD-10-CM

## 2025-08-06 DIAGNOSIS — Z00.00 ANNUAL PHYSICAL EXAM: ICD-10-CM

## 2025-08-08 RX ORDER — CYCLOBENZAPRINE HCL 10 MG
10 TABLET ORAL 3 TIMES DAILY PRN
Qty: 90 TABLET | Refills: 2 | Status: SHIPPED | OUTPATIENT
Start: 2025-08-08

## (undated) DIAGNOSIS — I10 PRIMARY HYPERTENSION: ICD-10-CM

## (undated) DIAGNOSIS — R06.02 SOB (SHORTNESS OF BREATH): Primary | ICD-10-CM

## (undated) DIAGNOSIS — R53.83 OTHER FATIGUE: Primary | ICD-10-CM

## (undated) DIAGNOSIS — R73.03 PREDIABETES: ICD-10-CM

## (undated) DEVICE — FORCEP RADIAL JAW 4

## (undated) DEVICE — MEDI-VAC NON-CONDUCTIVE SUCTION TUBING 6MM X 1.8M (6FT.) L: Brand: CARDINAL HEALTH

## (undated) DEVICE — LINE MNTR ADLT SET O2 INTMD

## (undated) DEVICE — Device: Brand: DEFENDO AIR/WATER/SUCTION AND BIOPSY VALVE

## (undated) DEVICE — Device: Brand: CUSTOM PROCEDURE KIT

## (undated) DEVICE — V2 SPECIMEN COLLECTION MANIFOLD KIT: Brand: NEPTUNE

## (undated) DEVICE — 35 ML SYRINGE REGULAR TIP: Brand: MONOJECT

## (undated) DEVICE — SYRINGE, LUER SLIP, STERILE, 60ML: Brand: MEDLINE

## (undated) DEVICE — KIT ENDO ORCAPOD 160/180/190

## (undated) DEVICE — KIT CLEAN ENDOKIT 1.1OZ GOWNX2

## (undated) DEVICE — GIJAW SINGLE-USE BIOPSY FORCEPS WITH NEEDLE: Brand: GIJAW

## (undated) DEVICE — CO2 CANNULA,SSOFT,ADLT,7O2,4CO2,FEMALE: Brand: MEDLINE

## (undated) NOTE — LETTER
Yanni Gonzalez, [address] [facility]. [address] [facility] [address], [place]       06/17/20        Patient: Kaila Reynolds   YOB: 1965   Date of Visit: 6/17/2020       Dear  Dr. Lavell Christianson MD,      Thank you for referring Kaila Reynolds to my practice.   Sh

## (undated) NOTE — LETTER
[facility]  Administration of Anesthesia  IKrysten agree to be cared for by a physician anesthesiologist alone and/or with a nurse anesthetist, who is specially trained to monitor me and give me medicine to put me to sleep or keep me comfortable during my procedure    I understand that my anesthesiologist and/or anesthetist is not an employee or agent of [facility] or [employer]. He or she works for [facility]    As the patient asking for anesthesia services, I agree to:  Allow the anesthesiologist (anesthesia doctor) to give me medicine and do additional procedures as necessary. Some examples are: Starting or using an “IV” to give me medicine, fluids or blood during my procedure, and having a breathing tube placed to help me breathe when I’m asleep (intubation). In the event that my heart stops working properly, I understand that my anesthesiologist will make every effort to sustain my life, unless otherwise directed by [facility] Do Not Resuscitate documents.  Tell my anesthesia doctor before my procedure:  If I am pregnant.  The last time that I ate or drank.  iii. All of the medicines I take (including prescriptions, herbal supplements, and pills I can buy without a prescription (including street drugs/illegal medications). Failure to inform my anesthesiologist about these medicines may increase my risk of anesthetic complications.  iv.If I am allergic to anything or have had a reaction to anesthesia before.  I understand how the anesthesia medicine will help me (benefits).  I understand that with any type of anesthesia medicine there are risks:  The most common risks are: nausea, vomiting, sore throat, muscle soreness, damage to my eyes, mouth, or teeth (from breathing tube placement).  Rare risks include: remembering what happened during my procedure, allergic reactions to medications, injury to my airway, heart, lungs, vision, nerves, or  muscles and in extremely rare instances death.  My doctor has explained to me other choices available to me for my care (alternatives).  Pregnant Patients (“epidural”):  I understand that the risks of having an epidural (medicine given into my back to help control pain during labor), include itching, low blood pressure, difficulty urinating, headache or slowing of the baby’s heart. Very rare risks include infection, bleeding, seizure, irregular heart rhythms and nerve injury.  Regional Anesthesia (“spinal”, “epidural”, & “nerve blocks”):  I understand that rare but potential complications include headache, bleeding, infection, seizure, irregular heart rhythms, and nerve injury.    _____________________________________________________________________________  Patient (or Representative) Signature/Relationship to Patient  Date   Time    _____________________________________________________________________________   Name (if used)    Language/Organization   Time    _____________________________________________________________________________  Nurse Anesthetist Signature     Date   Time  _____________________________________________________________________________  Anesthesiologist Signature     Date   Time  I have discussed the procedure and information above with the patient (or patient’s representative) and answered their questions. The patient or their representative has agreed to have anesthesia services.    _____________________________________________________________________________  Witness        Date   Time  I have verified that the signature is that of the patient or patient’s representative, and that it was signed before the procedure  Patient Name: Krysten Mcguire     : 1965                 Printed: 10/2/2024 at 4:45 PM    Medical Record #: [id number]                                            Page 1 of 1  ----------ANESTHESIA CONSENT----------

## (undated) NOTE — MR AVS SNAPSHOT
[address] at [address] [id number]  [phone]               Thank you for choosing us for your health care visit with [place] [address] NURSE.   We are glad to serve you and happy to provide you with this summ information, go to [email or web address] and click on the Sign Up Now link in the [employer] box. Enter your My Dentist Activation Code exactly as it appears below along with your Zip Code and Date of Birth to complete the sign-up process.  If you do

## (undated) NOTE — LETTER
Date: 7/3/2018    Patient Name: Lydia Truong          To Whom it may concern: This letter has been written at the patient's request. The above patient was seen at the [facility] for treatment of a medical condition.     This patient shoul

## (undated) NOTE — LETTER
1/23/2020              [address] [place] [id number]         Dear Renetta Auguste records indicate that the tests ordered for you by King Guru MD  have not been done.   If you have, in fact, already completed

## (undated) NOTE — LETTER
Hospital Discharge Documentation  Please phone to schedule a hospital follow up appointment.     From: [address] [facility]  Phone: [phone]    Patient discharged time/date: 8/1/2018  3:24 PM  Patient discharge disposition:  Home or Self Talita Lal MD Consulting Physician  CARDIOLOGY           Physical Exam[JH.1]   Blood pressure 144/52, pulse 57, temperature 98.4 °F (36.9 °C), temperature source Oral, resp.  rate 17, height 5' 7\" (1.702 m), weight 295 lb 9.6 oz (134.1 kg), SpO2 97 BLACK COHOSH EXTRACT OR      Take by mouth. Refills:  0     DELSYM OR      Take by mouth. Refills:  0     Fluticasone Furoate-Vilanterol 100-25 MCG/INH Aepb  Commonly known as:  BREO ELLIPTA      Inhale 1 puff into the lungs daily.    Quantity:  1 each Related Notes:  Addendum by Enrique Chavarria MD (Physician) filed at 2018 11:45 AM         [facility]  Discharge Summary[JH.1]     Krysten Mcguire[JH.2]  : 1965    Status: Observation  Day #:[JH.1] 0[JH.2]    Attending: MD JORGE L Shelton Neurologic:  nonfocal  Psychiatric:  Normal affect, calm and appropriate  Skin:  No rash, no lesion[JH.3]         Discharge Medications      CHANGE how you take these medications      Instructions Prescription details   Esomeprazole Magnesium 40 MG Cpdr  C 40mg for 3 days, 30mg for 3 days, 20mg for 3 days, 10mg for 3 days   Quantity:  30 tablet  Refills:  0     RED YEAST RICE OR      Take by mouth.    Refills:  0     Triamterene-HCTZ 37.5-25 MG Tabs  Commonly known as:  MAXZIDE-25      TAKE 1 TABLET BY MOUTH

## (undated) NOTE — LETTER
Patient Name: Contreras Mcmahon  YOB: 1965          MRN number:  [id number]  Date:  5/28/2021  Referring Physician:  Emelyn MARINO foot and LYMPHEDEMA EVALUATION:      Referring Physician: Dr. Wing Dance  Diagnosis: <76.61 R achilles Obesity  Rehab Potential:good    ASSESSMENT   Donna Doyle presents to Physical Therapy evaluation with decreased ROM R foot/toes, decreased R ankle and hip strength, and swelling. Patient w/ swelling mostly R foot/toes but also noted in BLEs.  Patient stating sh 4+/5, L 5/5     Knees: 5/5 B    Hips: R grossly 4/5, L grossly 4+/5           Posture: pes planus, morbid obesity    Palpation: sensitivity R 1st toe     Gait: WFL     Bed mobility/transfers: independnet    Edema/Tissue Observations:   - mild swelling B lo patient of lymphedema precautions and risk reduction practices, and importance of skin care.  Discussed and demonstrated bandaging and compression options including various vendors that can be utilized        Charges: Physical Therapy Eval: High Complexity, Therapy: Manual Therapy, Self-Care/Home Management Training, Therapeutic Exercise, Neuromuscular Re-education, Orthotic Management and Training      Patient/Family/Caregiver was advised of these findings, precautions, and treatment options and has agreed t

## (undated) NOTE — LETTER
Hospital Discharge Documentation  Please phone to schedule a hospital follow up appointment.     From: [address] Hospitalist's Office  Phone: [phone]    Patient discharged time/date: 7/25/2020  1:07 PM  Patient discharge disposition:  Home or Self However over the last 24 hours has been noticing increasing shortness of breath and chest tightness associated with a dry cough. She was admitted to the hospital for further management. She is stable for discharge today.      Discharge Physical Exam:   Phys Bebe Carlos MD Consulting Physician  HOSPITALIST          Pending Labs     Order Current Status    BLOOD CULTURE Preliminary result    BLOOD CULTURE Preliminary result          Discharge Plan:   Discharge Condition: Stable    Current Discharge Medica Quantity:  18 tablet  Refills:  0     rivaroxaban 10 MG Tabs  Commonly known as:  XARELTO      Take 1 tablet (10 mg total) by mouth daily with food.    Quantity:  30 tablet  Refills:  0        CHANGE how you take these medications      Instructions Prescrip Follow up: Follow up Labs and imaging:         Time spent:  > 30 minutes    Bill Mouse  7/24/2020[SH.1]    Electronically signed by Chadwick Jewell MD on 7/25/2020 10:37 AM   Attribution Key     SH. 1 - Chadwick Jewell MD on 7/24/2020  2:46 PM   [address] Ar[address]ola to being exposed to somebody who turned out to be COVID-19 positive. However over the last 24 hours has been noticing increasing shortness of breath and chest tightness associated with a dry cough. She was admitted to the hospital for further management. Order Current Status    BLOOD CULTURE Preliminary result    BLOOD CULTURE Preliminary result          Discharge Plan:   Discharge Condition: Stable    Current Discharge Medication List    New Orders    cyclobenzaprine 10 MG Oral Tab  Take 1 tablet (10 mg Take 1 tablet (10 mg total) by mouth daily with food.    Quantity:  30 tablet  Refills:  0        CHANGE how you take these medications      Instructions Prescription details   methimazole 5 MG Tabs  Commonly known as:  TAPAZOLE  What changed:  how much to 7/24/2020[SH.1]    Electronically signed by Naga Ca MD on 7/24/2020  2:51 PM   [facility]. 1 - Naga Ca MD on 7/24/2020  2:46 PM   SH. 2 - Naga Ca MD on 7/24/2020  2:47 PM

## (undated) NOTE — Clinical Note
Thank you for the consult. I saw Ms. Emil Ortega in the endocrine/diabetes clinic today. Please see attached my note. Please feel free to contact me with any questions. Thanks!

## (undated) NOTE — MR AVS SNAPSHOT
[address] at [address] [facility] [id number] [phone]               Thank you for choosing us for your health care visit with Lavinia Reed MD.  We are glad to serve you and happy to provide you with this - Don't skip meals   -Read nutrition labels  -drink a lot of water  -No drinking 30 minutes before or after meals   - portion control   - Eat slowly and take 20 to 30 minutes to complete each meal  - Do not drink your calories ( no regular pop, juice, high diabetes, thyroid disease, Parkinson disease, hemorrhoids, and colon cancer  Complications  Potential complications of constipation can include:  · Hemorrhoids  · Rectal bleeding from hemorrhoids or anal fissures (skin tears)  · Hernias  · Dependency on la · Trouble breathing  · Stiff, rigid abdomen that is severely painful to touch  · Confusion  · Fainting or loss of consciousness  · Rapid heart rate  · Chest pain  When to seek medical advice  Call your healthcare provider right away if any of these occur: Total cholesterol includes LDL and HDL cholesterol, as well as other fats in the bloodstream. If your total cholesterol is high, follow the steps below to help lower your total cholesterol level:  · Eat less unhealthy fat:  · Cut back on saturated fats and might benefit from a cholesterol-lowering medication. Date Last Reviewed: 5/11/2015  © 2490-5734 [facility] [address] [place] [address], [facility]. All rights reserved.  This information is not intended as a substitute for professional m moderate to vigorous physical activity for at least 40 minutes each day. You should do this for at least 3 to 4 days each week. A few examples of moderate to vigorous activity are:  · Walking at a brisk pace. This is about 3 to 4 miles per hour.   · Jogging includes vitamins and herbs.   · Tell your healthcare provider if you have any side effects after starting to take a medicine. Examples of side effects to watch for include muscle aches, weakness, blurred vision, rust-colored urine, yellowing of eyes or ski your medicines may cause. Let your provider know about any side effects you have. You may need to take more than one medicine to reach the cholesterol goals that you and your provider decide on.   Date Last Reviewed: 10/1/2016  © 7402-4594 [employer] may be held responsible for payment in full if proper authorization is not acquired. Please contact the Patient Business Office at [phone] if you have any questions related to insurance coverage. Thank you.          Devika     Sign up for se [employer] active are less likely to develop some chronic diseases than adults who are inactive.      HOW TO GET STARTED: HOW TO STAY MOTIVATED:   Start activities slowly and build up over time Do what you like   Get your heart pumping – brisk walking, biking, swimmin

## (undated) NOTE — LETTER
Patient Name: Contreras Mcmahon  YOB: 1965          MRN number:  [id number]  Date:  5/11/2021  Referring Physician:  Emelyn MARINO foot and LYMPHEDEMA EVALUATION:     Referring Physician: Dr. Wing Dance  Diagnosis: <76.61 R achilles tend Obesity  Rehab Potential:good    ASSESSMENT   Bobby Pacheco presents to Physical Therapy evaluation with decreased ROM R foot/toes, decreased R ankle and hip strength, and swelling. Patient w/ swelling mostly R foot/toes but also noted in BLEs.  Patient stating sh 4+/5, L 5/5     Knees: 5/5 B    Hips: R grossly 4/5, L grossly 4+/5           Posture: pes planus, morbid obesity    Palpation: sensitivity R 1st toe     Gait: WFL     Bed mobility/transfers: independnet    Edema/Tissue Observations:   - mild swelling B lo informed patient of lymphedema precautions and risk reduction practices, and importance of skin care.  Discussed and demonstrated bandaging and compression options including various vendors that can be utilized        Charges: Physical Therapy Eval: High Co Decongestive Therapy: Manual Therapy, Self-Care/Home Management Training, Therapeutic Exercise, Neuromuscular Re-education, Orthotic Management and Training      Patient/Family/Caregiver was advised of these findings, precautions, and treatment options and advantage of by anyone at your home or in your life? No        Have you recently had thoughts of hurting yourself? No    Have you tried to hurt yourself in the past?  No        Past medical history was reviewed with Krysten.  Significant findings include is medically necessary for stretching, strengthening, posture re-education, gait/balance, Complete Decongestive Therapy to reduce swelling and decrease risk of infection, garment prescription , compression device prescription and education/self management. 52. 6   MEASUREMENT H 52.1 54.3   MEASUREMENT I 52 52.8   MEASUREMENT J 64.5 67.8   MEASUREMENT K 70.3 74.3   MEASUREMENT L 75.1 77.8   MEASUREMENT M 78.8 82.3    TOTAL VOLUME  [id number]            Lymphedema Life Impact Scale: Pt did not comp BLE volume by a combined total of 5% to decrease risk of infections  4) Patient to be independent with donning/doffing compression garments, HEP, MLD, and lymphedema risk reduction practices to be able to self manage symptoms  5) Increase strength of R ank

## (undated) NOTE — LETTER
[facility]  [address], [place], [place]    Authorization for Surgical Operation and Procedure                               I hereby authorize Ephraim Barros MD, my physician and his/her assistants (if applicable), which may include medical students, residents, and/or fellows, to perform the following surgical operation/ procedure and administer such anesthesia as may be determined necessary by my physician: Operation/Procedure name (s) COLONOSCOPY on Krysten Mcguire   2.   I recognize that during the surgical operation/procedure, unforeseen conditions may necessitate additional or different procedures than those listed above.  I, therefore, further authorize and request that the above-named surgeon, assistants, or designees perform such procedures as are, in their judgment, necessary and desirable.    3.   My surgeon/physician has discussed prior to my surgery the potential benefits, risks and side effects of this procedure; the likelihood of achieving goals; and potential problems that might occur during recuperation.  They also discussed reasonable alternatives to the procedure, including risks, benefits, and side effects related to the alternatives and risks related to not receiving this procedure.  I have had all my questions answered and I acknowledge that no guarantee has been made as to the result that may be obtained.    4.   Should the need arise during my operation/procedure, which includes change of level of care prior to discharge, I also consent to the administration of blood and/or blood products.  Further, I understand that despite careful testing and screening of blood or blood products by collecting agencies, I may still be subject to ill effects as a result of receiving a blood transfusion and/or blood products.  The following are some, but not all, of the potential risks that can occur: fever and allergic reactions, hemolytic reactions, transmission of diseases such as  Hepatitis, AIDS and Cytomegalovirus (CMV) and fluid overload.  In the event that I wish to have an autologous transfusion of my own blood, or a directed donor transfusion, I will discuss this with my physician.  Check only if Refusing Blood or Blood Products  I understand refusal of blood or blood products as deemed necessary by my physician may have serious consequences to my condition to include possible death. I hereby assume responsibility for my refusal and release the hospital, its personnel, and my physicians from any responsibility for the consequences of my refusal.    o  Refuse   5.   I authorize the use of any specimen, organs, tissues, body parts or foreign objects that may be removed from my body during the operation/procedure for diagnosis, research or teaching purposes and their subsequent disposal by hospital authorities.  I also authorize the release of specimen test results and/or written reports to my treating physician on the hospital medical staff or other referring or consulting physicians involved in my care, at the discretion of the Pathologist or my treating physician.    6.   I consent to the photographing or videotaping of the operations or procedures to be performed, including appropriate portions of my body for medical, scientific, or educational purposes, provided my identity is not revealed by the pictures or by descriptive texts accompanying them.  If the procedure has been photographed/videotaped, the surgeon will obtain the original picture, image, videotape or CD.  The hospital will not be responsible for storage, release or maintenance of the picture, image, tape or CD.    7.   I consent to the presence of a  or observers in the operating room as deemed necessary by my physician or their designees.    8.   I recognize that in the event my procedure results in extended X-Ray/fluoroscopy time, I may develop a skin reaction.    9. If I have a Do Not Attempt  Resuscitation (DNAR) order in place, that status will be suspended while in the operating room, procedural suite, and during the recovery period unless otherwise explicitly stated by me (or a person authorized to consent on my behalf). The surgeon or my attending physician will determine when the applicable recovery period ends for purposes of reinstating the DNAR order.  10. Patients having a sterilization procedure: I understand that if the procedure is successful the results will be permanent and it will therefore be impossible for me to inseminate, conceive, or bear children.  I also understand that the procedure is intended to result in sterility, although the result has not been guaranteed.   11. I acknowledge that my physician has explained sedation/analgesia administration to me including the risk and benefits I consent to the administration of sedation/analgesia as may be necessary or desirable in the judgment of my physician.    I CERTIFY THAT I HAVE READ AND FULLY UNDERSTAND THE ABOVE CONSENT TO OPERATION and/or OTHER PROCEDURE.     ____________________________________  _________________________________        ______________________________  Signature of Patient    Signature of Responsible Person                Printed Name of Responsible Person                                      ____________________________________  _____________________________                ________________________________  Signature of Witness        Date  Time         Relationship to Patient    STATEMENT OF PHYSICIAN My signature below affirms that prior to the time of the procedure; I have explained to the patient and/or his/her legal representative, the risks and benefits involved in the proposed treatment and any reasonable alternative to the proposed treatment. I have also explained the risks and benefits involved in refusal of the proposed treatment and alternatives to the proposed treatment and have answered the patient's  questions. If I have a significant financial interest in a co-management agreement or a significant financial interest in any product or implant, or other significant relationship used in this procedure/surgery, I have disclosed this and had a discussion with my patient.     _____________________________________________________              _____________________________  (Signature of Physician)                                                                                         (Date)                                   (Time)  Patient Name: Krysten Mcguire      : 1965      Printed: 10/2/2024     Medical Record #: [id number]                                      Page 1 of 1

## (undated) NOTE — LETTER
12/12/2018          To Whom It May Concern:    Whitney Devine is currently under my medical care and may not return to work at this time. Please excuse Michelle Lunch until 12/27/18.   She may return to work on 12/27/18  If you require additional information ple

## (undated) NOTE — LETTER
Date: 12/18/2019     Dx:  Nerve entrapment (G58.9)           Authorized # of Visits:  6       Referring MD: Abhijeet Mauricio MD visit: none scheduled    Medication Changes since last visit?: No    Subjective: Patient reports that she is does not have pain or s

## (undated) NOTE — LETTER
October 2, 2018         Earl Patel, [address] [facility] [address]      Patient: Angelique Biggs   YOB: 1965   Date of Visit: 10/2/2018       Dear Dr. Manjit Biggs MD,    I saw your patient, Angelique Biggs, on 10/2/2018.  Makenzie

## (undated) NOTE — LETTER
01/15/18        [address], [address] [facility]      Dear Colletta Cunas records indicate that you have outstanding lab work and or testing that was ordered for you and has not yet been completed:          Lipid Panel [E]

## (undated) NOTE — LETTER
ALAN Santos  [address]  [address] [address]  [phone]                12/03/19      [address] [place], [place] [address]      Dear Emma Galvan,    I reviewed the pathology re

## (undated) NOTE — LETTER
Patient Name: Reyna Bailon  YOB: 1965          MRN :  [id number]  Date:  6/2/2021  Referring Physician:  Karla Khalil               Referring Physician: Dr. Kyle Teixeira  Diagnosis: <76.61 R achilles tendonitis, M20.41 R hammer toes, V23.187 • Herpes    • High blood pressure    • High cholesterol    • Hyperlipidemia 1995   • Hyperthyroidism    • Morbid obesity ([facility] [address]          Pain:   6/10, occasional \"zingers\" 2-3/10 R great toe pain (5/25/2021: pt reporting no changes in pain    Subjective: Sensation:  Hypersensitivity R 1st toe      AROM/Strength:                 LE AROM:                             Ankle DF: R 12, L 13                            Dash PF: R 40, L 45                             Ankle IV/EV WFL                              1 MEASUREMENT C 30 31.6   MEASUREMENT D 35.8 36.8   MEASUREMENT E 42.6 42.5   MEASUREMENT F 46.7 47.7   MEASUREMENT G 49.8 52.6   MEASUREMENT H 52.1 54.3   MEASUREMENT I 52 52.8   MEASUREMENT J 64.5 67.8   MEASUREMENT K 70.3 74.3   MEASUREMENT L 75.1 77.8